# Patient Record
Sex: MALE | Race: WHITE | NOT HISPANIC OR LATINO | Employment: OTHER | URBAN - METROPOLITAN AREA
[De-identification: names, ages, dates, MRNs, and addresses within clinical notes are randomized per-mention and may not be internally consistent; named-entity substitution may affect disease eponyms.]

---

## 2019-06-20 ENCOUNTER — HOSPITAL ENCOUNTER (EMERGENCY)
Dept: GENERAL RADIOLOGY | Facility: HOSPITAL | Age: 69
Discharge: HOME | End: 2019-06-20

## 2019-06-20 ENCOUNTER — HOSPITAL ENCOUNTER (INPATIENT)
Facility: HOSPITAL | Age: 69
LOS: 8 days | Discharge: HOME | End: 2019-06-30
Attending: NURSE PRACTITIONER | Admitting: INTERNAL MEDICINE

## 2019-06-20 ENCOUNTER — HOSPITAL ENCOUNTER (OUTPATIENT)
Dept: CARDIOLOGY | Facility: HOSPITAL | Age: 69
Setting detail: OBSERVATION
Discharge: HOME | End: 2019-06-20

## 2019-06-20 ENCOUNTER — APPOINTMENT (OUTPATIENT)
Dept: CT IMAGING | Facility: HOSPITAL | Age: 69
End: 2019-06-20

## 2019-06-20 DIAGNOSIS — R06.00 DYSPNEA, UNSPECIFIED TYPE: ICD-10-CM

## 2019-06-20 DIAGNOSIS — R79.89 ELEVATED D-DIMER: ICD-10-CM

## 2019-06-20 DIAGNOSIS — Z95.1 S/P CABG (CORONARY ARTERY BYPASS GRAFT): Primary | ICD-10-CM

## 2019-06-20 DIAGNOSIS — R06.02 SHORTNESS OF BREATH: ICD-10-CM

## 2019-06-20 DIAGNOSIS — R94.31 ABNORMAL EKG: ICD-10-CM

## 2019-06-20 DIAGNOSIS — R42 DIZZINESS: ICD-10-CM

## 2019-06-20 DIAGNOSIS — I20.0 UNSTABLE ANGINA (HCC): ICD-10-CM

## 2019-06-20 PROBLEM — G89.29 CHRONIC PAIN: Chronic | Status: ACTIVE | Noted: 2019-06-20

## 2019-06-20 PROBLEM — I10 BENIGN ESSENTIAL HTN: Chronic | Status: ACTIVE | Noted: 2019-06-20

## 2019-06-20 PROBLEM — F51.04 CHRONIC INSOMNIA: Chronic | Status: ACTIVE | Noted: 2019-06-20

## 2019-06-20 PROBLEM — I82.502 CHRONIC DEEP VEIN THROMBOSIS (DVT) OF LEFT LOWER EXTREMITY: Chronic | Status: ACTIVE | Noted: 2019-06-20

## 2019-06-20 PROBLEM — N17.9 ACUTE ON CHRONIC RENAL FAILURE: Status: ACTIVE | Noted: 2019-06-20

## 2019-06-20 PROBLEM — N18.9 ACUTE ON CHRONIC RENAL FAILURE: Status: ACTIVE | Noted: 2019-06-20

## 2019-06-20 PROBLEM — M19.90 OSTEOARTHRITIS: Chronic | Status: ACTIVE | Noted: 2019-06-20

## 2019-06-20 LAB
ALBUMIN SERPL-MCNC: 4.2 G/DL (ref 3.5–4.8)
ALBUMIN/GLOB SERPL: 1.2 G/DL (ref 1–1.7)
ALP SERPL-CCNC: 70 U/L (ref 32–91)
ALT SERPL W P-5'-P-CCNC: 34 U/L (ref 17–63)
ANION GAP SERPL CALCULATED.3IONS-SCNC: 10 MMOL/L (ref 10–20)
AST SERPL-CCNC: 29 U/L (ref 15–41)
BASOPHILS # BLD AUTO: 0 10*3/MM3 (ref 0–0.2)
BASOPHILS NFR BLD AUTO: 0.6 % (ref 0–1.5)
BH CV XLRA MEAS LEFT CCA RATIO VEL: -114 CM/SEC
BH CV XLRA MEAS LEFT DIST CCA EDV: -21.7 CM/SEC
BH CV XLRA MEAS LEFT DIST CCA PSV: -114 CM/SEC
BH CV XLRA MEAS LEFT DIST ICA EDV: -27.9 CM/SEC
BH CV XLRA MEAS LEFT DIST ICA PSV: -74.7 CM/SEC
BH CV XLRA MEAS LEFT ICA RATIO VEL: -84.5 CM/SEC
BH CV XLRA MEAS LEFT ICA/CCA RATIO: 0.74
BH CV XLRA MEAS LEFT PROX CCA EDV: 22.4 CM/SEC
BH CV XLRA MEAS LEFT PROX CCA PSV: 111 CM/SEC
BH CV XLRA MEAS LEFT PROX ECA PSV: -91.9 CM/SEC
BH CV XLRA MEAS LEFT PROX ICA EDV: -34.2 CM/SEC
BH CV XLRA MEAS LEFT PROX ICA PSV: -84.5 CM/SEC
BH CV XLRA MEAS LEFT PROX SCLA PSV: 113 CM/SEC
BH CV XLRA MEAS LEFT VERTEBRAL A PSV: 47.7 CM/SEC
BH CV XLRA MEAS RIGHT CCA RATIO VEL: -113 CM/SEC
BH CV XLRA MEAS RIGHT DIST CCA EDV: -30.4 CM/SEC
BH CV XLRA MEAS RIGHT DIST CCA PSV: -113 CM/SEC
BH CV XLRA MEAS RIGHT DIST ICA EDV: -29.2 CM/SEC
BH CV XLRA MEAS RIGHT DIST ICA PSV: -80.1 CM/SEC
BH CV XLRA MEAS RIGHT ICA RATIO VEL: -80.1 CM/SEC
BH CV XLRA MEAS RIGHT ICA/CCA RATIO: 0.71
BH CV XLRA MEAS RIGHT PROX CCA EDV: 13 CM/SEC
BH CV XLRA MEAS RIGHT PROX CCA PSV: 92.6 CM/SEC
BH CV XLRA MEAS RIGHT PROX ECA PSV: -78.2 CM/SEC
BH CV XLRA MEAS RIGHT PROX ICA EDV: -34.2 CM/SEC
BH CV XLRA MEAS RIGHT PROX ICA PSV: -78.3 CM/SEC
BH CV XLRA MEAS RIGHT PROX SCLA PSV: 113 CM/SEC
BH CV XLRA MEAS RIGHT VERTEBRAL A PSV: -45.4 CM/SEC
BILIRUB SERPL-MCNC: 0.5 MG/DL (ref 0.3–1.2)
BUN BLD-MCNC: 22 MG/DL (ref 8–20)
BUN/CREAT SERPL: 15.7 (ref 6.2–20.3)
CALCIUM SPEC-SCNC: 9.8 MG/DL (ref 8.9–10.3)
CHLORIDE SERPL-SCNC: 106 MMOL/L (ref 101–111)
CO2 SERPL-SCNC: 21 MMOL/L (ref 22–32)
CREAT BLD-MCNC: 1.4 MG/DL (ref 0.7–1.2)
D DIMER PPP FEU-MCNC: 1.9 MCGFEU/ML (ref 0.17–0.59)
DEPRECATED RDW RBC AUTO: 44.2 FL (ref 37–54)
EOSINOPHIL # BLD AUTO: 0.1 10*3/MM3 (ref 0–0.4)
EOSINOPHIL NFR BLD AUTO: 2.3 % (ref 0.3–6.2)
ERYTHROCYTE [DISTWIDTH] IN BLOOD BY AUTOMATED COUNT: 13.6 % (ref 12.3–15.4)
GFR SERPL CREATININE-BSD FRML MDRD: 51 ML/MIN/1.73
GLOBULIN UR ELPH-MCNC: 3.6 GM/DL (ref 2.5–3.8)
GLUCOSE BLD-MCNC: 91 MG/DL (ref 65–99)
HCT VFR BLD AUTO: 43 % (ref 37.5–51)
HGB BLD-MCNC: 15 G/DL (ref 13–17.7)
HOLD SPECIMEN: NORMAL
HOLD SPECIMEN: NORMAL
LYMPHOCYTES # BLD AUTO: 1.2 10*3/MM3 (ref 0.7–3.1)
LYMPHOCYTES NFR BLD AUTO: 20.7 % (ref 19.6–45.3)
MCH RBC QN AUTO: 32.5 PG (ref 26.6–33)
MCHC RBC AUTO-ENTMCNC: 34.9 G/DL (ref 31.5–35.7)
MCV RBC AUTO: 93.1 FL (ref 79–97)
MONOCYTES # BLD AUTO: 0.6 10*3/MM3 (ref 0.1–0.9)
MONOCYTES NFR BLD AUTO: 10 % (ref 5–12)
NEUTROPHILS # BLD AUTO: 3.9 10*3/MM3 (ref 1.7–7)
NEUTROPHILS NFR BLD AUTO: 66.4 % (ref 42.7–76)
NRBC BLD AUTO-RTO: 0 /100 WBC (ref 0–0.2)
PLATELET # BLD AUTO: 157 10*3/MM3 (ref 140–450)
PMV BLD AUTO: 9 FL (ref 6–12)
POTASSIUM BLD-SCNC: 3.9 MMOL/L (ref 3.6–5.1)
PROT SERPL-MCNC: 7.8 G/DL (ref 6.1–7.9)
RBC # BLD AUTO: 4.62 10*6/MM3 (ref 4.14–5.8)
SODIUM BLD-SCNC: 137 MMOL/L (ref 136–144)
TROPONIN I SERPL-MCNC: <0.03 NG/ML (ref 0–0.03)
TROPONIN I SERPL-MCNC: <0.03 NG/ML (ref 0–0.03)
WBC NRBC COR # BLD: 5.9 10*3/MM3 (ref 3.4–10.8)
WHOLE BLOOD HOLD SPECIMEN: NORMAL
WHOLE BLOOD HOLD SPECIMEN: NORMAL

## 2019-06-20 PROCEDURE — 0 IOPAMIDOL PER 1 ML: Performed by: NURSE PRACTITIONER

## 2019-06-20 PROCEDURE — 93880 EXTRACRANIAL BILAT STUDY: CPT

## 2019-06-20 PROCEDURE — G0378 HOSPITAL OBSERVATION PER HR: HCPCS

## 2019-06-20 PROCEDURE — A9500 TC99M SESTAMIBI: HCPCS | Performed by: INTERNAL MEDICINE

## 2019-06-20 PROCEDURE — 84484 ASSAY OF TROPONIN QUANT: CPT | Performed by: NURSE PRACTITIONER

## 2019-06-20 PROCEDURE — 93005 ELECTROCARDIOGRAM TRACING: CPT | Performed by: NURSE PRACTITIONER

## 2019-06-20 PROCEDURE — 85025 COMPLETE CBC W/AUTO DIFF WBC: CPT | Performed by: NURSE PRACTITIONER

## 2019-06-20 PROCEDURE — 80053 COMPREHEN METABOLIC PANEL: CPT | Performed by: NURSE PRACTITIONER

## 2019-06-20 PROCEDURE — 71275 CT ANGIOGRAPHY CHEST: CPT

## 2019-06-20 PROCEDURE — 0 TECHNETIUM SESTAMIBI: Performed by: INTERNAL MEDICINE

## 2019-06-20 PROCEDURE — 71045 X-RAY EXAM CHEST 1 VIEW: CPT

## 2019-06-20 PROCEDURE — 85379 FIBRIN DEGRADATION QUANT: CPT | Performed by: NURSE PRACTITIONER

## 2019-06-20 PROCEDURE — 99284 EMERGENCY DEPT VISIT MOD MDM: CPT

## 2019-06-20 PROCEDURE — 99219 PR INITIAL OBSERVATION CARE/DAY 50 MINUTES: CPT | Performed by: INTERNAL MEDICINE

## 2019-06-20 RX ORDER — ASPIRIN 81 MG/1
324 TABLET, CHEWABLE ORAL ONCE
Status: COMPLETED | OUTPATIENT
Start: 2019-06-20 | End: 2019-06-20

## 2019-06-20 RX ORDER — ACETAMINOPHEN 325 MG/1
650 TABLET ORAL EVERY 4 HOURS PRN
Status: DISCONTINUED | OUTPATIENT
Start: 2019-06-20 | End: 2019-06-25

## 2019-06-20 RX ORDER — CHLORAL HYDRATE 500 MG
CAPSULE ORAL
COMMUNITY

## 2019-06-20 RX ORDER — SODIUM CHLORIDE 0.9 % (FLUSH) 0.9 %
3 SYRINGE (ML) INJECTION EVERY 12 HOURS SCHEDULED
Status: DISCONTINUED | OUTPATIENT
Start: 2019-06-20 | End: 2019-06-24 | Stop reason: HOSPADM

## 2019-06-20 RX ORDER — CYCLOBENZAPRINE HCL 10 MG
10 TABLET ORAL 2 TIMES DAILY
Status: DISCONTINUED | OUTPATIENT
Start: 2019-06-20 | End: 2019-06-24 | Stop reason: HOSPADM

## 2019-06-20 RX ORDER — CHOLECALCIFEROL (VITAMIN D3) 125 MCG
5 CAPSULE ORAL NIGHTLY PRN
Status: DISCONTINUED | OUTPATIENT
Start: 2019-06-20 | End: 2019-06-24 | Stop reason: HOSPADM

## 2019-06-20 RX ORDER — TEMAZEPAM 15 MG/1
15 CAPSULE ORAL NIGHTLY PRN
Status: DISCONTINUED | OUTPATIENT
Start: 2019-06-20 | End: 2019-06-24 | Stop reason: HOSPADM

## 2019-06-20 RX ORDER — MELATONIN
1000 DAILY
Status: DISCONTINUED | OUTPATIENT
Start: 2019-06-20 | End: 2019-06-24 | Stop reason: HOSPADM

## 2019-06-20 RX ORDER — ASPIRIN 81 MG/1
81 TABLET, CHEWABLE ORAL DAILY
COMMUNITY
End: 2022-03-16 | Stop reason: SDUPTHER

## 2019-06-20 RX ORDER — ONDANSETRON 2 MG/ML
4 INJECTION INTRAMUSCULAR; INTRAVENOUS EVERY 6 HOURS PRN
Status: DISCONTINUED | OUTPATIENT
Start: 2019-06-20 | End: 2019-06-24 | Stop reason: HOSPADM

## 2019-06-20 RX ORDER — MELATONIN
1000 DAILY
COMMUNITY
End: 2022-03-16 | Stop reason: SDUPTHER

## 2019-06-20 RX ORDER — SODIUM CHLORIDE 0.9 % (FLUSH) 0.9 %
10 SYRINGE (ML) INJECTION AS NEEDED
Status: DISCONTINUED | OUTPATIENT
Start: 2019-06-20 | End: 2019-06-24 | Stop reason: HOSPADM

## 2019-06-20 RX ORDER — SODIUM CHLORIDE 0.9 % (FLUSH) 0.9 %
3-10 SYRINGE (ML) INJECTION AS NEEDED
Status: DISCONTINUED | OUTPATIENT
Start: 2019-06-20 | End: 2019-06-24 | Stop reason: HOSPADM

## 2019-06-20 RX ORDER — ONDANSETRON 4 MG/1
4 TABLET, FILM COATED ORAL EVERY 6 HOURS PRN
Status: DISCONTINUED | OUTPATIENT
Start: 2019-06-20 | End: 2019-06-24 | Stop reason: HOSPADM

## 2019-06-20 RX ORDER — SODIUM CHLORIDE 9 MG/ML
100 INJECTION, SOLUTION INTRAVENOUS CONTINUOUS
Status: DISCONTINUED | OUTPATIENT
Start: 2019-06-20 | End: 2019-06-23

## 2019-06-20 RX ORDER — NITROGLYCERIN 0.4 MG/1
0.4 TABLET SUBLINGUAL
Status: DISCONTINUED | OUTPATIENT
Start: 2019-06-20 | End: 2019-06-24 | Stop reason: HOSPADM

## 2019-06-20 RX ORDER — ATENOLOL 50 MG/1
50 TABLET ORAL DAILY
COMMUNITY
End: 2019-06-30 | Stop reason: HOSPADM

## 2019-06-20 RX ORDER — LISINOPRIL 5 MG/1
5 TABLET ORAL DAILY
COMMUNITY
End: 2019-06-30 | Stop reason: HOSPADM

## 2019-06-20 RX ORDER — ASPIRIN 81 MG/1
81 TABLET, CHEWABLE ORAL DAILY
Status: DISCONTINUED | OUTPATIENT
Start: 2019-06-21 | End: 2019-06-21

## 2019-06-20 RX ORDER — DICLOFENAC SODIUM 75 MG/1
75 TABLET, DELAYED RELEASE ORAL 2 TIMES DAILY
COMMUNITY
End: 2019-06-30 | Stop reason: HOSPADM

## 2019-06-20 RX ORDER — ALUMINA, MAGNESIA, AND SIMETHICONE 2400; 2400; 240 MG/30ML; MG/30ML; MG/30ML
15 SUSPENSION ORAL EVERY 6 HOURS PRN
Status: DISCONTINUED | OUTPATIENT
Start: 2019-06-20 | End: 2019-06-24

## 2019-06-20 RX ORDER — CYCLOBENZAPRINE HCL 10 MG
10 TABLET ORAL 2 TIMES DAILY
COMMUNITY
Start: 2019-06-14 | End: 2019-06-30 | Stop reason: HOSPADM

## 2019-06-20 RX ORDER — ASPIRIN 81 MG/1
81 TABLET, CHEWABLE ORAL DAILY
Status: DISCONTINUED | OUTPATIENT
Start: 2019-06-20 | End: 2019-06-20

## 2019-06-20 RX ADMIN — IOPAMIDOL 100 ML: 755 INJECTION, SOLUTION INTRAVENOUS at 12:09

## 2019-06-20 RX ADMIN — SODIUM CHLORIDE 100 ML/HR: 900 INJECTION, SOLUTION INTRAVENOUS at 17:05

## 2019-06-20 RX ADMIN — ASPIRIN 81 MG 324 MG: 81 TABLET ORAL at 09:23

## 2019-06-20 RX ADMIN — Medication 10 ML: at 09:28

## 2019-06-21 ENCOUNTER — APPOINTMENT (OUTPATIENT)
Dept: NUCLEAR MEDICINE | Facility: HOSPITAL | Age: 69
End: 2019-06-21

## 2019-06-21 PROBLEM — R42 DIZZINESS: Status: ACTIVE | Noted: 2019-06-21

## 2019-06-21 PROBLEM — R42 DIZZINESS: Status: RESOLVED | Noted: 2019-06-20 | Resolved: 2019-06-21

## 2019-06-21 PROBLEM — N17.9 ACUTE ON CHRONIC RENAL FAILURE: Status: RESOLVED | Noted: 2019-06-20 | Resolved: 2019-06-21

## 2019-06-21 PROBLEM — R06.02 SHORTNESS OF BREATH: Status: ACTIVE | Noted: 2019-06-21

## 2019-06-21 PROBLEM — R06.00 DYSPNEA: Status: RESOLVED | Noted: 2019-06-20 | Resolved: 2019-06-21

## 2019-06-21 PROBLEM — R79.89 ELEVATED D-DIMER: Status: RESOLVED | Noted: 2019-06-20 | Resolved: 2019-06-21

## 2019-06-21 PROBLEM — N18.9 ACUTE ON CHRONIC RENAL FAILURE (HCC): Status: RESOLVED | Noted: 2019-06-20 | Resolved: 2019-06-21

## 2019-06-21 LAB
ALBUMIN SERPL-MCNC: 3.6 G/DL (ref 3.5–4.8)
ALBUMIN/GLOB SERPL: 1.1 G/DL (ref 1–1.7)
ALP SERPL-CCNC: 58 U/L (ref 32–91)
ALT SERPL W P-5'-P-CCNC: 30 U/L (ref 17–63)
ANION GAP SERPL CALCULATED.3IONS-SCNC: 6 MMOL/L (ref 10–20)
AST SERPL-CCNC: 22 U/L (ref 15–41)
BASOPHILS # BLD AUTO: 0 10*3/MM3 (ref 0–0.2)
BASOPHILS NFR BLD AUTO: 0.3 % (ref 0–1.5)
BILIRUB SERPL-MCNC: 0.8 MG/DL (ref 0.3–1.2)
BUN BLD-MCNC: 23 MG/DL (ref 8–20)
BUN/CREAT SERPL: 16.4 (ref 6.2–20.3)
CALCIUM SPEC-SCNC: 8.8 MG/DL (ref 8.9–10.3)
CHLORIDE SERPL-SCNC: 109 MMOL/L (ref 101–111)
CO2 SERPL-SCNC: 22 MMOL/L (ref 22–32)
CREAT BLD-MCNC: 1.4 MG/DL (ref 0.7–1.2)
DEPRECATED RDW RBC AUTO: 43.3 FL (ref 37–54)
EOSINOPHIL # BLD AUTO: 0.2 10*3/MM3 (ref 0–0.4)
EOSINOPHIL NFR BLD AUTO: 4.1 % (ref 0.3–6.2)
ERYTHROCYTE [DISTWIDTH] IN BLOOD BY AUTOMATED COUNT: 13.3 % (ref 12.3–15.4)
GFR SERPL CREATININE-BSD FRML MDRD: 51 ML/MIN/1.73
GLOBULIN UR ELPH-MCNC: 3.2 GM/DL (ref 2.5–3.8)
GLUCOSE BLD-MCNC: 99 MG/DL (ref 65–99)
HCT VFR BLD AUTO: 39.9 % (ref 37.5–51)
HGB BLD-MCNC: 13.5 G/DL (ref 13–17.7)
LYMPHOCYTES # BLD AUTO: 1.5 10*3/MM3 (ref 0.7–3.1)
LYMPHOCYTES NFR BLD AUTO: 25 % (ref 19.6–45.3)
MCH RBC QN AUTO: 31.9 PG (ref 26.6–33)
MCHC RBC AUTO-ENTMCNC: 33.9 G/DL (ref 31.5–35.7)
MCV RBC AUTO: 94.3 FL (ref 79–97)
MONOCYTES # BLD AUTO: 0.7 10*3/MM3 (ref 0.1–0.9)
MONOCYTES NFR BLD AUTO: 11.4 % (ref 5–12)
NEUTROPHILS # BLD AUTO: 3.5 10*3/MM3 (ref 1.7–7)
NEUTROPHILS NFR BLD AUTO: 59.2 % (ref 42.7–76)
NRBC BLD AUTO-RTO: 0.1 /100 WBC (ref 0–0.2)
PLATELET # BLD AUTO: 139 10*3/MM3 (ref 140–450)
PMV BLD AUTO: 9.5 FL (ref 6–12)
POTASSIUM BLD-SCNC: 3.8 MMOL/L (ref 3.6–5.1)
PROT SERPL-MCNC: 6.8 G/DL (ref 6.1–7.9)
RBC # BLD AUTO: 4.23 10*6/MM3 (ref 4.14–5.8)
SODIUM BLD-SCNC: 137 MMOL/L (ref 136–144)
TROPONIN I SERPL-MCNC: <0.03 NG/ML (ref 0–0.03)
WBC NRBC COR # BLD: 5.9 10*3/MM3 (ref 3.4–10.8)

## 2019-06-21 PROCEDURE — 80053 COMPREHEN METABOLIC PANEL: CPT | Performed by: INTERNAL MEDICINE

## 2019-06-21 PROCEDURE — 93017 CV STRESS TEST TRACING ONLY: CPT

## 2019-06-21 PROCEDURE — G0378 HOSPITAL OBSERVATION PER HR: HCPCS

## 2019-06-21 PROCEDURE — 0 TECHNETIUM SESTAMIBI: Performed by: INTERNAL MEDICINE

## 2019-06-21 PROCEDURE — 99225 PR SBSQ OBSERVATION CARE/DAY 25 MINUTES: CPT | Performed by: INTERNAL MEDICINE

## 2019-06-21 PROCEDURE — 99244 OFF/OP CNSLTJ NEW/EST MOD 40: CPT | Performed by: INTERNAL MEDICINE

## 2019-06-21 PROCEDURE — 85025 COMPLETE CBC W/AUTO DIFF WBC: CPT | Performed by: INTERNAL MEDICINE

## 2019-06-21 PROCEDURE — 84484 ASSAY OF TROPONIN QUANT: CPT | Performed by: NURSE PRACTITIONER

## 2019-06-21 PROCEDURE — 25010000002 ENOXAPARIN PER 10 MG: Performed by: NURSE PRACTITIONER

## 2019-06-21 PROCEDURE — 25010000002 REGADENOSON 0.4 MG/5ML SOLUTION: Performed by: INTERNAL MEDICINE

## 2019-06-21 PROCEDURE — A9500 TC99M SESTAMIBI: HCPCS | Performed by: INTERNAL MEDICINE

## 2019-06-21 PROCEDURE — 78452 HT MUSCLE IMAGE SPECT MULT: CPT

## 2019-06-21 RX ORDER — ATORVASTATIN CALCIUM 20 MG/1
20 TABLET, FILM COATED ORAL DAILY
Qty: 30 TABLET | Refills: 1 | Status: SHIPPED | OUTPATIENT
Start: 2019-06-21 | End: 2019-06-30 | Stop reason: HOSPADM

## 2019-06-21 RX ORDER — SODIUM CHLORIDE 0.9 % (FLUSH) 0.9 %
3-10 SYRINGE (ML) INJECTION AS NEEDED
Status: DISCONTINUED | OUTPATIENT
Start: 2019-06-21 | End: 2019-06-22 | Stop reason: HOSPADM

## 2019-06-21 RX ORDER — SODIUM CHLORIDE 0.9 % (FLUSH) 0.9 %
3 SYRINGE (ML) INJECTION EVERY 12 HOURS SCHEDULED
Status: DISCONTINUED | OUTPATIENT
Start: 2019-06-21 | End: 2019-06-22 | Stop reason: HOSPADM

## 2019-06-21 RX ORDER — NITROGLYCERIN 0.4 MG/1
0.4 TABLET SUBLINGUAL
Status: DISCONTINUED | OUTPATIENT
Start: 2019-06-21 | End: 2019-06-21

## 2019-06-21 RX ORDER — SODIUM CHLORIDE 9 MG/ML
75 INJECTION, SOLUTION INTRAVENOUS CONTINUOUS
Status: DISCONTINUED | OUTPATIENT
Start: 2019-06-21 | End: 2019-06-23

## 2019-06-21 RX ORDER — ACETYLCYSTEINE 200 MG/ML
1200 SOLUTION ORAL; RESPIRATORY (INHALATION) EVERY 12 HOURS SCHEDULED
Status: CANCELLED | OUTPATIENT
Start: 2019-06-21 | End: 2019-06-22

## 2019-06-21 RX ORDER — ASPIRIN 81 MG/1
324 TABLET, CHEWABLE ORAL ONCE
Status: DISCONTINUED | OUTPATIENT
Start: 2019-06-21 | End: 2019-06-21

## 2019-06-21 RX ORDER — ATORVASTATIN CALCIUM 20 MG/1
20 TABLET, FILM COATED ORAL NIGHTLY
Status: DISCONTINUED | OUTPATIENT
Start: 2019-06-21 | End: 2019-06-22

## 2019-06-21 RX ORDER — ATENOLOL 50 MG/1
50 TABLET ORAL DAILY
Status: DISCONTINUED | OUTPATIENT
Start: 2019-06-21 | End: 2019-06-24 | Stop reason: HOSPADM

## 2019-06-21 RX ORDER — ASPIRIN 81 MG/1
81 TABLET ORAL DAILY
Status: DISCONTINUED | OUTPATIENT
Start: 2019-06-22 | End: 2019-06-24 | Stop reason: HOSPADM

## 2019-06-21 RX ADMIN — TECHNETIUM TC 99M SESTAMIBI 1 DOSE: 1 INJECTION INTRAVENOUS at 09:15

## 2019-06-21 RX ADMIN — SODIUM CHLORIDE 75 ML/HR: 900 INJECTION, SOLUTION INTRAVENOUS at 23:28

## 2019-06-21 RX ADMIN — ATENOLOL 50 MG: 50 TABLET ORAL at 17:11

## 2019-06-21 RX ADMIN — ATORVASTATIN CALCIUM 20 MG: 20 TABLET, FILM COATED ORAL at 20:03

## 2019-06-21 RX ADMIN — ENOXAPARIN SODIUM 40 MG: 40 INJECTION SUBCUTANEOUS at 16:35

## 2019-06-21 RX ADMIN — ACETAMINOPHEN 650 MG: 325 TABLET, FILM COATED ORAL at 16:35

## 2019-06-21 RX ADMIN — REGADENOSON 0.4 MG: 0.08 INJECTION, SOLUTION INTRAVENOUS at 09:15

## 2019-06-21 RX ADMIN — Medication 3 ML: at 08:05

## 2019-06-21 RX ADMIN — SODIUM CHLORIDE 75 ML/HR: 900 INJECTION, SOLUTION INTRAVENOUS at 17:15

## 2019-06-21 RX ADMIN — SODIUM CHLORIDE 100 ML/HR: 900 INJECTION, SOLUTION INTRAVENOUS at 03:11

## 2019-06-21 RX ADMIN — Medication 1 DOSE: at 06:55

## 2019-06-21 RX ADMIN — TEMAZEPAM 15 MG: 15 CAPSULE ORAL at 20:03

## 2019-06-21 RX ADMIN — ASPIRIN 81 MG 81 MG: 81 TABLET ORAL at 08:03

## 2019-06-22 ENCOUNTER — HOSPITAL ENCOUNTER (INPATIENT)
Dept: CARDIOLOGY | Facility: HOSPITAL | Age: 69
Discharge: HOME | End: 2019-06-22

## 2019-06-22 VITALS
WEIGHT: 187 LBS | BODY MASS INDEX: 28.34 KG/M2 | HEIGHT: 68 IN | DIASTOLIC BLOOD PRESSURE: 85 MMHG | SYSTOLIC BLOOD PRESSURE: 149 MMHG

## 2019-06-22 PROBLEM — I20.0 UNSTABLE ANGINA: Status: ACTIVE | Noted: 2019-06-22

## 2019-06-22 PROBLEM — R94.31 ABNORMAL EKG: Status: ACTIVE | Noted: 2019-06-22

## 2019-06-22 PROBLEM — R94.39 ABNORMAL NUCLEAR STRESS TEST: Status: ACTIVE | Noted: 2019-06-22

## 2019-06-22 LAB
ANION GAP SERPL CALCULATED.3IONS-SCNC: 6 MMOL/L (ref 10–20)
ARTICHOKE IGE QN: 110 MG/DL (ref 0–100)
BASOPHILS # BLD AUTO: 0 10*3/MM3 (ref 0–0.2)
BASOPHILS NFR BLD AUTO: 0.4 % (ref 0–1.5)
BH CV ECHO MEAS - % IVS THICK: 52 %
BH CV ECHO MEAS - % LVPW THICK: 34.4 %
BH CV ECHO MEAS - ACS: 1.6 CM
BH CV ECHO MEAS - AO MAX PG (FULL): 0.36 MMHG
BH CV ECHO MEAS - AO MAX PG: 4.7 MMHG
BH CV ECHO MEAS - AO MEAN PG (FULL): -0.03 MMHG
BH CV ECHO MEAS - AO MEAN PG: 2.6 MMHG
BH CV ECHO MEAS - AO ROOT AREA (BSA CORRECTED): 1.8
BH CV ECHO MEAS - AO ROOT AREA: 9 CM^2
BH CV ECHO MEAS - AO ROOT DIAM: 3.4 CM
BH CV ECHO MEAS - AO V2 MAX: 108.9 CM/SEC
BH CV ECHO MEAS - AO V2 MEAN: 77.4 CM/SEC
BH CV ECHO MEAS - AO V2 VTI: 20.1 CM
BH CV ECHO MEAS - AVA(I,A): 3.5 CM^2
BH CV ECHO MEAS - AVA(I,D): 3.5 CM^2
BH CV ECHO MEAS - AVA(V,A): 3.5 CM^2
BH CV ECHO MEAS - AVA(V,D): 3.5 CM^2
BH CV ECHO MEAS - BSA(HAYCOCK): 1.9 M^2
BH CV ECHO MEAS - BSA: 1.9 M^2
BH CV ECHO MEAS - BZI_BMI: 25.1 KILOGRAMS/M^2
BH CV ECHO MEAS - BZI_METRIC_HEIGHT: 175.3 CM
BH CV ECHO MEAS - BZI_METRIC_WEIGHT: 77.1 KG
BH CV ECHO MEAS - EDV(CUBED): 167.9 ML
BH CV ECHO MEAS - EDV(MOD-SP2): 75.6 ML
BH CV ECHO MEAS - EDV(MOD-SP4): 79.2 ML
BH CV ECHO MEAS - EDV(TEICH): 148.5 ML
BH CV ECHO MEAS - EF(CUBED): 58.8 %
BH CV ECHO MEAS - EF(MOD-BP): 50 %
BH CV ECHO MEAS - EF(MOD-SP2): 53.9 %
BH CV ECHO MEAS - EF(MOD-SP4): 45.5 %
BH CV ECHO MEAS - EF(TEICH): 49.9 %
BH CV ECHO MEAS - ESV(CUBED): 69.2 ML
BH CV ECHO MEAS - ESV(MOD-SP2): 34.9 ML
BH CV ECHO MEAS - ESV(MOD-SP4): 43.2 ML
BH CV ECHO MEAS - ESV(TEICH): 74.4 ML
BH CV ECHO MEAS - FS: 25.6 %
BH CV ECHO MEAS - IVS/LVPW: 1.3
BH CV ECHO MEAS - IVSD: 1.3 CM
BH CV ECHO MEAS - IVSS: 1.9 CM
BH CV ECHO MEAS - LA DIMENSION(2D): 4 CM
BH CV ECHO MEAS - LV DIASTOLIC VOL/BSA (35-75): 41.1 ML/M^2
BH CV ECHO MEAS - LV MASS(C)D: 252.9 GRAMS
BH CV ECHO MEAS - LV MASS(C)DI: 131.2 GRAMS/M^2
BH CV ECHO MEAS - LV MASS(C)S: 276 GRAMS
BH CV ECHO MEAS - LV MASS(C)SI: 143.2 GRAMS/M^2
BH CV ECHO MEAS - LV MAX PG: 4.4 MMHG
BH CV ECHO MEAS - LV MEAN PG: 2.7 MMHG
BH CV ECHO MEAS - LV SYSTOLIC VOL/BSA (12-30): 22.4 ML/M^2
BH CV ECHO MEAS - LV V1 MAX: 104.7 CM/SEC
BH CV ECHO MEAS - LV V1 MEAN: 77.5 CM/SEC
BH CV ECHO MEAS - LV V1 VTI: 19 CM
BH CV ECHO MEAS - LVIDD: 5.5 CM
BH CV ECHO MEAS - LVIDS: 4.1 CM
BH CV ECHO MEAS - LVOT AREA: 3.6 CM^2
BH CV ECHO MEAS - LVOT DIAM: 2.2 CM
BH CV ECHO MEAS - LVPWD: 1 CM
BH CV ECHO MEAS - LVPWS: 1.3 CM
BH CV ECHO MEAS - MV A MAX VEL: 77.9 CM/SEC
BH CV ECHO MEAS - MV DEC SLOPE: 230.3 CM/SEC^2
BH CV ECHO MEAS - MV DEC TIME: 0.25 SEC
BH CV ECHO MEAS - MV E MAX VEL: 29.3 CM/SEC
BH CV ECHO MEAS - MV E/A: 0.38
BH CV ECHO MEAS - MV MAX PG: 3.3 MMHG
BH CV ECHO MEAS - MV MEAN PG: 1.1 MMHG
BH CV ECHO MEAS - MV V2 MAX: 91.2 CM/SEC
BH CV ECHO MEAS - MV V2 MEAN: 47.9 CM/SEC
BH CV ECHO MEAS - MV V2 VTI: 19.4 CM
BH CV ECHO MEAS - MVA(VTI): 3.6 CM^2
BH CV ECHO MEAS - PA ACC TIME: 0.12 SEC
BH CV ECHO MEAS - PA MAX PG (FULL): 1 MMHG
BH CV ECHO MEAS - PA MAX PG: 2.6 MMHG
BH CV ECHO MEAS - PA MEAN PG (FULL): 0.85 MMHG
BH CV ECHO MEAS - PA MEAN PG: 1.5 MMHG
BH CV ECHO MEAS - PA PR(ACCEL): 24.5 MMHG
BH CV ECHO MEAS - PA V2 MAX: 80.1 CM/SEC
BH CV ECHO MEAS - PA V2 MEAN: 58.9 CM/SEC
BH CV ECHO MEAS - PA V2 VTI: 14.1 CM
BH CV ECHO MEAS - PULM A REVS DUR: 0.12 SEC
BH CV ECHO MEAS - PULM A REVS VEL: 27.6 CM/SEC
BH CV ECHO MEAS - PULM DIAS VEL: 35.4 CM/SEC
BH CV ECHO MEAS - PULM S/D: 1.5
BH CV ECHO MEAS - PULM SYS VEL: 51.7 CM/SEC
BH CV ECHO MEAS - RAP SYSTOLE: 3 MMHG
BH CV ECHO MEAS - RV MAX PG: 1.5 MMHG
BH CV ECHO MEAS - RV MEAN PG: 0.68 MMHG
BH CV ECHO MEAS - RV V1 MAX: 61.9 CM/SEC
BH CV ECHO MEAS - RV V1 MEAN: 38.5 CM/SEC
BH CV ECHO MEAS - RV V1 VTI: 10.8 CM
BH CV ECHO MEAS - RVDD: 3.2 CM
BH CV ECHO MEAS - RVSP: 26.2 MMHG
BH CV ECHO MEAS - SI(AO): 94.1 ML/M^2
BH CV ECHO MEAS - SI(CUBED): 51.2 ML/M^2
BH CV ECHO MEAS - SI(LVOT): 36 ML/M^2
BH CV ECHO MEAS - SI(MOD-SP2): 21.1 ML/M^2
BH CV ECHO MEAS - SI(MOD-SP4): 18.7 ML/M^2
BH CV ECHO MEAS - SI(TEICH): 38.4 ML/M^2
BH CV ECHO MEAS - SV(AO): 181.3 ML
BH CV ECHO MEAS - SV(CUBED): 98.7 ML
BH CV ECHO MEAS - SV(LVOT): 69.5 ML
BH CV ECHO MEAS - SV(MOD-SP2): 40.8 ML
BH CV ECHO MEAS - SV(MOD-SP4): 36 ML
BH CV ECHO MEAS - SV(TEICH): 74 ML
BH CV ECHO MEAS - TR MAX VEL: 239.9 CM/SEC
BH CV NUCLEAR PRIOR STUDY: 3
BH CV STRESS COMMENTS STAGE 1: NORMAL
BH CV STRESS DOSE REGADENOSON STAGE 1: 0.4
BH CV STRESS DURATION MIN STAGE 1: 0
BH CV STRESS DURATION SEC STAGE 1: 10
BH CV STRESS PROTOCOL 1: NORMAL
BH CV STRESS RECOVERY BP: NORMAL MMHG
BH CV STRESS RECOVERY HR: 93 BPM
BH CV STRESS STAGE 1: 1
BUN BLD-MCNC: 21 MG/DL (ref 8–20)
BUN/CREAT SERPL: 16.2 (ref 6.2–20.3)
CALCIUM SPEC-SCNC: 8.6 MG/DL (ref 8.9–10.3)
CHLORIDE SERPL-SCNC: 106 MMOL/L (ref 101–111)
CHOLEST SERPL-MCNC: 172 MG/DL
CO2 SERPL-SCNC: 23 MMOL/L (ref 22–32)
CREAT BLD-MCNC: 1.3 MG/DL (ref 0.7–1.2)
DEPRECATED RDW RBC AUTO: 43.8 FL (ref 37–54)
EOSINOPHIL # BLD AUTO: 0.2 10*3/MM3 (ref 0–0.4)
EOSINOPHIL NFR BLD AUTO: 3.5 % (ref 0.3–6.2)
ERYTHROCYTE [DISTWIDTH] IN BLOOD BY AUTOMATED COUNT: 13.5 % (ref 12.3–15.4)
GFR SERPL CREATININE-BSD FRML MDRD: 55 ML/MIN/1.73
GLUCOSE BLD-MCNC: 104 MG/DL (ref 65–99)
HCT VFR BLD AUTO: 38.2 % (ref 37.5–51)
HDLC SERPL QL: 7.17
HDLC SERPL-MCNC: 24 MG/DL
HGB BLD-MCNC: 13 G/DL (ref 13–17.7)
INR PPP: 1.04 (ref 0.9–1.1)
LDLC/HDLC SERPL: 3.73 {RATIO}
LV EF NUC BP: 61 %
LYMPHOCYTES # BLD AUTO: 1.5 10*3/MM3 (ref 0.7–3.1)
LYMPHOCYTES NFR BLD AUTO: 29.9 % (ref 19.6–45.3)
MAXIMAL PREDICTED HEART RATE: 153 BPM
MCH RBC QN AUTO: 31.6 PG (ref 26.6–33)
MCHC RBC AUTO-ENTMCNC: 34 G/DL (ref 31.5–35.7)
MCV RBC AUTO: 93 FL (ref 79–97)
MONOCYTES # BLD AUTO: 0.6 10*3/MM3 (ref 0.1–0.9)
MONOCYTES NFR BLD AUTO: 10.9 % (ref 5–12)
NEUTROPHILS # BLD AUTO: 2.8 10*3/MM3 (ref 1.7–7)
NEUTROPHILS NFR BLD AUTO: 55.3 % (ref 42.7–76)
NRBC BLD AUTO-RTO: 0.1 /100 WBC (ref 0–0.2)
PERCENT MAX PREDICTED HR: 64.71 %
PLATELET # BLD AUTO: 126 10*3/MM3 (ref 140–450)
PMV BLD AUTO: 9.5 FL (ref 6–12)
POTASSIUM BLD-SCNC: 4 MMOL/L (ref 3.6–5.1)
PROTHROMBIN TIME: 10.7 SECONDS (ref 9.6–11.7)
RBC # BLD AUTO: 4.11 10*6/MM3 (ref 4.14–5.8)
SODIUM BLD-SCNC: 135 MMOL/L (ref 136–144)
STRESS BASELINE BP: NORMAL MMHG
STRESS BASELINE HR: 70 BPM
STRESS PERCENT HR: 76 %
STRESS POST PEAK BP: NORMAL MMHG
STRESS POST PEAK HR: 99 BPM
STRESS TARGET HR: 130 BPM
TRIGL SERPL-MCNC: 292 MG/DL
VLDLC SERPL-MCNC: 58.4 MG/DL
WBC NRBC COR # BLD: 5.1 10*3/MM3 (ref 3.4–10.8)

## 2019-06-22 PROCEDURE — 93018 CV STRESS TEST I&R ONLY: CPT | Performed by: INTERNAL MEDICINE

## 2019-06-22 PROCEDURE — 99232 SBSQ HOSP IP/OBS MODERATE 35: CPT | Performed by: INTERNAL MEDICINE

## 2019-06-22 PROCEDURE — 80061 LIPID PANEL: CPT | Performed by: NURSE PRACTITIONER

## 2019-06-22 PROCEDURE — 25010000002 ENOXAPARIN PER 10 MG: Performed by: NURSE PRACTITIONER

## 2019-06-22 PROCEDURE — B2111ZZ FLUOROSCOPY OF MULTIPLE CORONARY ARTERIES USING LOW OSMOLAR CONTRAST: ICD-10-PCS | Performed by: INTERNAL MEDICINE

## 2019-06-22 PROCEDURE — 78452 HT MUSCLE IMAGE SPECT MULT: CPT | Performed by: INTERNAL MEDICINE

## 2019-06-22 PROCEDURE — B2151ZZ FLUOROSCOPY OF LEFT HEART USING LOW OSMOLAR CONTRAST: ICD-10-PCS | Performed by: INTERNAL MEDICINE

## 2019-06-22 PROCEDURE — C1894 INTRO/SHEATH, NON-LASER: HCPCS | Performed by: INTERNAL MEDICINE

## 2019-06-22 PROCEDURE — 85025 COMPLETE CBC W/AUTO DIFF WBC: CPT | Performed by: NURSE PRACTITIONER

## 2019-06-22 PROCEDURE — 93458 L HRT ARTERY/VENTRICLE ANGIO: CPT | Performed by: INTERNAL MEDICINE

## 2019-06-22 PROCEDURE — 85610 PROTHROMBIN TIME: CPT | Performed by: NURSE PRACTITIONER

## 2019-06-22 PROCEDURE — C1769 GUIDE WIRE: HCPCS | Performed by: INTERNAL MEDICINE

## 2019-06-22 PROCEDURE — 93016 CV STRESS TEST SUPVJ ONLY: CPT | Performed by: NURSE PRACTITIONER

## 2019-06-22 PROCEDURE — 93306 TTE W/DOPPLER COMPLETE: CPT

## 2019-06-22 PROCEDURE — 80048 BASIC METABOLIC PNL TOTAL CA: CPT | Performed by: NURSE PRACTITIONER

## 2019-06-22 PROCEDURE — 93306 TTE W/DOPPLER COMPLETE: CPT | Performed by: INTERNAL MEDICINE

## 2019-06-22 PROCEDURE — 25010000002 MIDAZOLAM PER 1 MG: Performed by: INTERNAL MEDICINE

## 2019-06-22 PROCEDURE — 4A023N7 MEASUREMENT OF CARDIAC SAMPLING AND PRESSURE, LEFT HEART, PERCUTANEOUS APPROACH: ICD-10-PCS | Performed by: INTERNAL MEDICINE

## 2019-06-22 PROCEDURE — 25010000002 FENTANYL CITRATE (PF) 100 MCG/2ML SOLUTION: Performed by: INTERNAL MEDICINE

## 2019-06-22 PROCEDURE — 0 IOPAMIDOL PER 1 ML: Performed by: INTERNAL MEDICINE

## 2019-06-22 RX ORDER — ONDANSETRON 4 MG/1
4 TABLET, FILM COATED ORAL EVERY 6 HOURS PRN
Status: DISCONTINUED | OUTPATIENT
Start: 2019-06-22 | End: 2019-06-24 | Stop reason: HOSPADM

## 2019-06-22 RX ORDER — ONDANSETRON 2 MG/ML
4 INJECTION INTRAMUSCULAR; INTRAVENOUS EVERY 6 HOURS PRN
Status: DISCONTINUED | OUTPATIENT
Start: 2019-06-22 | End: 2019-06-24 | Stop reason: HOSPADM

## 2019-06-22 RX ORDER — ATORVASTATIN CALCIUM 40 MG/1
40 TABLET, FILM COATED ORAL NIGHTLY
Status: DISCONTINUED | OUTPATIENT
Start: 2019-06-22 | End: 2019-06-24 | Stop reason: HOSPADM

## 2019-06-22 RX ORDER — MIDAZOLAM HYDROCHLORIDE 1 MG/ML
INJECTION INTRAMUSCULAR; INTRAVENOUS AS NEEDED
Status: DISCONTINUED | OUTPATIENT
Start: 2019-06-22 | End: 2019-06-22 | Stop reason: HOSPADM

## 2019-06-22 RX ORDER — SODIUM CHLORIDE 9 MG/ML
250 INJECTION, SOLUTION INTRAVENOUS ONCE AS NEEDED
Status: DISCONTINUED | OUTPATIENT
Start: 2019-06-22 | End: 2019-06-24 | Stop reason: HOSPADM

## 2019-06-22 RX ORDER — ACETAMINOPHEN 325 MG/1
650 TABLET ORAL EVERY 4 HOURS PRN
Status: DISCONTINUED | OUTPATIENT
Start: 2019-06-22 | End: 2019-06-30 | Stop reason: HOSPADM

## 2019-06-22 RX ORDER — ATROPINE SULFATE 1 MG/ML
.5-1 INJECTION, SOLUTION INTRAMUSCULAR; INTRAVENOUS; SUBCUTANEOUS
Status: DISCONTINUED | OUTPATIENT
Start: 2019-06-22 | End: 2019-06-24 | Stop reason: HOSPADM

## 2019-06-22 RX ORDER — FENTANYL CITRATE 50 UG/ML
INJECTION, SOLUTION INTRAMUSCULAR; INTRAVENOUS AS NEEDED
Status: DISCONTINUED | OUTPATIENT
Start: 2019-06-22 | End: 2019-06-22 | Stop reason: HOSPADM

## 2019-06-22 RX ADMIN — SODIUM CHLORIDE 75 ML/HR: 900 INJECTION, SOLUTION INTRAVENOUS at 17:10

## 2019-06-22 RX ADMIN — ASPIRIN 81 MG: 81 TABLET, COATED ORAL at 05:49

## 2019-06-22 RX ADMIN — ACETAMINOPHEN 650 MG: 325 TABLET, FILM COATED ORAL at 13:22

## 2019-06-22 RX ADMIN — ATORVASTATIN CALCIUM 40 MG: 40 TABLET, FILM COATED ORAL at 21:37

## 2019-06-22 RX ADMIN — ENOXAPARIN SODIUM 40 MG: 40 INJECTION SUBCUTANEOUS at 17:30

## 2019-06-23 ENCOUNTER — HOSPITAL ENCOUNTER (INPATIENT)
Dept: CARDIOLOGY | Facility: HOSPITAL | Age: 69
End: 2019-06-23

## 2019-06-23 ENCOUNTER — APPOINTMENT (OUTPATIENT)
Dept: RESPIRATORY THERAPY | Facility: HOSPITAL | Age: 69
End: 2019-06-23

## 2019-06-23 ENCOUNTER — APPOINTMENT (OUTPATIENT)
Dept: GENERAL RADIOLOGY | Facility: HOSPITAL | Age: 69
End: 2019-06-23

## 2019-06-23 ENCOUNTER — HOSPITAL ENCOUNTER (INPATIENT)
Dept: CARDIOLOGY | Facility: HOSPITAL | Age: 69
Discharge: HOME | End: 2019-06-23

## 2019-06-23 LAB
ABO GROUP BLD: NORMAL
ALBUMIN SERPL-MCNC: 4 G/DL (ref 3.5–4.8)
ALBUMIN/GLOB SERPL: 1.3 G/DL (ref 1–1.7)
ALP SERPL-CCNC: 66 U/L (ref 32–91)
ALT SERPL W P-5'-P-CCNC: 31 U/L (ref 17–63)
ANION GAP SERPL CALCULATED.3IONS-SCNC: 9 MMOL/L (ref 10–20)
ANION GAP SERPL CALCULATED.3IONS-SCNC: 9 MMOL/L (ref 10–20)
APTT PPP: 30.2 SECONDS (ref 24–31)
ARTERIAL PATENCY WRIST A: POSITIVE
AST SERPL-CCNC: 25 U/L (ref 15–41)
ATMOSPHERIC PRESS: ABNORMAL MMHG
BASE EXCESS BLDA CALC-SCNC: -0.2 MMOL/L (ref 0–3)
BASOPHILS # BLD AUTO: 0.1 10*3/MM3 (ref 0–0.2)
BASOPHILS NFR BLD AUTO: 1 % (ref 0–1.5)
BDY SITE: ABNORMAL
BH CV XLRA MEAS - DIST GSV THIGH DIST LEFT: 0.31 CM
BH CV XLRA MEAS - DIST GSV THIGH DIST RIGHT: 0.17 CM
BH CV XLRA MEAS - GSV ANKLE DIST LEFT: 0.15 CM
BH CV XLRA MEAS - GSV ANKLE DIST RIGHT: 0.92 CM
BH CV XLRA MEAS - GSV ORIGIN DIST LEFT: 0.29 CM
BH CV XLRA MEAS - GSV ORIGIN DIST RIGHT: 0.27 CM
BH CV XLRA MEAS - MID GSV CALF LEFT: 0.17 CM
BH CV XLRA MEAS - MID GSV CALF RIGHT: 0.11 CM
BH CV XLRA MEAS - MID GSV THIGH  LEFT: 0.29 CM
BH CV XLRA MEAS - MID GSV THIGH  RIGHT: 0.21 CM
BH CV XLRA MEAS - PROX GSV CALF DIST LEFT: 0.26 CM
BH CV XLRA MEAS - PROX GSV CALF DIST RIGHT: 0.14 CM
BILIRUB SERPL-MCNC: 0.3 MG/DL (ref 0.3–1.2)
BLD GP AB SCN SERPL QL: NEGATIVE
BUN BLD-MCNC: 20 MG/DL (ref 8–20)
BUN BLD-MCNC: 21 MG/DL (ref 8–20)
BUN/CREAT SERPL: 16.7 (ref 6.2–20.3)
BUN/CREAT SERPL: 17.5 (ref 6.2–20.3)
CALCIUM SPEC-SCNC: 9.2 MG/DL (ref 8.9–10.3)
CALCIUM SPEC-SCNC: 9.3 MG/DL (ref 8.9–10.3)
CHLORIDE SERPL-SCNC: 103 MMOL/L (ref 101–111)
CHLORIDE SERPL-SCNC: 104 MMOL/L (ref 101–111)
CLOSE TME COLL+ADP + EPINEP PNL BLD: 92 % (ref 86–100)
CO2 BLDA-SCNC: 24.5 MMOL/L (ref 22–29)
CO2 SERPL-SCNC: 22 MMOL/L (ref 22–32)
CO2 SERPL-SCNC: 23 MMOL/L (ref 22–32)
CREAT BLD-MCNC: 1.2 MG/DL (ref 0.7–1.2)
CREAT BLD-MCNC: 1.2 MG/DL (ref 0.7–1.2)
DEPRECATED RDW RBC AUTO: 44.2 FL (ref 37–54)
EOSINOPHIL # BLD AUTO: 0.2 10*3/MM3 (ref 0–0.4)
EOSINOPHIL NFR BLD AUTO: 3.1 % (ref 0.3–6.2)
ERYTHROCYTE [DISTWIDTH] IN BLOOD BY AUTOMATED COUNT: 13.6 % (ref 12.3–15.4)
GFR SERPL CREATININE-BSD FRML MDRD: 60 ML/MIN/1.73
GFR SERPL CREATININE-BSD FRML MDRD: 60 ML/MIN/1.73
GLOBULIN UR ELPH-MCNC: 3.1 GM/DL (ref 2.5–3.8)
GLUCOSE BLD-MCNC: 104 MG/DL (ref 65–99)
GLUCOSE BLD-MCNC: 109 MG/DL (ref 65–99)
HCO3 BLDA-SCNC: 23.4 MMOL/L (ref 21–28)
HCT VFR BLD AUTO: 39.2 % (ref 37.5–51)
HEMODILUTION: NO
HGB BLD-MCNC: 13.7 G/DL (ref 13–17.7)
HOROWITZ INDEX BLD+IHG-RTO: 21 %
INR PPP: 0.98 (ref 0.9–1.1)
LYMPHOCYTES # BLD AUTO: 1.4 10*3/MM3 (ref 0.7–3.1)
LYMPHOCYTES NFR BLD AUTO: 23 % (ref 19.6–45.3)
MAGNESIUM SERPL-MCNC: 2 MG/DL (ref 1.8–2.5)
MCH RBC QN AUTO: 32.3 PG (ref 26.6–33)
MCHC RBC AUTO-ENTMCNC: 34.8 G/DL (ref 31.5–35.7)
MCV RBC AUTO: 92.7 FL (ref 79–97)
MODALITY: ABNORMAL
MONOCYTES # BLD AUTO: 0.6 10*3/MM3 (ref 0.1–0.9)
MONOCYTES NFR BLD AUTO: 9.8 % (ref 5–12)
NEUTROPHILS # BLD AUTO: 4 10*3/MM3 (ref 1.7–7)
NEUTROPHILS NFR BLD AUTO: 63.1 % (ref 42.7–76)
NRBC BLD AUTO-RTO: 0.2 /100 WBC (ref 0–0.2)
PCO2 BLDA: 34.1 MM HG (ref 35–48)
PH BLDA: 7.44 PH UNITS (ref 7.35–7.45)
PLATELET # BLD AUTO: 132 10*3/MM3 (ref 140–450)
PMV BLD AUTO: 9 FL (ref 6–12)
PO2 BLDA: 92.2 MM HG (ref 83–108)
POTASSIUM BLD-SCNC: 4.1 MMOL/L (ref 3.6–5.1)
POTASSIUM BLD-SCNC: 4.4 MMOL/L (ref 3.6–5.1)
PREALB SERPL-MCNC: 29.3 MG/DL (ref 16–38)
PROT SERPL-MCNC: 7.1 G/DL (ref 6.1–7.9)
PROTHROMBIN TIME: 10.1 SECONDS (ref 9.6–11.7)
RBC # BLD AUTO: 4.23 10*6/MM3 (ref 4.14–5.8)
RESPIRATORY RATE: 18
RH BLD: POSITIVE
SAO2 % BLDCOA: 97.6 % (ref 94–98)
SODIUM BLD-SCNC: 135 MMOL/L (ref 136–144)
SODIUM BLD-SCNC: 135 MMOL/L (ref 136–144)
T&S EXPIRATION DATE: NORMAL
WBC NRBC COR # BLD: 6.3 10*3/MM3 (ref 3.4–10.8)

## 2019-06-23 PROCEDURE — 85730 THROMBOPLASTIN TIME PARTIAL: CPT | Performed by: THORACIC SURGERY (CARDIOTHORACIC VASCULAR SURGERY)

## 2019-06-23 PROCEDURE — 36600 WITHDRAWAL OF ARTERIAL BLOOD: CPT

## 2019-06-23 PROCEDURE — 99254 IP/OBS CNSLTJ NEW/EST MOD 60: CPT | Performed by: THORACIC SURGERY (CARDIOTHORACIC VASCULAR SURGERY)

## 2019-06-23 PROCEDURE — 86901 BLOOD TYPING SEROLOGIC RH(D): CPT

## 2019-06-23 PROCEDURE — 94729 DIFFUSING CAPACITY: CPT

## 2019-06-23 PROCEDURE — 86923 COMPATIBILITY TEST ELECTRIC: CPT

## 2019-06-23 PROCEDURE — 94726 PLETHYSMOGRAPHY LUNG VOLUMES: CPT

## 2019-06-23 PROCEDURE — 94010 BREATHING CAPACITY TEST: CPT

## 2019-06-23 PROCEDURE — 85610 PROTHROMBIN TIME: CPT | Performed by: THORACIC SURGERY (CARDIOTHORACIC VASCULAR SURGERY)

## 2019-06-23 PROCEDURE — 80048 BASIC METABOLIC PNL TOTAL CA: CPT | Performed by: INTERNAL MEDICINE

## 2019-06-23 PROCEDURE — 25010000002 ENOXAPARIN PER 10 MG: Performed by: NURSE PRACTITIONER

## 2019-06-23 PROCEDURE — 93970 EXTREMITY STUDY: CPT

## 2019-06-23 PROCEDURE — 83735 ASSAY OF MAGNESIUM: CPT | Performed by: THORACIC SURGERY (CARDIOTHORACIC VASCULAR SURGERY)

## 2019-06-23 PROCEDURE — 86900 BLOOD TYPING SEROLOGIC ABO: CPT

## 2019-06-23 PROCEDURE — 82803 BLOOD GASES ANY COMBINATION: CPT

## 2019-06-23 PROCEDURE — 86850 RBC ANTIBODY SCREEN: CPT | Performed by: THORACIC SURGERY (CARDIOTHORACIC VASCULAR SURGERY)

## 2019-06-23 PROCEDURE — 85025 COMPLETE CBC W/AUTO DIFF WBC: CPT | Performed by: THORACIC SURGERY (CARDIOTHORACIC VASCULAR SURGERY)

## 2019-06-23 PROCEDURE — 93010 ELECTROCARDIOGRAM REPORT: CPT | Performed by: INTERNAL MEDICINE

## 2019-06-23 PROCEDURE — 85576 BLOOD PLATELET AGGREGATION: CPT | Performed by: THORACIC SURGERY (CARDIOTHORACIC VASCULAR SURGERY)

## 2019-06-23 PROCEDURE — 93005 ELECTROCARDIOGRAM TRACING: CPT | Performed by: THORACIC SURGERY (CARDIOTHORACIC VASCULAR SURGERY)

## 2019-06-23 PROCEDURE — 99232 SBSQ HOSP IP/OBS MODERATE 35: CPT | Performed by: INTERNAL MEDICINE

## 2019-06-23 PROCEDURE — 25010000002 HYDRALAZINE PER 20 MG

## 2019-06-23 PROCEDURE — 80053 COMPREHEN METABOLIC PANEL: CPT | Performed by: THORACIC SURGERY (CARDIOTHORACIC VASCULAR SURGERY)

## 2019-06-23 PROCEDURE — 83036 HEMOGLOBIN GLYCOSYLATED A1C: CPT | Performed by: THORACIC SURGERY (CARDIOTHORACIC VASCULAR SURGERY)

## 2019-06-23 PROCEDURE — 71046 X-RAY EXAM CHEST 2 VIEWS: CPT

## 2019-06-23 PROCEDURE — 84134 ASSAY OF PREALBUMIN: CPT | Performed by: THORACIC SURGERY (CARDIOTHORACIC VASCULAR SURGERY)

## 2019-06-23 PROCEDURE — 86901 BLOOD TYPING SEROLOGIC RH(D): CPT | Performed by: THORACIC SURGERY (CARDIOTHORACIC VASCULAR SURGERY)

## 2019-06-23 PROCEDURE — 86900 BLOOD TYPING SEROLOGIC ABO: CPT | Performed by: THORACIC SURGERY (CARDIOTHORACIC VASCULAR SURGERY)

## 2019-06-23 RX ORDER — HYDRALAZINE HYDROCHLORIDE 20 MG/ML
20 INJECTION INTRAMUSCULAR; INTRAVENOUS EVERY 4 HOURS PRN
Status: DISCONTINUED | OUTPATIENT
Start: 2019-06-23 | End: 2019-06-24 | Stop reason: HOSPADM

## 2019-06-23 RX ORDER — CHLORHEXIDINE GLUCONATE 0.12 MG/ML
15 RINSE ORAL ONCE
Status: COMPLETED | OUTPATIENT
Start: 2019-06-24 | End: 2019-06-24

## 2019-06-23 RX ORDER — HYDRALAZINE HYDROCHLORIDE 20 MG/ML
INJECTION INTRAMUSCULAR; INTRAVENOUS
Status: COMPLETED
Start: 2019-06-23 | End: 2019-06-23

## 2019-06-23 RX ORDER — CHLORHEXIDINE GLUCONATE 500 MG/1
1 CLOTH TOPICAL EVERY 12 HOURS PRN
Status: DISCONTINUED | OUTPATIENT
Start: 2019-06-23 | End: 2019-06-24 | Stop reason: HOSPADM

## 2019-06-23 RX ORDER — ALBUTEROL SULFATE 2.5 MG/3ML
2.5 SOLUTION RESPIRATORY (INHALATION) ONCE
Status: DISCONTINUED | OUTPATIENT
Start: 2019-06-23 | End: 2019-06-24 | Stop reason: HOSPADM

## 2019-06-23 RX ORDER — ACETAMINOPHEN 325 MG/1
650 TABLET ORAL EVERY 4 HOURS PRN
Status: DISCONTINUED | OUTPATIENT
Start: 2019-06-23 | End: 2019-06-24 | Stop reason: HOSPADM

## 2019-06-23 RX ADMIN — ATENOLOL 50 MG: 50 TABLET ORAL at 08:46

## 2019-06-23 RX ADMIN — ACETAMINOPHEN 650 MG: 325 TABLET, FILM COATED ORAL at 14:28

## 2019-06-23 RX ADMIN — MELATONIN TAB 5 MG 5 MG: 5 TAB at 21:14

## 2019-06-23 RX ADMIN — Medication 3 ML: at 08:49

## 2019-06-23 RX ADMIN — ASPIRIN 81 MG: 81 TABLET, COATED ORAL at 08:46

## 2019-06-23 RX ADMIN — ENOXAPARIN SODIUM 40 MG: 40 INJECTION SUBCUTANEOUS at 14:28

## 2019-06-23 RX ADMIN — HYDRALAZINE HYDROCHLORIDE 20 MG: 20 INJECTION INTRAMUSCULAR; INTRAVENOUS at 19:25

## 2019-06-23 RX ADMIN — MUPIROCIN 1 APPLICATION: 20 OINTMENT TOPICAL at 21:15

## 2019-06-23 RX ADMIN — Medication 3 ML: at 21:16

## 2019-06-23 RX ADMIN — ATORVASTATIN CALCIUM 40 MG: 40 TABLET, FILM COATED ORAL at 21:13

## 2019-06-23 RX ADMIN — TEMAZEPAM 15 MG: 15 CAPSULE ORAL at 21:14

## 2019-06-23 RX ADMIN — SODIUM CHLORIDE 75 ML/HR: 900 INJECTION, SOLUTION INTRAVENOUS at 06:37

## 2019-06-24 ENCOUNTER — ANESTHESIA EVENT (OUTPATIENT)
Dept: PERIOP | Facility: HOSPITAL | Age: 69
End: 2019-06-24

## 2019-06-24 ENCOUNTER — APPOINTMENT (OUTPATIENT)
Dept: GENERAL RADIOLOGY | Facility: HOSPITAL | Age: 69
End: 2019-06-24

## 2019-06-24 ENCOUNTER — ANESTHESIA (OUTPATIENT)
Dept: PERIOP | Facility: HOSPITAL | Age: 69
End: 2019-06-24

## 2019-06-24 PROBLEM — I25.110 CORONARY ARTERY DISEASE INVOLVING NATIVE CORONARY ARTERY WITH UNSTABLE ANGINA PECTORIS: Status: ACTIVE | Noted: 2019-06-24

## 2019-06-24 LAB
ABO + RH BLD: NORMAL
ABO + RH BLD: NORMAL
ACT BLD: 120 SECONDS (ref 89–137)
ACT BLD: 125 SECONDS (ref 89–137)
ACT BLD: 312 SECONDS (ref 89–137)
ACT BLD: 340 SECONDS (ref 89–137)
ACT BLD: 378 SECONDS (ref 89–137)
ACT BLD: 390 SECONDS (ref 89–137)
ACT BLD: 499 SECONDS (ref 89–137)
ALBUMIN SERPL-MCNC: 4.3 G/DL (ref 3.5–4.8)
ALBUMIN SERPL-MCNC: 5 G/DL (ref 3.5–4.8)
ANION GAP SERPL CALCULATED.3IONS-SCNC: 12 MMOL/L (ref 10–20)
ANION GAP SERPL CALCULATED.3IONS-SCNC: 12 MMOL/L (ref 10–20)
ANION GAP SERPL CALCULATED.3IONS-SCNC: 13 MMOL/L (ref 10–20)
ANION GAP SERPL CALCULATED.3IONS-SCNC: 9 MMOL/L (ref 10–20)
APTT PPP: 26.6 SECONDS (ref 24–31)
ARTERIAL PATENCY WRIST A: ABNORMAL
ATMOSPHERIC PRESS: ABNORMAL MMHG
BACTERIA UR QL AUTO: ABNORMAL /HPF
BASE DEFICIT: ABNORMAL MEQ/LITER
BASE EXCESS BLDA CALC-SCNC: -0.2 MMOL/L (ref 0–3)
BASE EXCESS BLDA CALC-SCNC: 0 MMOL/L (ref 0–3)
BASE EXCESS BLDA CALC-SCNC: 0 MMOL/L (ref 0–3)
BASE EXCESS BLDA CALC-SCNC: 0.3 MMOL/L (ref 0–3)
BASE EXCESS BLDA CALC-SCNC: 0.6 MMOL/L (ref 0–3)
BASE EXCESS BLDA CALC-SCNC: 1 MMOL/L (ref 0–3)
BASE EXCESS BLDA CALC-SCNC: 2.5 MMOL/L (ref 0–3)
BASE EXCESS BLDA CALC-SCNC: <0 MMOL/L (ref 0–3)
BASE EXCESS BLDA CALC-SCNC: <0 MMOL/L (ref 0–3)
BASE EXCESS BLDV CALC-SCNC: ABNORMAL MMOL/L (ref 0–3)
BASOPHILS # BLD AUTO: 0 10*3/MM3 (ref 0–0.2)
BASOPHILS NFR BLD AUTO: 0.4 % (ref 0–1.5)
BDY SITE: ABNORMAL
BH BB BLOOD EXPIRATION DATE: NORMAL
BH BB BLOOD EXPIRATION DATE: NORMAL
BH BB BLOOD TYPE BARCODE: 5100
BH BB BLOOD TYPE BARCODE: 5100
BH BB DISPENSE STATUS: NORMAL
BH BB DISPENSE STATUS: NORMAL
BH BB PRODUCT CODE: NORMAL
BH BB PRODUCT CODE: NORMAL
BH BB UNIT NUMBER: NORMAL
BH BB UNIT NUMBER: NORMAL
BILIRUB UR QL STRIP: NEGATIVE
BUN BLD-MCNC: 17 MG/DL (ref 8–20)
BUN BLD-MCNC: 18 MG/DL (ref 8–20)
BUN BLD-MCNC: 19 MG/DL (ref 8–20)
BUN BLD-MCNC: 20 MG/DL (ref 8–20)
BUN/CREAT SERPL: 11.2 (ref 6.2–20.3)
BUN/CREAT SERPL: 11.3 (ref 6.2–20.3)
BUN/CREAT SERPL: 12 (ref 6.2–20.3)
BUN/CREAT SERPL: 15.4 (ref 6.2–20.3)
CA-I BLDA-SCNC: 0.93 MMOL/L (ref 1.15–1.33)
CA-I BLDA-SCNC: 0.97 MMOL/L (ref 1.15–1.33)
CA-I BLDA-SCNC: 0.99 MMOL/L (ref 1.12–1.32)
CA-I BLDA-SCNC: 1.02 MMOL/L (ref 1.12–1.32)
CA-I BLDA-SCNC: 1.19 MMOL/L (ref 1.15–1.33)
CA-I BLDA-SCNC: 1.2 MMOL/L (ref 1.15–1.33)
CA-I BLDA-SCNC: 1.22 MMOL/L (ref 1.12–1.32)
CA-I BLDA-SCNC: 1.59 MMOL/L (ref 1.12–1.32)
CA-I SERPL ISE-MCNC: 1.22 MMOL/L (ref 1.2–1.3)
CA-I SERPL ISE-MCNC: 1.31 MMOL/L (ref 1.2–1.3)
CALCIUM SPEC-SCNC: 9.2 MG/DL (ref 8.9–10.3)
CALCIUM SPEC-SCNC: 9.4 MG/DL (ref 8.9–10.3)
CALCIUM SPEC-SCNC: 9.5 MG/DL (ref 8.9–10.3)
CALCIUM SPEC-SCNC: 9.6 MG/DL (ref 8.9–10.3)
CHLORIDE SERPL-SCNC: 102 MMOL/L (ref 101–111)
CHLORIDE SERPL-SCNC: 103 MMOL/L (ref 101–111)
CHLORIDE SERPL-SCNC: 104 MMOL/L (ref 101–111)
CHLORIDE SERPL-SCNC: 104 MMOL/L (ref 101–111)
CLARITY UR: CLEAR
CO2 BLDA-SCNC: 25 MMOL/L (ref 23–27)
CO2 BLDA-SCNC: 26.8 MMOL/L (ref 22–29)
CO2 BLDA-SCNC: 26.8 MMOL/L (ref 22–29)
CO2 BLDA-SCNC: 27 MMOL/L (ref 23–27)
CO2 BLDA-SCNC: 27 MMOL/L (ref 23–27)
CO2 BLDA-SCNC: 27.3 MMOL/L (ref 22–29)
CO2 BLDA-SCNC: 28.6 MMOL/L (ref 22–29)
CO2 CONTENT VENOUS: 30 MMOL/L (ref 24–29)
CO2 SERPL-SCNC: 19 MMOL/L (ref 22–32)
CO2 SERPL-SCNC: 20 MMOL/L (ref 22–32)
CO2 SERPL-SCNC: 22 MMOL/L (ref 22–32)
CO2 SERPL-SCNC: 23 MMOL/L (ref 22–32)
COLOR UR: YELLOW
CREAT BLD-MCNC: 1.3 MG/DL (ref 0.7–1.2)
CREAT BLD-MCNC: 1.5 MG/DL (ref 0.7–1.2)
CREAT BLD-MCNC: 1.5 MG/DL (ref 0.7–1.2)
CREAT BLD-MCNC: 1.7 MG/DL (ref 0.7–1.2)
DEPRECATED RDW RBC AUTO: 43.3 FL (ref 37–54)
DEPRECATED RDW RBC AUTO: 45.5 FL (ref 37–54)
EOSINOPHIL # BLD AUTO: 0.2 10*3/MM3 (ref 0–0.4)
EOSINOPHIL # BLD MANUAL: 0.24 10*3/MM3 (ref 0–0.4)
EOSINOPHIL NFR BLD AUTO: 2.7 % (ref 0.3–6.2)
EOSINOPHIL NFR BLD MANUAL: 2 % (ref 0.3–6.2)
ERYTHROCYTE [DISTWIDTH] IN BLOOD BY AUTOMATED COUNT: 13.3 % (ref 12.3–15.4)
ERYTHROCYTE [DISTWIDTH] IN BLOOD BY AUTOMATED COUNT: 13.4 % (ref 12.3–15.4)
ERYTHROCYTE [DISTWIDTH] IN BLOOD BY AUTOMATED COUNT: 13.7 % (ref 12.3–15.4)
FIBRINOGEN PPP-MCNC: 225 MG/DL (ref 210–450)
GFR SERPL CREATININE-BSD FRML MDRD: 40 ML/MIN/1.73
GFR SERPL CREATININE-BSD FRML MDRD: 47 ML/MIN/1.73
GFR SERPL CREATININE-BSD FRML MDRD: 47 ML/MIN/1.73
GFR SERPL CREATININE-BSD FRML MDRD: 55 ML/MIN/1.73
GLUCOSE BLD-MCNC: 141 MG/DL (ref 65–99)
GLUCOSE BLD-MCNC: 95 MG/DL (ref 65–99)
GLUCOSE BLD-MCNC: 96 MG/DL (ref 65–99)
GLUCOSE BLD-MCNC: 96 MG/DL (ref 65–99)
GLUCOSE BLDC GLUCOMTR-MCNC: 111 MG/DL (ref 70–105)
GLUCOSE BLDC GLUCOMTR-MCNC: 112 MG/DL (ref 74–100)
GLUCOSE BLDC GLUCOMTR-MCNC: 114 MG/DL (ref 70–105)
GLUCOSE BLDC GLUCOMTR-MCNC: 115 MG/DL (ref 70–105)
GLUCOSE BLDC GLUCOMTR-MCNC: 132 MG/DL (ref 70–105)
GLUCOSE BLDC GLUCOMTR-MCNC: 137 MG/DL (ref 70–105)
GLUCOSE BLDC GLUCOMTR-MCNC: 143 MG/DL (ref 70–105)
GLUCOSE BLDC GLUCOMTR-MCNC: 143 MG/DL (ref 74–100)
GLUCOSE BLDC GLUCOMTR-MCNC: 156 MG/DL (ref 74–100)
GLUCOSE BLDC GLUCOMTR-MCNC: 163 MG/DL (ref 70–105)
GLUCOSE BLDC GLUCOMTR-MCNC: 181 MG/DL (ref 70–105)
GLUCOSE BLDC GLUCOMTR-MCNC: 187 MG/DL (ref 70–105)
GLUCOSE BLDC GLUCOMTR-MCNC: 87 MG/DL (ref 74–100)
GLUCOSE UR STRIP-MCNC: NEGATIVE MG/DL
HBA1C MFR BLD: 5.5 % (ref 3.5–5.6)
HCO3 BLDA-SCNC: 23.7 MMOL/L (ref 22–26)
HCO3 BLDA-SCNC: 24 MMOL/L (ref 22–26)
HCO3 BLDA-SCNC: 24.2 MMOL/L (ref 22–26)
HCO3 BLDA-SCNC: 25.3 MMOL/L (ref 22–26)
HCO3 BLDA-SCNC: 25.5 MMOL/L (ref 21–28)
HCO3 BLDA-SCNC: 25.5 MMOL/L (ref 21–28)
HCO3 BLDA-SCNC: 25.8 MMOL/L (ref 21–28)
HCO3 BLDA-SCNC: 26.1 MMOL/L (ref 22–26)
HCO3 BLDA-SCNC: 27.3 MMOL/L (ref 21–28)
HCO3 BLDV-SCNC: 28.9 MMOL/L (ref 23–28)
HCT VFR BLD AUTO: 33.7 % (ref 37.5–51)
HCT VFR BLD AUTO: 35.3 % (ref 37.5–51)
HCT VFR BLD AUTO: 35.3 % (ref 37.5–51)
HCT VFR BLD AUTO: 39 % (ref 37.5–51)
HCT VFR BLDA CALC: 28 % (ref 38–51)
HCT VFR BLDA CALC: 29 % (ref 38–51)
HCT VFR BLDA CALC: 30 % (ref 38–51)
HCT VFR BLDA CALC: 33 % (ref 38–51)
HCT VFR BLDA CALC: 33 % (ref 38–51)
HCT VFR BLDA CALC: 35 % (ref 38–51)
HCT VFR BLDA CALC: 36 % (ref 38–51)
HCT VFR BLDA CALC: 37 % (ref 38–51)
HEMODILUTION: YES
HGB BLD-MCNC: 11.4 G/DL (ref 13–17.7)
HGB BLD-MCNC: 12.4 G/DL (ref 13–17.7)
HGB BLD-MCNC: 12.4 G/DL (ref 13–17.7)
HGB BLD-MCNC: 13.3 G/DL (ref 13–17.7)
HGB BLDA-MCNC: 10.2 G/DL (ref 12–17)
HGB BLDA-MCNC: 11.1 G/DL (ref 12–17)
HGB BLDA-MCNC: 11.3 G/DL (ref 12–17)
HGB BLDA-MCNC: 12 G/DL (ref 12–17)
HGB BLDA-MCNC: 12.1 G/DL (ref 12–17)
HGB BLDA-MCNC: 12.6 G/DL (ref 12–17)
HGB BLDA-MCNC: 9.5 G/DL (ref 12–17)
HGB BLDA-MCNC: 9.9 G/DL (ref 12–17)
HGB UR QL STRIP.AUTO: NEGATIVE
HOROWITZ INDEX BLD+IHG-RTO: 40 %
HOROWITZ INDEX BLD+IHG-RTO: 70 %
HYALINE CASTS UR QL AUTO: ABNORMAL /LPF
INR PPP: 1.25 (ref 0.9–1.1)
KETONES UR QL STRIP: NEGATIVE
LEUKOCYTE ESTERASE UR QL STRIP.AUTO: NEGATIVE
LYMPHOCYTES # BLD AUTO: 1.1 10*3/MM3 (ref 0.7–3.1)
LYMPHOCYTES # BLD MANUAL: 0.94 10*3/MM3 (ref 0.7–3.1)
LYMPHOCYTES NFR BLD AUTO: 17 % (ref 19.6–45.3)
LYMPHOCYTES NFR BLD MANUAL: 7 % (ref 5–12)
LYMPHOCYTES NFR BLD MANUAL: 8 % (ref 19.6–45.3)
MAGNESIUM SERPL-MCNC: 2.6 MG/DL (ref 1.8–2.5)
MAGNESIUM SERPL-MCNC: 3.2 MG/DL (ref 1.8–2.5)
MCH RBC QN AUTO: 31.8 PG (ref 26.6–33)
MCH RBC QN AUTO: 31.9 PG (ref 26.6–33)
MCH RBC QN AUTO: 32.1 PG (ref 26.6–33)
MCH RBC QN AUTO: 32.1 PG (ref 26.6–33)
MCHC RBC AUTO-ENTMCNC: 33.9 G/DL (ref 31.5–35.7)
MCHC RBC AUTO-ENTMCNC: 34.1 G/DL (ref 31.5–35.7)
MCHC RBC AUTO-ENTMCNC: 35.1 G/DL (ref 31.5–35.7)
MCHC RBC AUTO-ENTMCNC: 35.1 G/DL (ref 31.5–35.7)
MCV RBC AUTO: 91.4 FL (ref 79–97)
MCV RBC AUTO: 91.4 FL (ref 79–97)
MCV RBC AUTO: 93.7 FL (ref 79–97)
MCV RBC AUTO: 93.7 FL (ref 79–97)
MODALITY: ABNORMAL
MONOCYTES # BLD AUTO: 0.6 10*3/MM3 (ref 0.1–0.9)
MONOCYTES # BLD AUTO: 0.83 10*3/MM3 (ref 0.1–0.9)
MONOCYTES NFR BLD AUTO: 9.7 % (ref 5–12)
NEUTROPHILS # BLD AUTO: 4.5 10*3/MM3 (ref 1.7–7)
NEUTROPHILS # BLD AUTO: 9.79 10*3/MM3 (ref 1.7–7)
NEUTROPHILS NFR BLD AUTO: 70.2 % (ref 42.7–76)
NEUTROPHILS NFR BLD MANUAL: 82 % (ref 42.7–76)
NEUTS BAND NFR BLD MANUAL: 1 % (ref 0–5)
NITRITE UR QL STRIP: NEGATIVE
NRBC BLD AUTO-RTO: 0.1 /100 WBC (ref 0–0.2)
PCO2 BLDA: 38 MM HG (ref 35–45)
PCO2 BLDA: 41.2 MM HG (ref 35–48)
PCO2 BLDA: 42.3 MM HG (ref 35–48)
PCO2 BLDA: 42.8 MM HG (ref 35–48)
PCO2 BLDA: 43 MM HG (ref 35–45)
PCO2 BLDA: 44 MM HG (ref 35–45)
PCO2 BLDA: 46 MM HG (ref 35–45)
PCO2 BLDA: 47 MM HG (ref 35–45)
PCO2 BLDA: 47.1 MM HG (ref 35–48)
PCO2 BLDV: 50 MM HG (ref 41–51)
PEEP RESPIRATORY: 5 CM[H2O]
PH BLDA: 7.31 PH UNITS (ref 7.35–7.45)
PH BLDA: 7.34 PH UNITS (ref 7.35–7.45)
PH BLDA: 7.35 PH UNITS (ref 7.35–7.45)
PH BLDA: 7.35 PH UNITS (ref 7.35–7.45)
PH BLDA: 7.38 PH UNITS (ref 7.35–7.45)
PH BLDA: 7.39 PH UNITS (ref 7.35–7.45)
PH BLDA: 7.4 PH UNITS (ref 7.35–7.45)
PH BLDA: 7.41 PH UNITS (ref 7.35–7.45)
PH BLDA: 7.42 PH UNITS (ref 7.35–7.45)
PH BLDV: 7.37 PH UNITS (ref 7.31–7.41)
PH UR STRIP.AUTO: 5.5 [PH] (ref 5–8)
PHOSPHATE SERPL-MCNC: 1.9 MG/DL (ref 2.4–4.7)
PHOSPHATE SERPL-MCNC: 1.9 MG/DL (ref 2.4–4.7)
PHOSPHATE SERPL-MCNC: 3.3 MG/DL (ref 2.4–4.7)
PLAT MORPH BLD: NORMAL
PLATELET # BLD AUTO: 123 10*3/MM3 (ref 140–450)
PLATELET # BLD AUTO: 80 10*3/MM3 (ref 140–450)
PLATELET # BLD AUTO: 81 10*3/MM3 (ref 140–450)
PLATELET # BLD AUTO: 81 10*3/MM3 (ref 140–450)
PMV BLD AUTO: 8.9 FL (ref 6–12)
PMV BLD AUTO: 9.1 FL (ref 6–12)
PMV BLD AUTO: 9.1 FL (ref 6–12)
PO2 BLDA: 121.2 MM HG (ref 83–108)
PO2 BLDA: 121.8 MM HG (ref 83–108)
PO2 BLDA: 136.6 MM HG (ref 83–108)
PO2 BLDA: 180 MM HG (ref 80–105)
PO2 BLDA: 217.3 MM HG (ref 83–108)
PO2 BLDA: 317 MM HG (ref 80–105)
PO2 BLDA: 339 MM HG (ref 80–105)
PO2 BLDA: 382 MM HG (ref 80–105)
PO2 BLDA: 429 MM HG (ref 80–105)
PO2 BLDV: 41 MM HG
POTASSIUM BLD-SCNC: 3.9 MMOL/L (ref 3.6–5.1)
POTASSIUM BLD-SCNC: 3.9 MMOL/L (ref 3.6–5.1)
POTASSIUM BLD-SCNC: 4.1 MMOL/L (ref 3.6–5.1)
POTASSIUM BLD-SCNC: 4.6 MMOL/L (ref 3.6–5.1)
POTASSIUM BLDA-SCNC: 3.7 MMOL/L (ref 3.5–4.5)
POTASSIUM BLDA-SCNC: 3.9 MMOL/L (ref 3.5–4.9)
POTASSIUM BLDA-SCNC: 4.8 MMOL/L (ref 3.5–4.5)
POTASSIUM BLDA-SCNC: 4.8 MMOL/L (ref 3.5–4.5)
POTASSIUM BLDA-SCNC: 4.8 MMOL/L (ref 3.5–4.9)
POTASSIUM BLDA-SCNC: 5.2 MMOL/L (ref 3.5–4.5)
POTASSIUM BLDA-SCNC: 5.5 MMOL/L (ref 3.5–4.9)
POTASSIUM BLDA-SCNC: 5.8 MMOL/L (ref 3.5–4.9)
POTASSIUM BLDA-SCNC: 6.6 MMOL/L (ref 3.5–4.9)
POTASSIUM BLDA-SCNC: 6.9 MMOL/L (ref 3.5–4.9)
PROT UR QL STRIP: ABNORMAL
PROTHROMBIN TIME: 12.5 SECONDS (ref 9.6–11.7)
PSV: 10 CMH2O
RBC # BLD AUTO: 3.6 10*6/MM3 (ref 4.14–5.8)
RBC # BLD AUTO: 3.86 10*6/MM3 (ref 4.14–5.8)
RBC # BLD AUTO: 3.86 10*6/MM3 (ref 4.14–5.8)
RBC # BLD AUTO: 4.16 10*6/MM3 (ref 4.14–5.8)
RBC # UR: ABNORMAL /HPF
RBC MORPH BLD: NORMAL
REF LAB TEST METHOD: ABNORMAL
RESPIRATORY RATE: 12
SAO2 % BLDCOA: 100 % (ref 95–98)
SAO2 % BLDCOA: 98.5 % (ref 94–98)
SAO2 % BLDCOA: 98.8 % (ref 94–98)
SAO2 % BLDCOA: 99.1 % (ref 94–98)
SAO2 % BLDCOA: 99.8 % (ref 94–98)
SAO2 % BLDCOV: 74 %
SCAN SLIDE: NORMAL
SODIUM BLD-SCNC: 135 MMOL/L (ref 136–144)
SODIUM BLD-SCNC: 135 MMOL/L (ref 136–144)
SODIUM BLD-SCNC: 136 MMOL/L (ref 136–144)
SODIUM BLD-SCNC: 137 MMOL/L (ref 136–144)
SODIUM BLDA-SCNC: 129 MMOL/L (ref 138–146)
SODIUM BLDA-SCNC: 132 MMOL/L (ref 138–146)
SODIUM BLDA-SCNC: 133 MMOL/L (ref 138–146)
SODIUM BLDA-SCNC: 133 MMOL/L (ref 138–146)
SODIUM BLDA-SCNC: 135 MMOL/L (ref 138–146)
SODIUM BLDA-SCNC: 138 MMOL/L (ref 138–146)
SODIUM BLDA-SCNC: 139 MMOL/L (ref 138–146)
SODIUM BLDA-SCNC: 139 MMOL/L (ref 138–146)
SP GR UR STRIP: 1.02 (ref 1–1.03)
SQUAMOUS #/AREA URNS HPF: ABNORMAL /HPF
UNIT  ABO: NORMAL
UNIT  ABO: NORMAL
UNIT  RH: NORMAL
UNIT  RH: NORMAL
UROBILINOGEN UR QL STRIP: ABNORMAL
VENTILATOR MODE: ABNORMAL
VT ON VENT VENT: 600 ML
WBC MORPH BLD: NORMAL
WBC NRBC COR # BLD: 11.4 10*3/MM3 (ref 3.4–10.8)
WBC NRBC COR # BLD: 11.8 10*3/MM3 (ref 3.4–10.8)
WBC NRBC COR # BLD: 11.8 10*3/MM3 (ref 3.4–10.8)
WBC NRBC COR # BLD: 6.4 10*3/MM3 (ref 3.4–10.8)
WBC UR QL AUTO: ABNORMAL /HPF

## 2019-06-24 PROCEDURE — 85027 COMPLETE CBC AUTOMATED: CPT | Performed by: NURSE PRACTITIONER

## 2019-06-24 PROCEDURE — 06BQ4ZZ EXCISION OF LEFT SAPHENOUS VEIN, PERCUTANEOUS ENDOSCOPIC APPROACH: ICD-10-PCS | Performed by: THORACIC SURGERY (CARDIOTHORACIC VASCULAR SURGERY)

## 2019-06-24 PROCEDURE — 25010000002 HYDRALAZINE PER 20 MG: Performed by: THORACIC SURGERY (CARDIOTHORACIC VASCULAR SURGERY)

## 2019-06-24 PROCEDURE — 93318 ECHO TRANSESOPHAGEAL INTRAOP: CPT | Performed by: ANESTHESIOLOGY

## 2019-06-24 PROCEDURE — 25010000002 HEPARIN (PORCINE) PER 1000 UNITS: Performed by: THORACIC SURGERY (CARDIOTHORACIC VASCULAR SURGERY)

## 2019-06-24 PROCEDURE — 33533 CABG ARTERIAL SINGLE: CPT | Performed by: THORACIC SURGERY (CARDIOTHORACIC VASCULAR SURGERY)

## 2019-06-24 PROCEDURE — A4648 IMPLANTABLE TISSUE MARKER: HCPCS | Performed by: THORACIC SURGERY (CARDIOTHORACIC VASCULAR SURGERY)

## 2019-06-24 PROCEDURE — 84132 ASSAY OF SERUM POTASSIUM: CPT

## 2019-06-24 PROCEDURE — 80069 RENAL FUNCTION PANEL: CPT | Performed by: NURSE PRACTITIONER

## 2019-06-24 PROCEDURE — 71045 X-RAY EXAM CHEST 1 VIEW: CPT

## 2019-06-24 PROCEDURE — 25010000002 ALBUMIN HUMAN 5% PER 50 ML: Performed by: NURSE PRACTITIONER

## 2019-06-24 PROCEDURE — 82962 GLUCOSE BLOOD TEST: CPT

## 2019-06-24 PROCEDURE — 25010000002 MORPHINE PER 10 MG: Performed by: NURSE PRACTITIONER

## 2019-06-24 PROCEDURE — 80051 ELECTROLYTE PANEL: CPT

## 2019-06-24 PROCEDURE — 81001 URINALYSIS AUTO W/SCOPE: CPT | Performed by: THORACIC SURGERY (CARDIOTHORACIC VASCULAR SURGERY)

## 2019-06-24 PROCEDURE — 25010000003 CEFAZOLIN PER 500 MG: Performed by: ANESTHESIOLOGY

## 2019-06-24 PROCEDURE — C1729 CATH, DRAINAGE: HCPCS | Performed by: THORACIC SURGERY (CARDIOTHORACIC VASCULAR SURGERY)

## 2019-06-24 PROCEDURE — C1713 ANCHOR/SCREW BN/BN,TIS/BN: HCPCS | Performed by: THORACIC SURGERY (CARDIOTHORACIC VASCULAR SURGERY)

## 2019-06-24 PROCEDURE — 93005 ELECTROCARDIOGRAM TRACING: CPT | Performed by: NURSE PRACTITIONER

## 2019-06-24 PROCEDURE — 25010000002 MAGNESIUM SULFATE 2 GM/50ML SOLUTION: Performed by: ANESTHESIOLOGY

## 2019-06-24 PROCEDURE — 85018 HEMOGLOBIN: CPT

## 2019-06-24 PROCEDURE — 94799 UNLISTED PULMONARY SVC/PX: CPT

## 2019-06-24 PROCEDURE — 85384 FIBRINOGEN ACTIVITY: CPT | Performed by: NURSE PRACTITIONER

## 2019-06-24 PROCEDURE — 85347 COAGULATION TIME ACTIVATED: CPT

## 2019-06-24 PROCEDURE — 25010000002 MIDAZOLAM PER 1 MG: Performed by: ANESTHESIOLOGY

## 2019-06-24 PROCEDURE — 82947 ASSAY GLUCOSE BLOOD QUANT: CPT

## 2019-06-24 PROCEDURE — 85025 COMPLETE CBC W/AUTO DIFF WBC: CPT | Performed by: NURSE PRACTITIONER

## 2019-06-24 PROCEDURE — P9041 ALBUMIN (HUMAN),5%, 50ML: HCPCS | Performed by: NURSE PRACTITIONER

## 2019-06-24 PROCEDURE — 93010 ELECTROCARDIOGRAM REPORT: CPT | Performed by: INTERNAL MEDICINE

## 2019-06-24 PROCEDURE — C1751 CATH, INF, PER/CENT/MIDLINE: HCPCS | Performed by: ANESTHESIOLOGY

## 2019-06-24 PROCEDURE — 33521 CABG ARTERY-VEIN FOUR: CPT | Performed by: THORACIC SURGERY (CARDIOTHORACIC VASCULAR SURGERY)

## 2019-06-24 PROCEDURE — 85014 HEMATOCRIT: CPT

## 2019-06-24 PROCEDURE — B24BZZ4 ULTRASONOGRAPHY OF HEART WITH AORTA, TRANSESOPHAGEAL: ICD-10-PCS | Performed by: THORACIC SURGERY (CARDIOTHORACIC VASCULAR SURGERY)

## 2019-06-24 PROCEDURE — 85007 BL SMEAR W/DIFF WBC COUNT: CPT | Performed by: NURSE PRACTITIONER

## 2019-06-24 PROCEDURE — P9041 ALBUMIN (HUMAN),5%, 50ML: HCPCS | Performed by: ANESTHESIOLOGY

## 2019-06-24 PROCEDURE — 80048 BASIC METABOLIC PNL TOTAL CA: CPT | Performed by: NURSE PRACTITIONER

## 2019-06-24 PROCEDURE — 99232 SBSQ HOSP IP/OBS MODERATE 35: CPT | Performed by: INTERNAL MEDICINE

## 2019-06-24 PROCEDURE — 84295 ASSAY OF SERUM SODIUM: CPT

## 2019-06-24 PROCEDURE — 06BP4ZZ EXCISION OF RIGHT SAPHENOUS VEIN, PERCUTANEOUS ENDOSCOPIC APPROACH: ICD-10-PCS | Performed by: THORACIC SURGERY (CARDIOTHORACIC VASCULAR SURGERY)

## 2019-06-24 PROCEDURE — 25010000002 HEPARIN (PORCINE) PER 1000 UNITS: Performed by: ANESTHESIOLOGY

## 2019-06-24 PROCEDURE — 25010000002 FENTANYL CITRATE (PF) 100 MCG/2ML SOLUTION: Performed by: ANESTHESIOLOGY

## 2019-06-24 PROCEDURE — 33533 CABG ARTERIAL SINGLE: CPT

## 2019-06-24 PROCEDURE — 85610 PROTHROMBIN TIME: CPT | Performed by: NURSE PRACTITIONER

## 2019-06-24 PROCEDURE — 82803 BLOOD GASES ANY COMBINATION: CPT

## 2019-06-24 PROCEDURE — 33508 ENDOSCOPIC VEIN HARVEST: CPT

## 2019-06-24 PROCEDURE — 85730 THROMBOPLASTIN TIME PARTIAL: CPT | Performed by: NURSE PRACTITIONER

## 2019-06-24 PROCEDURE — 87081 CULTURE SCREEN ONLY: CPT | Performed by: THORACIC SURGERY (CARDIOTHORACIC VASCULAR SURGERY)

## 2019-06-24 PROCEDURE — 33508 ENDOSCOPIC VEIN HARVEST: CPT | Performed by: THORACIC SURGERY (CARDIOTHORACIC VASCULAR SURGERY)

## 2019-06-24 PROCEDURE — 25010000002 PAPAVERINE PER 60 MG: Performed by: THORACIC SURGERY (CARDIOTHORACIC VASCULAR SURGERY)

## 2019-06-24 PROCEDURE — 63710000001 INSULIN REGULAR HUMAN PER 5 UNITS: Performed by: NURSE PRACTITIONER

## 2019-06-24 PROCEDURE — 82330 ASSAY OF CALCIUM: CPT

## 2019-06-24 PROCEDURE — 25010000003 POTASSIUM CHLORIDE 10 MEQ/100ML SOLUTION

## 2019-06-24 PROCEDURE — 94002 VENT MGMT INPAT INIT DAY: CPT

## 2019-06-24 PROCEDURE — 84100 ASSAY OF PHOSPHORUS: CPT | Performed by: NURSE PRACTITIONER

## 2019-06-24 PROCEDURE — 25010000002 PROTAMINE SULFATE PER 10 MG: Performed by: ANESTHESIOLOGY

## 2019-06-24 PROCEDURE — 85025 COMPLETE CBC W/AUTO DIFF WBC: CPT | Performed by: INTERNAL MEDICINE

## 2019-06-24 PROCEDURE — 63710000001 INSULIN REGULAR HUMAN PER 5 UNITS: Performed by: ANESTHESIOLOGY

## 2019-06-24 PROCEDURE — 33521 CABG ARTERY-VEIN FOUR: CPT

## 2019-06-24 PROCEDURE — 021309W BYPASS CORONARY ARTERY, FOUR OR MORE ARTERIES FROM AORTA WITH AUTOLOGOUS VENOUS TISSUE, OPEN APPROACH: ICD-10-PCS | Performed by: THORACIC SURGERY (CARDIOTHORACIC VASCULAR SURGERY)

## 2019-06-24 PROCEDURE — 80048 BASIC METABOLIC PNL TOTAL CA: CPT | Performed by: INTERNAL MEDICINE

## 2019-06-24 PROCEDURE — 02100Z9 BYPASS CORONARY ARTERY, ONE ARTERY FROM LEFT INTERNAL MAMMARY, OPEN APPROACH: ICD-10-PCS | Performed by: THORACIC SURGERY (CARDIOTHORACIC VASCULAR SURGERY)

## 2019-06-24 PROCEDURE — 5A1221Z PERFORMANCE OF CARDIAC OUTPUT, CONTINUOUS: ICD-10-PCS | Performed by: THORACIC SURGERY (CARDIOTHORACIC VASCULAR SURGERY)

## 2019-06-24 PROCEDURE — 25010000002 PROPOFOL 200 MG/20ML EMULSION: Performed by: ANESTHESIOLOGY

## 2019-06-24 PROCEDURE — 25010000002 ALBUMIN HUMAN 5% PER 50 ML: Performed by: ANESTHESIOLOGY

## 2019-06-24 PROCEDURE — 82330 ASSAY OF CALCIUM: CPT | Performed by: NURSE PRACTITIONER

## 2019-06-24 PROCEDURE — 83735 ASSAY OF MAGNESIUM: CPT | Performed by: NURSE PRACTITIONER

## 2019-06-24 DEVICE — CLIP LIGAT VASC HORIZON TI SM/WD RED 24CT: Type: IMPLANTABLE DEVICE | Status: FUNCTIONAL

## 2019-06-24 DEVICE — CLIP LIGAT VASC HORIZON TI LG ORNG 6CT: Type: IMPLANTABLE DEVICE | Status: FUNCTIONAL

## 2019-06-24 DEVICE — SS SUTURE, 6 PER SLEEVE
Type: IMPLANTABLE DEVICE | Status: FUNCTIONAL
Brand: MYO/WIRE II

## 2019-06-24 DEVICE — SS SUTURE, 3 PER SLEEVE
Type: IMPLANTABLE DEVICE | Status: FUNCTIONAL
Brand: MYO/WIRE II

## 2019-06-24 DEVICE — WAX,BONE,NATURAL
Type: IMPLANTABLE DEVICE | Status: FUNCTIONAL
Brand: MEDLINE INDUSTRIES

## 2019-06-24 RX ORDER — AMINOCAPROIC ACID 250 MG/ML
INJECTION, SOLUTION INTRAVENOUS AS NEEDED
Status: DISCONTINUED | OUTPATIENT
Start: 2019-06-24 | End: 2019-06-24 | Stop reason: SURG

## 2019-06-24 RX ORDER — CEFAZOLIN SODIUM 1 G/3ML
INJECTION, POWDER, FOR SOLUTION INTRAMUSCULAR; INTRAVENOUS AS NEEDED
Status: DISCONTINUED | OUTPATIENT
Start: 2019-06-24 | End: 2019-06-24 | Stop reason: SURG

## 2019-06-24 RX ORDER — DEXMEDETOMIDINE HYDROCHLORIDE 4 UG/ML
.2-.7 INJECTION, SOLUTION INTRAVENOUS
Status: DISCONTINUED | OUTPATIENT
Start: 2019-06-24 | End: 2019-06-25

## 2019-06-24 RX ORDER — MIDAZOLAM HYDROCHLORIDE 1 MG/ML
INJECTION INTRAMUSCULAR; INTRAVENOUS AS NEEDED
Status: DISCONTINUED | OUTPATIENT
Start: 2019-06-24 | End: 2019-06-24 | Stop reason: SURG

## 2019-06-24 RX ORDER — ACETAMINOPHEN 650 MG/1
650 SUPPOSITORY RECTAL EVERY 4 HOURS PRN
Status: DISCONTINUED | OUTPATIENT
Start: 2019-06-24 | End: 2019-06-24

## 2019-06-24 RX ORDER — CHLORHEXIDINE GLUCONATE 0.12 MG/ML
15 RINSE ORAL EVERY 12 HOURS
Status: DISCONTINUED | OUTPATIENT
Start: 2019-06-25 | End: 2019-06-29

## 2019-06-24 RX ORDER — POTASSIUM CHLORIDE 20 MEQ/1
40 TABLET, EXTENDED RELEASE ORAL AS NEEDED
Status: DISCONTINUED | OUTPATIENT
Start: 2019-06-24 | End: 2019-06-30 | Stop reason: HOSPADM

## 2019-06-24 RX ORDER — SODIUM CHLORIDE 0.9 % (FLUSH) 0.9 %
3-10 SYRINGE (ML) INJECTION AS NEEDED
Status: DISCONTINUED | OUTPATIENT
Start: 2019-06-24 | End: 2019-06-24 | Stop reason: HOSPADM

## 2019-06-24 RX ORDER — MAGNESIUM SULFATE HEPTAHYDRATE 40 MG/ML
INJECTION, SOLUTION INTRAVENOUS AS NEEDED
Status: DISCONTINUED | OUTPATIENT
Start: 2019-06-24 | End: 2019-06-24 | Stop reason: SURG

## 2019-06-24 RX ORDER — SENNA PLUS 8.6 MG/1
2 TABLET ORAL NIGHTLY
Status: DISCONTINUED | OUTPATIENT
Start: 2019-06-25 | End: 2019-06-30 | Stop reason: HOSPADM

## 2019-06-24 RX ORDER — ASPIRIN 325 MG
325 TABLET ORAL ONCE
Status: DISCONTINUED | OUTPATIENT
Start: 2019-06-24 | End: 2019-06-25

## 2019-06-24 RX ORDER — SODIUM CHLORIDE, SODIUM LACTATE, POTASSIUM CHLORIDE, CALCIUM CHLORIDE 600; 310; 30; 20 MG/100ML; MG/100ML; MG/100ML; MG/100ML
INJECTION, SOLUTION INTRAVENOUS CONTINUOUS PRN
Status: DISCONTINUED | OUTPATIENT
Start: 2019-06-24 | End: 2019-06-24 | Stop reason: SURG

## 2019-06-24 RX ORDER — ASPIRIN 325 MG
325 TABLET, DELAYED RELEASE (ENTERIC COATED) ORAL DAILY
Status: DISCONTINUED | OUTPATIENT
Start: 2019-06-25 | End: 2019-06-25

## 2019-06-24 RX ORDER — VECURONIUM BROMIDE 1 MG/ML
INJECTION, POWDER, LYOPHILIZED, FOR SOLUTION INTRAVENOUS AS NEEDED
Status: DISCONTINUED | OUTPATIENT
Start: 2019-06-24 | End: 2019-06-24 | Stop reason: SURG

## 2019-06-24 RX ORDER — DOCUSATE SODIUM 100 MG/1
100 CAPSULE, LIQUID FILLED ORAL 2 TIMES DAILY
Status: DISCONTINUED | OUTPATIENT
Start: 2019-06-25 | End: 2019-06-30 | Stop reason: HOSPADM

## 2019-06-24 RX ORDER — ONDANSETRON 2 MG/ML
4 INJECTION INTRAMUSCULAR; INTRAVENOUS EVERY 6 HOURS PRN
Status: DISCONTINUED | OUTPATIENT
Start: 2019-06-24 | End: 2019-06-30 | Stop reason: HOSPADM

## 2019-06-24 RX ORDER — POTASSIUM CHLORIDE 1.5 G/1.77G
40 POWDER, FOR SOLUTION ORAL AS NEEDED
Status: DISCONTINUED | OUTPATIENT
Start: 2019-06-24 | End: 2019-06-30 | Stop reason: HOSPADM

## 2019-06-24 RX ORDER — MEPERIDINE HYDROCHLORIDE 25 MG/ML
25 INJECTION INTRAMUSCULAR; INTRAVENOUS; SUBCUTANEOUS EVERY 4 HOURS PRN
Status: DISCONTINUED | OUTPATIENT
Start: 2019-06-24 | End: 2019-06-25

## 2019-06-24 RX ORDER — ALBUMIN, HUMAN INJ 5% 5 %
25 SOLUTION INTRAVENOUS AS NEEDED
Status: DISCONTINUED | OUTPATIENT
Start: 2019-06-24 | End: 2019-06-25

## 2019-06-24 RX ORDER — MAGNESIUM SULFATE 1 G/100ML
1 INJECTION INTRAVENOUS EVERY 8 HOURS
Status: COMPLETED | OUTPATIENT
Start: 2019-06-24 | End: 2019-06-25

## 2019-06-24 RX ORDER — CHLORHEXIDINE GLUCONATE 0.12 MG/ML
15 RINSE ORAL EVERY 12 HOURS SCHEDULED
Status: DISCONTINUED | OUTPATIENT
Start: 2019-06-24 | End: 2019-06-24

## 2019-06-24 RX ORDER — ALBUMIN, HUMAN INJ 5% 5 %
SOLUTION INTRAVENOUS
Status: COMPLETED
Start: 2019-06-24 | End: 2019-06-24

## 2019-06-24 RX ORDER — HEPARIN SODIUM 1000 [USP'U]/ML
INJECTION, SOLUTION INTRAVENOUS; SUBCUTANEOUS AS NEEDED
Status: DISCONTINUED | OUTPATIENT
Start: 2019-06-24 | End: 2019-06-24 | Stop reason: SURG

## 2019-06-24 RX ORDER — CHLORHEXIDINE GLUCONATE 0.12 MG/ML
15 RINSE ORAL ONCE
Status: DISCONTINUED | OUTPATIENT
Start: 2019-06-24 | End: 2019-06-24

## 2019-06-24 RX ORDER — POTASSIUM CHLORIDE 7.45 MG/ML
INJECTION INTRAVENOUS
Status: COMPLETED
Start: 2019-06-24 | End: 2019-06-24

## 2019-06-24 RX ORDER — ACETAMINOPHEN 650 MG/1
650 SUPPOSITORY RECTAL EVERY 6 HOURS SCHEDULED
Status: DISCONTINUED | OUTPATIENT
Start: 2019-06-24 | End: 2019-06-25

## 2019-06-24 RX ORDER — FENTANYL CITRATE 50 UG/ML
INJECTION, SOLUTION INTRAMUSCULAR; INTRAVENOUS AS NEEDED
Status: DISCONTINUED | OUTPATIENT
Start: 2019-06-24 | End: 2019-06-24 | Stop reason: SURG

## 2019-06-24 RX ORDER — SODIUM CHLORIDE 0.9 % (FLUSH) 0.9 %
3 SYRINGE (ML) INJECTION EVERY 12 HOURS SCHEDULED
Status: DISCONTINUED | OUTPATIENT
Start: 2019-06-24 | End: 2019-06-24 | Stop reason: HOSPADM

## 2019-06-24 RX ORDER — HYDROCODONE BITARTRATE AND ACETAMINOPHEN 5; 325 MG/1; MG/1
2 TABLET ORAL EVERY 6 HOURS PRN
Status: DISCONTINUED | OUTPATIENT
Start: 2019-06-24 | End: 2019-06-25

## 2019-06-24 RX ORDER — ACETAMINOPHEN 325 MG/1
650 TABLET ORAL EVERY 4 HOURS PRN
Status: DISCONTINUED | OUTPATIENT
Start: 2019-06-24 | End: 2019-06-24

## 2019-06-24 RX ORDER — MAGNESIUM SULFATE HEPTAHYDRATE 40 MG/ML
4 INJECTION, SOLUTION INTRAVENOUS AS NEEDED
Status: DISCONTINUED | OUTPATIENT
Start: 2019-06-24 | End: 2019-06-30 | Stop reason: HOSPADM

## 2019-06-24 RX ORDER — POTASSIUM CHLORIDE 7.45 MG/ML
10 INJECTION INTRAVENOUS
Status: DISCONTINUED | OUTPATIENT
Start: 2019-06-24 | End: 2019-06-30 | Stop reason: HOSPADM

## 2019-06-24 RX ORDER — CHLORHEXIDINE GLUCONATE 500 MG/1
1 CLOTH TOPICAL EVERY 12 HOURS PRN
Status: DISCONTINUED | OUTPATIENT
Start: 2019-06-24 | End: 2019-06-24 | Stop reason: HOSPADM

## 2019-06-24 RX ORDER — PROPOFOL 10 MG/ML
INJECTION, EMULSION INTRAVENOUS AS NEEDED
Status: DISCONTINUED | OUTPATIENT
Start: 2019-06-24 | End: 2019-06-24 | Stop reason: SURG

## 2019-06-24 RX ORDER — DEXMEDETOMIDINE HYDROCHLORIDE 4 UG/ML
.2-1.5 INJECTION, SOLUTION INTRAVENOUS
Status: DISCONTINUED | OUTPATIENT
Start: 2019-06-24 | End: 2019-06-24 | Stop reason: HOSPADM

## 2019-06-24 RX ORDER — CALCIUM CHLORIDE 100 MG/ML
INJECTION INTRAVENOUS; INTRAVENTRICULAR AS NEEDED
Status: DISCONTINUED | OUTPATIENT
Start: 2019-06-24 | End: 2019-06-24 | Stop reason: SURG

## 2019-06-24 RX ORDER — PROTAMINE SULFATE 10 MG/ML
INJECTION, SOLUTION INTRAVENOUS AS NEEDED
Status: DISCONTINUED | OUTPATIENT
Start: 2019-06-24 | End: 2019-06-24 | Stop reason: SURG

## 2019-06-24 RX ORDER — ATORVASTATIN CALCIUM 40 MG/1
40 TABLET, FILM COATED ORAL NIGHTLY
Status: DISCONTINUED | OUTPATIENT
Start: 2019-06-25 | End: 2019-06-30 | Stop reason: HOSPADM

## 2019-06-24 RX ORDER — ACETAMINOPHEN 650 MG/1
650 SUPPOSITORY RECTAL EVERY 4 HOURS PRN
Status: DISCONTINUED | OUTPATIENT
Start: 2019-06-24 | End: 2019-06-25

## 2019-06-24 RX ORDER — MORPHINE SULFATE 4 MG/ML
2 INJECTION, SOLUTION INTRAMUSCULAR; INTRAVENOUS
Status: DISCONTINUED | OUTPATIENT
Start: 2019-06-24 | End: 2019-06-27

## 2019-06-24 RX ORDER — MAGNESIUM SULFATE HEPTAHYDRATE 40 MG/ML
2 INJECTION, SOLUTION INTRAVENOUS AS NEEDED
Status: DISCONTINUED | OUTPATIENT
Start: 2019-06-24 | End: 2019-06-30 | Stop reason: HOSPADM

## 2019-06-24 RX ORDER — XYLITOL/YERBA SANTA
2 AEROSOL, SPRAY WITH PUMP (ML) MUCOUS MEMBRANE
Status: DISCONTINUED | OUTPATIENT
Start: 2019-06-24 | End: 2019-06-25

## 2019-06-24 RX ORDER — POLYETHYLENE GLYCOL 3350 17 G/17G
17 POWDER, FOR SOLUTION ORAL 2 TIMES DAILY
Status: DISCONTINUED | OUTPATIENT
Start: 2019-06-25 | End: 2019-06-30 | Stop reason: HOSPADM

## 2019-06-24 RX ORDER — ALBUMIN, HUMAN INJ 5% 5 %
SOLUTION INTRAVENOUS CONTINUOUS PRN
Status: DISCONTINUED | OUTPATIENT
Start: 2019-06-24 | End: 2019-06-24 | Stop reason: SURG

## 2019-06-24 RX ADMIN — SODIUM CHLORIDE, SODIUM LACTATE, POTASSIUM CHLORIDE, AND CALCIUM CHLORIDE: .6; .31; .03; .02 INJECTION, SOLUTION INTRAVENOUS at 12:12

## 2019-06-24 RX ADMIN — MORPHINE SULFATE 2 MG: 4 INJECTION INTRAVENOUS at 17:30

## 2019-06-24 RX ADMIN — HYDRALAZINE HYDROCHLORIDE 20 MG: 20 INJECTION INTRAMUSCULAR; INTRAVENOUS at 10:26

## 2019-06-24 RX ADMIN — CHLORHEXIDINE GLUCONATE 15 ML: 1.2 RINSE ORAL at 06:39

## 2019-06-24 RX ADMIN — SODIUM CHLORIDE 0.9 UNITS/HR: 900 INJECTION, SOLUTION INTRAVENOUS at 20:24

## 2019-06-24 RX ADMIN — AMINOCAPROIC ACID 10 G: 250 INJECTION, SOLUTION INTRAVENOUS at 15:28

## 2019-06-24 RX ADMIN — ACETAMINOPHEN 650 MG: 325 TABLET, FILM COATED ORAL at 07:28

## 2019-06-24 RX ADMIN — SODIUM CHLORIDE 0.3 MCG/KG/HR: 900 INJECTION, SOLUTION INTRAVENOUS at 20:25

## 2019-06-24 RX ADMIN — CALCIUM CHLORIDE 500 MG: 100 INJECTION INTRAVENOUS; INTRAVENTRICULAR at 15:10

## 2019-06-24 RX ADMIN — PROPOFOL 100 MG: 10 INJECTION, EMULSION INTRAVENOUS at 12:22

## 2019-06-24 RX ADMIN — Medication 3 ML: at 09:48

## 2019-06-24 RX ADMIN — MIDAZOLAM 4 MG: 1 INJECTION INTRAMUSCULAR; INTRAVENOUS at 12:12

## 2019-06-24 RX ADMIN — VECURONIUM BROMIDE 10 MG: 1 INJECTION, POWDER, LYOPHILIZED, FOR SOLUTION INTRAVENOUS at 13:14

## 2019-06-24 RX ADMIN — SODIUM CHLORIDE 0.5 MCG/KG/HR: 900 INJECTION INTRAVENOUS at 12:55

## 2019-06-24 RX ADMIN — CEFAZOLIN SODIUM 2 G: 1 INJECTION, POWDER, FOR SOLUTION INTRAMUSCULAR; INTRAVENOUS at 15:15

## 2019-06-24 RX ADMIN — FENTANYL CITRATE 400 MCG: 50 INJECTION, SOLUTION INTRAMUSCULAR; INTRAVENOUS at 12:22

## 2019-06-24 RX ADMIN — MORPHINE SULFATE 2 MG: 4 INJECTION INTRAVENOUS at 23:53

## 2019-06-24 RX ADMIN — HEPARIN SODIUM 25000 UNITS: 1000 INJECTION, SOLUTION INTRAVENOUS; SUBCUTANEOUS at 13:32

## 2019-06-24 RX ADMIN — MAGNESIUM SULFATE HEPTAHYDRATE 2 G: 40 INJECTION, SOLUTION INTRAVENOUS at 15:05

## 2019-06-24 RX ADMIN — ALBUMIN HUMAN 12.5 G: 0.05 INJECTION, SOLUTION INTRAVENOUS at 18:06

## 2019-06-24 RX ADMIN — MIDAZOLAM 4 MG: 1 INJECTION INTRAMUSCULAR; INTRAVENOUS at 13:50

## 2019-06-24 RX ADMIN — MUPIROCIN 1 APPLICATION: 20 OINTMENT TOPICAL at 22:23

## 2019-06-24 RX ADMIN — MUPIROCIN 1 APPLICATION: 20 OINTMENT TOPICAL at 08:00

## 2019-06-24 RX ADMIN — MIDAZOLAM 2 MG: 1 INJECTION INTRAMUSCULAR; INTRAVENOUS at 15:27

## 2019-06-24 RX ADMIN — MORPHINE SULFATE 2 MG: 4 INJECTION INTRAVENOUS at 18:23

## 2019-06-24 RX ADMIN — FENTANYL CITRATE 250 MCG: 50 INJECTION, SOLUTION INTRAMUSCULAR; INTRAVENOUS at 15:26

## 2019-06-24 RX ADMIN — VECURONIUM BROMIDE 10 MG: 1 INJECTION, POWDER, LYOPHILIZED, FOR SOLUTION INTRAVENOUS at 12:22

## 2019-06-24 RX ADMIN — FENTANYL CITRATE 500 MCG: 50 INJECTION, SOLUTION INTRAMUSCULAR; INTRAVENOUS at 13:01

## 2019-06-24 RX ADMIN — ACETAMINOPHEN 650 MG: 325 TABLET, FILM COATED ORAL at 01:33

## 2019-06-24 RX ADMIN — ATENOLOL 50 MG: 50 TABLET ORAL at 11:56

## 2019-06-24 RX ADMIN — CEFAZOLIN SODIUM 2 G: 1 INJECTION, POWDER, FOR SOLUTION INTRAMUSCULAR; INTRAVENOUS at 12:42

## 2019-06-24 RX ADMIN — SODIUM CHLORIDE 4 UNITS/HR: 900 INJECTION INTRAVENOUS at 13:50

## 2019-06-24 RX ADMIN — ALBUMIN HUMAN: 0.05 INJECTION, SOLUTION INTRAVENOUS at 15:20

## 2019-06-24 RX ADMIN — MIDAZOLAM 2 MG: 1 INJECTION INTRAMUSCULAR; INTRAVENOUS at 15:13

## 2019-06-24 RX ADMIN — ALBUMIN HUMAN 12.5 G: 0.05 INJECTION, SOLUTION INTRAVENOUS at 18:40

## 2019-06-24 RX ADMIN — FENTANYL CITRATE 100 MCG: 50 INJECTION, SOLUTION INTRAMUSCULAR; INTRAVENOUS at 12:12

## 2019-06-24 RX ADMIN — VECURONIUM BROMIDE 5 MG: 1 INJECTION, POWDER, LYOPHILIZED, FOR SOLUTION INTRAVENOUS at 14:56

## 2019-06-24 RX ADMIN — VECURONIUM BROMIDE 5 MG: 1 INJECTION, POWDER, LYOPHILIZED, FOR SOLUTION INTRAVENOUS at 13:50

## 2019-06-24 RX ADMIN — AMINOCAPROIC ACID 10 G: 250 INJECTION, SOLUTION INTRAVENOUS at 12:50

## 2019-06-24 RX ADMIN — FENTANYL CITRATE 250 MCG: 50 INJECTION, SOLUTION INTRAMUSCULAR; INTRAVENOUS at 15:11

## 2019-06-24 RX ADMIN — POTASSIUM CHLORIDE 10 MEQ: 7.46 INJECTION, SOLUTION INTRAVENOUS at 17:36

## 2019-06-24 RX ADMIN — POTASSIUM CHLORIDE 10 MEQ: 7.45 INJECTION INTRAVENOUS at 17:36

## 2019-06-24 RX ADMIN — ALBUMIN HUMAN 25 G: 0.05 INJECTION, SOLUTION INTRAVENOUS at 23:55

## 2019-06-24 RX ADMIN — PROTAMINE SULFATE 300 MG: 10 INJECTION, SOLUTION INTRAVENOUS at 15:10

## 2019-06-24 RX ADMIN — ALBUMIN HUMAN 25 G: 0.05 INJECTION, SOLUTION INTRAVENOUS at 17:35

## 2019-06-24 RX ADMIN — MORPHINE SULFATE 2 MG: 4 INJECTION INTRAVENOUS at 20:05

## 2019-06-24 NOTE — ANESTHESIA PROCEDURE NOTES
Arterial Line      Patient location during procedure: OR  Start time: 6/24/2019 12:14 PM  Stop Time:6/24/2019 12:20 PM       Line placed for hemodynamic monitoring and ABGs/Labs/ISTAT.  Performed By   Anesthesiologist: Natanael Bernabe MD  Preanesthetic Checklist  Completed: patient identified, site marked, surgical consent, pre-op evaluation, timeout performed, IV checked, risks and benefits discussed and monitors and equipment checked  Arterial Line Prep   Sterile Tech: cap, gloves, mask and sterile barriers  Prep: alcohol swabs  Patient monitoring: blood pressure monitoring, continuous pulse oximetry and EKG  Arterial Line Procedure   Laterality:left  Location:  radial artery  Catheter size: 20 G   Guidance: palpation technique  Number of attempts: 1  Successful placement: yes          Post Assessment   Dressing Type: occlusive dressing applied and secured with tape.   Complications no  Circ/Move/Sens Assessment: normal and unchanged.   Patient Tolerance: patient tolerated the procedure well with no apparent complications  Additional Notes  STANLEY HULL

## 2019-06-24 NOTE — ANESTHESIA PREPROCEDURE EVALUATION
Anesthesia Evaluation     NPO Solid Status: > 8 hours  NPO Liquid Status: > 8 hours           Airway   Mallampati: I  TM distance: >3 FB  Neck ROM: full  No difficulty expected  Dental - normal exam     Pulmonary - normal exam   (+) shortness of breath,   Cardiovascular - normal exam    (+) hypertension well controlled, angina at rest, PVD, DVT resolved,       Neuro/Psych  (+) dizziness/light headedness,     GI/Hepatic/Renal/Endo    (+)   hepatitis, liver disease history of elevated LFT,     Musculoskeletal     Abdominal  - normal exam    Bowel sounds: normal.   Substance History      OB/GYN          Other   (+) arthritis                     Anesthesia Plan    ASA 4     general   (MALIK, POSTOPVENT, TRANSFUSION, LINES DISCUSSED)  intravenous induction   Anesthetic plan, all risks, benefits, and alternatives have been provided, discussed and informed consent has been obtained with: patient.

## 2019-06-24 NOTE — ANESTHESIA PROCEDURE NOTES
Procedure Performed: Emergent/Open-Heart Anesthesia MALIK      Start Time:  6/24/2019 1:22 PM       End Time:      Preanesthesia Checklist:  Patient identified, IV assessed, risks and benefits discussed, monitors and equipment assessed, procedure being performed at surgeon's request and anesthesia consent obtained.    General Procedure Information  MALIK Placed for monitoring purposes only -- This is not a diagnostic MALIK  Physician Requesting Echo: Shady Steele MD  Location performed:  OR  Intubated  Bite block placed  Heart visualized  Probe Insertion:  Easy  Probe Type:  Multiplane  Modalities:  Color flow mapping, continuous wave Doppler and pulse wave Doppler    Echocardiographic and Doppler Measurements    Ventricles    Right Ventricle:  Cavity size normal.  Hypertrophy not present.  Thrombus not present.  Global function normal.    Left Ventricle:  Cavity size normal.  Hypertrophy present.  Thrombus not present.  Global Function normal.          Valves    Aortic Valve:  Annulus normal.  Stenosis not present.  Regurgitation absent.  Leaflets normal.  Leaflet motions normal.      Mitral Valve:  Annulus normal.  Stenosis not present.  Regurgitation trace.  Leaflets normal.  Leaflet motions normal.      Tricuspid Valve:  Annulus normal.  Stenosis not present.  Regurgitation trace.  Leaflets normal.  Leaflet motions normal.          Aorta    Ascending Aorta:  Size normal.  Dissection not present.  Plaque thickness less than 3 mm.  Mobile plaque not present.    Aortic Arch:  Size normal.  Dissection not present.  Plaque thickness less than 3 mm.  Mobile plaque not present.    Descending Aorta:  Size normal.  Dissection not present.  Plaque thickness less than 3 mm.  Mobile plaque not present.          Atria    Right Atrium:  Size normal.  Spontaneous echo contrast not present.  Thrombus not present.  Tumor not present.    Left Atrium:  Size normal.  Spontaneous echo contrast not present.  Thrombus not present.   Tumor not present.      Septa    Atrial Septum:  Intra-atrial septal morphology normal.      Ventricular Septum:  Intra-ventricular septum morphology normal.              Anesthesia Information  Performed Personally  Anesthesiologist:  Natanael Bernabe MD

## 2019-06-24 NOTE — ANESTHESIA PROCEDURE NOTES
Airway  Urgency: elective    Date/Time: 6/24/2019 12:25 PM  End Time:6/24/2019 12:25 PM  Airway not difficult    General Information and Staff    Patient location during procedure: OR  Anesthesiologist: Natanael Bernabe MD    Indications and Patient Condition  Indications for airway management: airway protection    Preoxygenated: yes  MILS maintained throughout  Mask difficulty assessment: 1 - vent by mask    Final Airway Details  Final airway type: endotracheal airway      Successful airway: ETT  Cuffed: yes   Successful intubation technique: direct laryngoscopy  Endotracheal tube insertion site: oral  Blade: Dexter  Blade size: 3  ETT size (mm): 8.0  Cormack-Lehane Classification: grade I - full view of glottis  Placement verified by: chest auscultation and capnometry   Inital cuff pressure (cm H2O): 20  Measured from: gums  ETT to gums (cm): 24  Number of attempts at approach: 1

## 2019-06-24 NOTE — ANESTHESIA PROCEDURE NOTES
Central Line      Patient location during procedure: OR  Start time: 6/24/2019 12:27 PM  Stop Time:6/24/2019 12:33 PM  Indications: central pressure monitoring and vascular access  Staff  Anesthesiologist: Natanael Bernabe MD  Preanesthetic Checklist  Completed: patient identified, site marked, surgical consent, pre-op evaluation, timeout performed, IV checked, risks and benefits discussed and monitors and equipment checked  Central Line Prep  Sterile Tech:cap, gloves, gown, mask and sterile barriers  Prep: chloraprep  Patient monitoring: blood pressure monitoring, continuous pulse oximetry and EKG  Central Line Procedure  Laterality:right  Location:internal jugular  Catheter Type:Cordis  Catheter Size:9 Fr  Guidance:ultrasound guided  PROCEDURE NOTE/ULTRASOUND INTERPRETATION.  Using ultrasound guidance the potential vascular sites for insertion of the catheter were visualized to determine the patency of the vessel to be used for vascular access.  After selecting the appropriate site for insertion, the needle was visualized under ultrasound being inserted into the internal jugular vein, followed by ultrasound confirmation of wire and catheter placement. There were no abnormalities seen on ultrasound; an image was taken; and the patient tolerated the procedure with no complications. Images: still images obtained, printed/placed on chart  Assessment  Post procedure:biopatch applied, line sutured and occlusive dressing applied  Assessement:blood return through all ports, free fluid flow and Luis Test  Complications:no  Patient Tolerance:patient tolerated the procedure well with no apparent complications

## 2019-06-24 NOTE — ANESTHESIA POSTPROCEDURE EVALUATION
Patient: Chris Chua    Procedure Summary     Date:  06/24/19 Room / Location:   [facility]    Anesthesia Start:  1211 Anesthesia Stop:  1615    Procedure:  CABG (N/A Chest) Diagnosis:       Shortness of breath      Unstable angina (CMS/HCC)      (Shortness of breath [R06.02])      (Unstable angina (CMS/HCC) [I20.0])    Surgeon:  Jose Angel López MD Provider:  Natanael Bernabe MD    Anesthesia Type:  general ASA Status:  4          Anesthesia Type: general  Last vitals  BP   158/85 (06/24/19 1156)   Temp   98.6 °F (37 °C) (06/24/19 1100)   Pulse   94 (06/24/19 1156)   Resp   22 (06/24/19 1100)     SpO2   97 % (06/24/19 1100)     Post Anesthesia Care and Evaluation    Patient location during evaluation: ICU  Patient participation: complete - patient cannot participate  Level of consciousness: obtunded/minimal responses  Pain score: 0  Pain management: adequate  Airway patency: patent  Anesthetic complications: No anesthetic complications  PONV Status: none  Cardiovascular status: acceptable  Respiratory status: acceptable  Hydration status: acceptable    Comments: Patient intubated sedated. Stable

## 2019-06-25 ENCOUNTER — APPOINTMENT (OUTPATIENT)
Dept: GENERAL RADIOLOGY | Facility: HOSPITAL | Age: 69
End: 2019-06-25

## 2019-06-25 LAB
ANION GAP SERPL CALCULATED.3IONS-SCNC: 12 MMOL/L (ref 10–20)
BASOPHILS # BLD AUTO: 0 10*3/MM3 (ref 0–0.2)
BASOPHILS NFR BLD AUTO: 0.1 % (ref 0–1.5)
BUN BLD-MCNC: 27 MG/DL (ref 8–20)
BUN/CREAT SERPL: 12.9 (ref 6.2–20.3)
CA-I SERPL ISE-MCNC: 1.16 MMOL/L (ref 1.2–1.3)
CALCIUM SPEC-SCNC: 8.6 MG/DL (ref 8.9–10.3)
CHLORIDE SERPL-SCNC: 108 MMOL/L (ref 101–111)
CO2 SERPL-SCNC: 22 MMOL/L (ref 22–32)
CREAT BLD-MCNC: 2.1 MG/DL (ref 0.7–1.2)
DEPRECATED RDW RBC AUTO: 46.4 FL (ref 37–54)
EOSINOPHIL # BLD AUTO: 0 10*3/MM3 (ref 0–0.4)
EOSINOPHIL NFR BLD AUTO: 0 % (ref 0.3–6.2)
ERYTHROCYTE [DISTWIDTH] IN BLOOD BY AUTOMATED COUNT: 13.9 % (ref 12.3–15.4)
GFR SERPL CREATININE-BSD FRML MDRD: 32 ML/MIN/1.73
GLUCOSE BLD-MCNC: 128 MG/DL (ref 65–99)
GLUCOSE BLDC GLUCOMTR-MCNC: 103 MG/DL (ref 70–105)
GLUCOSE BLDC GLUCOMTR-MCNC: 109 MG/DL (ref 70–105)
GLUCOSE BLDC GLUCOMTR-MCNC: 112 MG/DL (ref 70–105)
GLUCOSE BLDC GLUCOMTR-MCNC: 117 MG/DL (ref 70–105)
GLUCOSE BLDC GLUCOMTR-MCNC: 120 MG/DL (ref 70–105)
GLUCOSE BLDC GLUCOMTR-MCNC: 123 MG/DL (ref 70–105)
GLUCOSE BLDC GLUCOMTR-MCNC: 126 MG/DL (ref 70–105)
GLUCOSE BLDC GLUCOMTR-MCNC: 126 MG/DL (ref 70–105)
GLUCOSE BLDC GLUCOMTR-MCNC: 130 MG/DL (ref 70–105)
GLUCOSE BLDC GLUCOMTR-MCNC: 132 MG/DL (ref 70–105)
GLUCOSE BLDC GLUCOMTR-MCNC: 145 MG/DL (ref 70–105)
GLUCOSE BLDC GLUCOMTR-MCNC: 154 MG/DL (ref 70–105)
GLUCOSE BLDC GLUCOMTR-MCNC: 155 MG/DL (ref 70–105)
GLUCOSE BLDC GLUCOMTR-MCNC: 163 MG/DL (ref 70–105)
GLUCOSE BLDC GLUCOMTR-MCNC: 176 MG/DL (ref 70–105)
HCT VFR BLD AUTO: 29.6 % (ref 37.5–51)
HGB BLD-MCNC: 10.1 G/DL (ref 13–17.7)
INR PPP: 1.21 (ref 0.9–1.1)
LYMPHOCYTES # BLD AUTO: 0.5 10*3/MM3 (ref 0.7–3.1)
LYMPHOCYTES NFR BLD AUTO: 4.1 % (ref 19.6–45.3)
MAGNESIUM SERPL-MCNC: 2.3 MG/DL (ref 1.8–2.5)
MCH RBC QN AUTO: 32.2 PG (ref 26.6–33)
MCHC RBC AUTO-ENTMCNC: 34.2 G/DL (ref 31.5–35.7)
MCV RBC AUTO: 94.3 FL (ref 79–97)
MONOCYTES # BLD AUTO: 1.4 10*3/MM3 (ref 0.1–0.9)
MONOCYTES NFR BLD AUTO: 10.7 % (ref 5–12)
NEUTROPHILS # BLD AUTO: 10.9 10*3/MM3 (ref 1.7–7)
NEUTROPHILS NFR BLD AUTO: 85.1 % (ref 42.7–76)
NRBC BLD AUTO-RTO: 0 /100 WBC (ref 0–0.2)
PHOSPHATE SERPL-MCNC: 6.1 MG/DL (ref 2.4–4.7)
PLATELET # BLD AUTO: 101 10*3/MM3 (ref 140–450)
PMV BLD AUTO: 10.1 FL (ref 6–12)
POTASSIUM BLD-SCNC: 4.9 MMOL/L (ref 3.6–5.1)
PROTHROMBIN TIME: 12.1 SECONDS (ref 9.6–11.7)
RBC # BLD AUTO: 3.14 10*6/MM3 (ref 4.14–5.8)
SODIUM BLD-SCNC: 142 MMOL/L (ref 136–144)
WBC NRBC COR # BLD: 12.8 10*3/MM3 (ref 3.4–10.8)

## 2019-06-25 PROCEDURE — 93010 ELECTROCARDIOGRAM REPORT: CPT | Performed by: INTERNAL MEDICINE

## 2019-06-25 PROCEDURE — 71045 X-RAY EXAM CHEST 1 VIEW: CPT

## 2019-06-25 PROCEDURE — 25010000002 MORPHINE PER 10 MG: Performed by: NURSE PRACTITIONER

## 2019-06-25 PROCEDURE — 83735 ASSAY OF MAGNESIUM: CPT | Performed by: NURSE PRACTITIONER

## 2019-06-25 PROCEDURE — 25010000002 ONDANSETRON PER 1 MG: Performed by: NURSE PRACTITIONER

## 2019-06-25 PROCEDURE — 84100 ASSAY OF PHOSPHORUS: CPT | Performed by: NURSE PRACTITIONER

## 2019-06-25 PROCEDURE — 25010000002 MAGNESIUM SULFATE IN D5W 1G/100ML (PREMIX) 1-5 GM/100ML-% SOLUTION: Performed by: NURSE PRACTITIONER

## 2019-06-25 PROCEDURE — 25010000002 METOCLOPRAMIDE PER 10 MG: Performed by: NURSE PRACTITIONER

## 2019-06-25 PROCEDURE — 99232 SBSQ HOSP IP/OBS MODERATE 35: CPT | Performed by: NURSE PRACTITIONER

## 2019-06-25 PROCEDURE — 85025 COMPLETE CBC W/AUTO DIFF WBC: CPT | Performed by: NURSE PRACTITIONER

## 2019-06-25 PROCEDURE — 82962 GLUCOSE BLOOD TEST: CPT

## 2019-06-25 PROCEDURE — 25010000003 CEFAZOLIN PER 500 MG: Performed by: NURSE PRACTITIONER

## 2019-06-25 PROCEDURE — 94799 UNLISTED PULMONARY SVC/PX: CPT

## 2019-06-25 PROCEDURE — 82330 ASSAY OF CALCIUM: CPT | Performed by: NURSE PRACTITIONER

## 2019-06-25 PROCEDURE — 93005 ELECTROCARDIOGRAM TRACING: CPT | Performed by: NURSE PRACTITIONER

## 2019-06-25 PROCEDURE — 80048 BASIC METABOLIC PNL TOTAL CA: CPT | Performed by: NURSE PRACTITIONER

## 2019-06-25 PROCEDURE — 99024 POSTOP FOLLOW-UP VISIT: CPT | Performed by: NURSE PRACTITIONER

## 2019-06-25 PROCEDURE — 85610 PROTHROMBIN TIME: CPT | Performed by: NURSE PRACTITIONER

## 2019-06-25 PROCEDURE — 25010000002 CALCIUM GLUCONATE PER 10 ML: Performed by: NURSE PRACTITIONER

## 2019-06-25 RX ORDER — CHOLECALCIFEROL (VITAMIN D3) 125 MCG
5 CAPSULE ORAL NIGHTLY PRN
Status: DISCONTINUED | OUTPATIENT
Start: 2019-06-25 | End: 2019-06-30 | Stop reason: HOSPADM

## 2019-06-25 RX ORDER — CYCLOBENZAPRINE HCL 10 MG
10 TABLET ORAL 3 TIMES DAILY PRN
Status: DISCONTINUED | OUTPATIENT
Start: 2019-06-25 | End: 2019-06-30 | Stop reason: HOSPADM

## 2019-06-25 RX ORDER — TEMAZEPAM 15 MG/1
15 CAPSULE ORAL NIGHTLY PRN
Status: DISCONTINUED | OUTPATIENT
Start: 2019-06-25 | End: 2019-06-30 | Stop reason: HOSPADM

## 2019-06-25 RX ORDER — NOREPINEPHRINE BIT/0.9 % NACL 8 MG/250ML
.02-.3 INFUSION BOTTLE (ML) INTRAVENOUS
Status: DISCONTINUED | OUTPATIENT
Start: 2019-06-25 | End: 2019-06-27

## 2019-06-25 RX ORDER — AMIODARONE HYDROCHLORIDE 200 MG/1
400 TABLET ORAL EVERY 12 HOURS SCHEDULED
Status: DISCONTINUED | OUTPATIENT
Start: 2019-06-25 | End: 2019-06-30 | Stop reason: HOSPADM

## 2019-06-25 RX ORDER — ASPIRIN 81 MG/1
81 TABLET ORAL DAILY
Status: DISCONTINUED | OUTPATIENT
Start: 2019-06-25 | End: 2019-06-30 | Stop reason: HOSPADM

## 2019-06-25 RX ORDER — PANTOPRAZOLE SODIUM 40 MG/10ML
40 INJECTION, POWDER, LYOPHILIZED, FOR SOLUTION INTRAVENOUS
Status: DISCONTINUED | OUTPATIENT
Start: 2019-06-25 | End: 2019-06-26

## 2019-06-25 RX ORDER — METOCLOPRAMIDE HYDROCHLORIDE 5 MG/ML
5 INJECTION INTRAMUSCULAR; INTRAVENOUS EVERY 6 HOURS
Status: COMPLETED | OUTPATIENT
Start: 2019-06-25 | End: 2019-06-26

## 2019-06-25 RX ORDER — HYDROCODONE BITARTRATE AND ACETAMINOPHEN 5; 325 MG/1; MG/1
2 TABLET ORAL EVERY 4 HOURS PRN
Status: DISCONTINUED | OUTPATIENT
Start: 2019-06-25 | End: 2019-06-30 | Stop reason: HOSPADM

## 2019-06-25 RX ADMIN — ASPIRIN 81 MG: 81 TABLET, COATED ORAL at 10:19

## 2019-06-25 RX ADMIN — ACETAMINOPHEN 650 MG: 325 TABLET, FILM COATED ORAL at 00:49

## 2019-06-25 RX ADMIN — PANTOPRAZOLE SODIUM 40 MG: 40 INJECTION, POWDER, FOR SOLUTION INTRAVENOUS at 12:11

## 2019-06-25 RX ADMIN — DOCUSATE SODIUM 100 MG: 100 CAPSULE, LIQUID FILLED ORAL at 10:23

## 2019-06-25 RX ADMIN — AMIODARONE HYDROCHLORIDE 400 MG: 200 TABLET ORAL at 12:11

## 2019-06-25 RX ADMIN — MUPIROCIN 1 APPLICATION: 20 OINTMENT TOPICAL at 21:55

## 2019-06-25 RX ADMIN — MAGNESIUM SULFATE HEPTAHYDRATE 1 G: 1 INJECTION, SOLUTION INTRAVENOUS at 00:49

## 2019-06-25 RX ADMIN — METOCLOPRAMIDE 5 MG: 5 INJECTION, SOLUTION INTRAMUSCULAR; INTRAVENOUS at 23:41

## 2019-06-25 RX ADMIN — MELATONIN TAB 5 MG 5 MG: 5 TAB at 20:52

## 2019-06-25 RX ADMIN — CEFAZOLIN SODIUM 2 G: 1 INJECTION, POWDER, FOR SOLUTION INTRAMUSCULAR; INTRAVENOUS at 01:21

## 2019-06-25 RX ADMIN — ATORVASTATIN CALCIUM 40 MG: 40 TABLET, FILM COATED ORAL at 20:51

## 2019-06-25 RX ADMIN — CYCLOBENZAPRINE HYDROCHLORIDE 10 MG: 10 TABLET, FILM COATED ORAL at 10:22

## 2019-06-25 RX ADMIN — DOCUSATE SODIUM 100 MG: 100 CAPSULE, LIQUID FILLED ORAL at 20:51

## 2019-06-25 RX ADMIN — MORPHINE SULFATE 2 MG: 4 INJECTION INTRAVENOUS at 04:53

## 2019-06-25 RX ADMIN — MORPHINE SULFATE 2 MG: 4 INJECTION INTRAVENOUS at 02:54

## 2019-06-25 RX ADMIN — METOCLOPRAMIDE 5 MG: 5 INJECTION, SOLUTION INTRAMUSCULAR; INTRAVENOUS at 16:37

## 2019-06-25 RX ADMIN — MORPHINE SULFATE 2 MG: 4 INJECTION INTRAVENOUS at 01:20

## 2019-06-25 RX ADMIN — POLYETHYLENE GLYCOL 3350 17 G: 17 POWDER, FOR SOLUTION ORAL at 20:52

## 2019-06-25 RX ADMIN — NOREPINEPHRINE BITARTRATE 0.02 MCG/KG/MIN: 1 INJECTION, SOLUTION, CONCENTRATE INTRAVENOUS at 00:52

## 2019-06-25 RX ADMIN — ONDANSETRON 4 MG: 2 INJECTION INTRAMUSCULAR; INTRAVENOUS at 14:04

## 2019-06-25 RX ADMIN — POLYETHYLENE GLYCOL 3350 17 G: 17 POWDER, FOR SOLUTION ORAL at 10:19

## 2019-06-25 RX ADMIN — ACETAMINOPHEN 650 MG: 325 TABLET, FILM COATED ORAL at 06:35

## 2019-06-25 RX ADMIN — CALCIUM GLUCONATE 2 G: 98 INJECTION, SOLUTION INTRAVENOUS at 10:22

## 2019-06-25 RX ADMIN — MORPHINE SULFATE 2 MG: 4 INJECTION INTRAVENOUS at 06:33

## 2019-06-25 RX ADMIN — CYCLOBENZAPRINE HYDROCHLORIDE 10 MG: 10 TABLET, FILM COATED ORAL at 23:58

## 2019-06-25 RX ADMIN — HYDROCODONE BITARTRATE AND ACETAMINOPHEN 2 TABLET: 5; 325 TABLET ORAL at 08:40

## 2019-06-25 RX ADMIN — CEFAZOLIN SODIUM 2 G: 1 INJECTION, POWDER, FOR SOLUTION INTRAMUSCULAR; INTRAVENOUS at 16:05

## 2019-06-25 RX ADMIN — CEFAZOLIN SODIUM 2 G: 1 INJECTION, POWDER, FOR SOLUTION INTRAMUSCULAR; INTRAVENOUS at 06:49

## 2019-06-25 RX ADMIN — TEMAZEPAM 15 MG: 15 CAPSULE ORAL at 20:52

## 2019-06-25 RX ADMIN — MUPIROCIN 1 APPLICATION: 20 OINTMENT TOPICAL at 10:23

## 2019-06-25 RX ADMIN — HYDROCODONE BITARTRATE AND ACETAMINOPHEN 2 TABLET: 5; 325 TABLET ORAL at 02:53

## 2019-06-25 RX ADMIN — HYDROCODONE BITARTRATE AND ACETAMINOPHEN 2 TABLET: 5; 325 TABLET ORAL at 14:04

## 2019-06-25 RX ADMIN — SENNOSIDES 2 TABLET: 8.6 TABLET, FILM COATED ORAL at 20:51

## 2019-06-25 RX ADMIN — CEFAZOLIN SODIUM 2 G: 1 INJECTION, POWDER, FOR SOLUTION INTRAMUSCULAR; INTRAVENOUS at 23:41

## 2019-06-25 RX ADMIN — MAGNESIUM SULFATE HEPTAHYDRATE 1 G: 1 INJECTION, SOLUTION INTRAVENOUS at 06:33

## 2019-06-25 RX ADMIN — MORPHINE SULFATE 2 MG: 4 INJECTION INTRAVENOUS at 08:34

## 2019-06-25 RX ADMIN — AMIODARONE HYDROCHLORIDE 400 MG: 200 TABLET ORAL at 20:51

## 2019-06-25 RX ADMIN — MAGNESIUM SULFATE HEPTAHYDRATE 1 G: 1 INJECTION, SOLUTION INTRAVENOUS at 15:23

## 2019-06-25 RX ADMIN — HYDROCODONE BITARTRATE AND ACETAMINOPHEN 2 TABLET: 5; 325 TABLET ORAL at 20:52

## 2019-06-25 RX ADMIN — ONDANSETRON 4 MG: 2 INJECTION INTRAMUSCULAR; INTRAVENOUS at 06:33

## 2019-06-26 ENCOUNTER — APPOINTMENT (OUTPATIENT)
Dept: GENERAL RADIOLOGY | Facility: HOSPITAL | Age: 69
End: 2019-06-26

## 2019-06-26 LAB
ANION GAP SERPL CALCULATED.3IONS-SCNC: 11 MMOL/L (ref 10–20)
BUN BLD-MCNC: 42 MG/DL (ref 8–20)
BUN/CREAT SERPL: 17.5 (ref 6.2–20.3)
CA-I SERPL ISE-MCNC: 1.15 MMOL/L (ref 1.2–1.3)
CALCIUM SPEC-SCNC: 8.7 MG/DL (ref 8.9–10.3)
CHLORIDE SERPL-SCNC: 102 MMOL/L (ref 101–111)
CO2 SERPL-SCNC: 22 MMOL/L (ref 22–32)
CREAT BLD-MCNC: 2.4 MG/DL (ref 0.7–1.2)
DEPRECATED RDW RBC AUTO: 47.7 FL (ref 37–54)
ERYTHROCYTE [DISTWIDTH] IN BLOOD BY AUTOMATED COUNT: 14.3 % (ref 12.3–15.4)
GFR SERPL CREATININE-BSD FRML MDRD: 27 ML/MIN/1.73
GLUCOSE BLD-MCNC: 131 MG/DL (ref 65–99)
GLUCOSE BLDC GLUCOMTR-MCNC: 110 MG/DL (ref 70–105)
GLUCOSE BLDC GLUCOMTR-MCNC: 111 MG/DL (ref 70–105)
GLUCOSE BLDC GLUCOMTR-MCNC: 114 MG/DL (ref 70–105)
GLUCOSE BLDC GLUCOMTR-MCNC: 122 MG/DL (ref 70–105)
GLUCOSE BLDC GLUCOMTR-MCNC: 127 MG/DL (ref 70–105)
GLUCOSE BLDC GLUCOMTR-MCNC: 128 MG/DL (ref 70–105)
GLUCOSE BLDC GLUCOMTR-MCNC: 131 MG/DL (ref 70–105)
GLUCOSE BLDC GLUCOMTR-MCNC: 131 MG/DL (ref 70–105)
GLUCOSE BLDC GLUCOMTR-MCNC: 134 MG/DL (ref 70–105)
GLUCOSE BLDC GLUCOMTR-MCNC: 135 MG/DL (ref 70–105)
GLUCOSE BLDC GLUCOMTR-MCNC: 140 MG/DL (ref 70–105)
GLUCOSE BLDC GLUCOMTR-MCNC: 142 MG/DL (ref 70–105)
GLUCOSE BLDC GLUCOMTR-MCNC: 177 MG/DL (ref 70–105)
HCT VFR BLD AUTO: 29.6 % (ref 37.5–51)
HGB BLD-MCNC: 9.9 G/DL (ref 13–17.7)
MCH RBC QN AUTO: 31.9 PG (ref 26.6–33)
MCHC RBC AUTO-ENTMCNC: 33.4 G/DL (ref 31.5–35.7)
MCV RBC AUTO: 95.6 FL (ref 79–97)
MRSA SPEC QL CULT: NORMAL
PLATELET # BLD AUTO: 90 10*3/MM3 (ref 140–450)
PMV BLD AUTO: 9.8 FL (ref 6–12)
POTASSIUM BLD-SCNC: 4.4 MMOL/L (ref 3.6–5.1)
RBC # BLD AUTO: 3.1 10*6/MM3 (ref 4.14–5.8)
SODIUM BLD-SCNC: 135 MMOL/L (ref 136–144)
WBC NRBC COR # BLD: 13.3 10*3/MM3 (ref 3.4–10.8)

## 2019-06-26 PROCEDURE — 82330 ASSAY OF CALCIUM: CPT | Performed by: NURSE PRACTITIONER

## 2019-06-26 PROCEDURE — 97116 GAIT TRAINING THERAPY: CPT

## 2019-06-26 PROCEDURE — 94799 UNLISTED PULMONARY SVC/PX: CPT

## 2019-06-26 PROCEDURE — 25010000002 CALCIUM GLUCONATE PER 10 ML: Performed by: NURSE PRACTITIONER

## 2019-06-26 PROCEDURE — 25010000002 METOCLOPRAMIDE PER 10 MG: Performed by: NURSE PRACTITIONER

## 2019-06-26 PROCEDURE — 97535 SELF CARE MNGMENT TRAINING: CPT

## 2019-06-26 PROCEDURE — 71045 X-RAY EXAM CHEST 1 VIEW: CPT

## 2019-06-26 PROCEDURE — 25010000002 MORPHINE PER 10 MG: Performed by: NURSE PRACTITIONER

## 2019-06-26 PROCEDURE — 25010000003 CEFAZOLIN PER 500 MG: Performed by: NURSE PRACTITIONER

## 2019-06-26 PROCEDURE — 25010000002 ONDANSETRON PER 1 MG: Performed by: NURSE PRACTITIONER

## 2019-06-26 PROCEDURE — 99232 SBSQ HOSP IP/OBS MODERATE 35: CPT | Performed by: INTERNAL MEDICINE

## 2019-06-26 PROCEDURE — 97163 PT EVAL HIGH COMPLEX 45 MIN: CPT

## 2019-06-26 PROCEDURE — 97166 OT EVAL MOD COMPLEX 45 MIN: CPT

## 2019-06-26 PROCEDURE — 85027 COMPLETE CBC AUTOMATED: CPT | Performed by: NURSE PRACTITIONER

## 2019-06-26 PROCEDURE — 93010 ELECTROCARDIOGRAM REPORT: CPT | Performed by: INTERNAL MEDICINE

## 2019-06-26 PROCEDURE — 80048 BASIC METABOLIC PNL TOTAL CA: CPT | Performed by: NURSE PRACTITIONER

## 2019-06-26 PROCEDURE — 97530 THERAPEUTIC ACTIVITIES: CPT

## 2019-06-26 PROCEDURE — 93005 ELECTROCARDIOGRAM TRACING: CPT | Performed by: NURSE PRACTITIONER

## 2019-06-26 PROCEDURE — 99024 POSTOP FOLLOW-UP VISIT: CPT | Performed by: NURSE PRACTITIONER

## 2019-06-26 PROCEDURE — 82962 GLUCOSE BLOOD TEST: CPT

## 2019-06-26 PROCEDURE — 94640 AIRWAY INHALATION TREATMENT: CPT

## 2019-06-26 RX ORDER — BUMETANIDE 0.25 MG/ML
1 INJECTION INTRAMUSCULAR; INTRAVENOUS ONCE
Status: COMPLETED | OUTPATIENT
Start: 2019-06-26 | End: 2019-06-26

## 2019-06-26 RX ORDER — IPRATROPIUM BROMIDE AND ALBUTEROL SULFATE 2.5; .5 MG/3ML; MG/3ML
3 SOLUTION RESPIRATORY (INHALATION)
Status: DISCONTINUED | OUTPATIENT
Start: 2019-06-26 | End: 2019-06-30 | Stop reason: HOSPADM

## 2019-06-26 RX ORDER — PANTOPRAZOLE SODIUM 40 MG/1
40 TABLET, DELAYED RELEASE ORAL
Status: DISCONTINUED | OUTPATIENT
Start: 2019-06-27 | End: 2019-06-30 | Stop reason: HOSPADM

## 2019-06-26 RX ORDER — CALCIUM GLUCONATE IN 0.9% NACL 3 G/100 ML
3 PLASTIC BAG, INJECTION (ML) INTRAVENOUS ONCE
Status: COMPLETED | OUTPATIENT
Start: 2019-06-26 | End: 2019-06-26

## 2019-06-26 RX ORDER — GUAIFENESIN 600 MG/1
600 TABLET, EXTENDED RELEASE ORAL EVERY 12 HOURS SCHEDULED
Status: DISCONTINUED | OUTPATIENT
Start: 2019-06-26 | End: 2019-06-29

## 2019-06-26 RX ADMIN — DOCUSATE SODIUM 100 MG: 100 CAPSULE, LIQUID FILLED ORAL at 20:39

## 2019-06-26 RX ADMIN — POLYETHYLENE GLYCOL 3350 17 G: 17 POWDER, FOR SOLUTION ORAL at 08:00

## 2019-06-26 RX ADMIN — SODIUM CHLORIDE 1.7 UNITS/HR: 900 INJECTION, SOLUTION INTRAVENOUS at 06:16

## 2019-06-26 RX ADMIN — ATORVASTATIN CALCIUM 40 MG: 40 TABLET, FILM COATED ORAL at 20:39

## 2019-06-26 RX ADMIN — SENNOSIDES 2 TABLET: 8.6 TABLET, FILM COATED ORAL at 20:39

## 2019-06-26 RX ADMIN — CALCIUM GLUCONATE 3 G: 98 INJECTION, SOLUTION INTRAVENOUS at 12:33

## 2019-06-26 RX ADMIN — AMIODARONE HYDROCHLORIDE 400 MG: 200 TABLET ORAL at 07:54

## 2019-06-26 RX ADMIN — HYDROCODONE BITARTRATE AND ACETAMINOPHEN 2 TABLET: 5; 325 TABLET ORAL at 20:39

## 2019-06-26 RX ADMIN — HYDROCODONE BITARTRATE AND ACETAMINOPHEN 1 TABLET: 5; 325 TABLET ORAL at 15:53

## 2019-06-26 RX ADMIN — CEFAZOLIN SODIUM 2 G: 1 INJECTION, POWDER, FOR SOLUTION INTRAMUSCULAR; INTRAVENOUS at 06:16

## 2019-06-26 RX ADMIN — PANTOPRAZOLE SODIUM 40 MG: 40 INJECTION, POWDER, FOR SOLUTION INTRAVENOUS at 06:16

## 2019-06-26 RX ADMIN — IPRATROPIUM BROMIDE AND ALBUTEROL SULFATE 3 ML: .5; 3 SOLUTION RESPIRATORY (INHALATION) at 19:34

## 2019-06-26 RX ADMIN — MELATONIN TAB 5 MG 5 MG: 5 TAB at 20:39

## 2019-06-26 RX ADMIN — MUPIROCIN 1 APPLICATION: 20 OINTMENT TOPICAL at 20:40

## 2019-06-26 RX ADMIN — CHLORHEXIDINE GLUCONATE 0.12% ORAL RINSE 15 ML: 1.2 LIQUID ORAL at 20:40

## 2019-06-26 RX ADMIN — DOCUSATE SODIUM 100 MG: 100 CAPSULE, LIQUID FILLED ORAL at 08:00

## 2019-06-26 RX ADMIN — AMIODARONE HYDROCHLORIDE 400 MG: 200 TABLET ORAL at 20:39

## 2019-06-26 RX ADMIN — MORPHINE SULFATE 2 MG: 4 INJECTION INTRAVENOUS at 00:02

## 2019-06-26 RX ADMIN — HYDROCODONE BITARTRATE AND ACETAMINOPHEN 2 TABLET: 5; 325 TABLET ORAL at 11:30

## 2019-06-26 RX ADMIN — METOCLOPRAMIDE 5 MG: 5 INJECTION, SOLUTION INTRAMUSCULAR; INTRAVENOUS at 04:08

## 2019-06-26 RX ADMIN — CYCLOBENZAPRINE HYDROCHLORIDE 10 MG: 10 TABLET, FILM COATED ORAL at 08:00

## 2019-06-26 RX ADMIN — POLYETHYLENE GLYCOL 3350 17 G: 17 POWDER, FOR SOLUTION ORAL at 20:38

## 2019-06-26 RX ADMIN — ONDANSETRON 4 MG: 2 INJECTION INTRAMUSCULAR; INTRAVENOUS at 06:22

## 2019-06-26 RX ADMIN — BUMETANIDE 1 MG: 0.25 INJECTION INTRAMUSCULAR; INTRAVENOUS at 11:41

## 2019-06-26 RX ADMIN — BUMETANIDE 1 MG: 0.25 INJECTION INTRAMUSCULAR; INTRAVENOUS at 17:11

## 2019-06-26 RX ADMIN — MUPIROCIN 1 APPLICATION: 20 OINTMENT TOPICAL at 08:01

## 2019-06-26 RX ADMIN — HYDROCODONE BITARTRATE AND ACETAMINOPHEN 2 TABLET: 5; 325 TABLET ORAL at 04:08

## 2019-06-26 RX ADMIN — ASPIRIN 81 MG: 81 TABLET, COATED ORAL at 08:00

## 2019-06-26 RX ADMIN — GUAIFENESIN 600 MG: 600 TABLET, EXTENDED RELEASE ORAL at 17:11

## 2019-06-27 LAB
ALBUMIN SERPL-MCNC: 3.9 G/DL (ref 3.5–4.8)
ANION GAP SERPL CALCULATED.3IONS-SCNC: 16.6 MMOL/L (ref 10–20)
BUN BLD-MCNC: 53 MG/DL (ref 8–20)
BUN/CREAT SERPL: 19.6 (ref 6.2–20.3)
CALCIUM SPEC-SCNC: 9.1 MG/DL (ref 8.9–10.3)
CHLORIDE SERPL-SCNC: 100 MMOL/L (ref 101–111)
CO2 SERPL-SCNC: 25 MMOL/L (ref 22–32)
CREAT BLD-MCNC: 2.7 MG/DL (ref 0.7–1.2)
DEPRECATED RDW RBC AUTO: 47.3 FL (ref 37–54)
ERYTHROCYTE [DISTWIDTH] IN BLOOD BY AUTOMATED COUNT: 14 % (ref 12.3–15.4)
GFR SERPL CREATININE-BSD FRML MDRD: 24 ML/MIN/1.73
GLUCOSE BLD-MCNC: 111 MG/DL (ref 65–99)
GLUCOSE BLDC GLUCOMTR-MCNC: 101 MG/DL (ref 70–105)
GLUCOSE BLDC GLUCOMTR-MCNC: 115 MG/DL (ref 70–105)
GLUCOSE BLDC GLUCOMTR-MCNC: 115 MG/DL (ref 70–105)
GLUCOSE BLDC GLUCOMTR-MCNC: 152 MG/DL (ref 70–105)
GLUCOSE BLDC GLUCOMTR-MCNC: 155 MG/DL (ref 70–105)
GLUCOSE BLDC GLUCOMTR-MCNC: 165 MG/DL (ref 70–105)
GLUCOSE BLDC GLUCOMTR-MCNC: 194 MG/DL (ref 70–105)
GLUCOSE BLDC GLUCOMTR-MCNC: 93 MG/DL (ref 70–105)
HCT VFR BLD AUTO: 28 % (ref 37.5–51)
HGB BLD-MCNC: 9.5 G/DL (ref 13–17.7)
MCH RBC QN AUTO: 32.4 PG (ref 26.6–33)
MCHC RBC AUTO-ENTMCNC: 34 G/DL (ref 31.5–35.7)
MCV RBC AUTO: 95.4 FL (ref 79–97)
PHOSPHATE SERPL-MCNC: 4.5 MG/DL (ref 2.4–4.7)
PLATELET # BLD AUTO: 75 10*3/MM3 (ref 140–450)
PMV BLD AUTO: 10.5 FL (ref 6–12)
POTASSIUM BLD-SCNC: 4.6 MMOL/L (ref 3.6–5.1)
RBC # BLD AUTO: 2.93 10*6/MM3 (ref 4.14–5.8)
SODIUM BLD-SCNC: 137 MMOL/L (ref 136–144)
WBC NRBC COR # BLD: 9.3 10*3/MM3 (ref 3.4–10.8)

## 2019-06-27 PROCEDURE — 97530 THERAPEUTIC ACTIVITIES: CPT

## 2019-06-27 PROCEDURE — 82962 GLUCOSE BLOOD TEST: CPT

## 2019-06-27 PROCEDURE — 99232 SBSQ HOSP IP/OBS MODERATE 35: CPT | Performed by: NURSE PRACTITIONER

## 2019-06-27 PROCEDURE — 94799 UNLISTED PULMONARY SVC/PX: CPT

## 2019-06-27 PROCEDURE — 80069 RENAL FUNCTION PANEL: CPT | Performed by: INTERNAL MEDICINE

## 2019-06-27 PROCEDURE — 85027 COMPLETE CBC AUTOMATED: CPT | Performed by: NURSE PRACTITIONER

## 2019-06-27 PROCEDURE — 99024 POSTOP FOLLOW-UP VISIT: CPT | Performed by: NURSE PRACTITIONER

## 2019-06-27 RX ORDER — ACETYLCYSTEINE 200 MG/ML
1 SOLUTION ORAL; RESPIRATORY (INHALATION)
Status: DISCONTINUED | OUTPATIENT
Start: 2019-06-27 | End: 2019-06-29

## 2019-06-27 RX ADMIN — AMIODARONE HYDROCHLORIDE 400 MG: 200 TABLET ORAL at 09:55

## 2019-06-27 RX ADMIN — MUPIROCIN 1 APPLICATION: 20 OINTMENT TOPICAL at 09:57

## 2019-06-27 RX ADMIN — DOCUSATE SODIUM 100 MG: 100 CAPSULE, LIQUID FILLED ORAL at 09:55

## 2019-06-27 RX ADMIN — MUPIROCIN 1 APPLICATION: 20 OINTMENT TOPICAL at 20:22

## 2019-06-27 RX ADMIN — CYCLOBENZAPRINE HYDROCHLORIDE 10 MG: 10 TABLET, FILM COATED ORAL at 20:20

## 2019-06-27 RX ADMIN — IPRATROPIUM BROMIDE AND ALBUTEROL SULFATE 3 ML: .5; 3 SOLUTION RESPIRATORY (INHALATION) at 14:39

## 2019-06-27 RX ADMIN — IPRATROPIUM BROMIDE AND ALBUTEROL SULFATE 3 ML: .5; 3 SOLUTION RESPIRATORY (INHALATION) at 20:56

## 2019-06-27 RX ADMIN — AMIODARONE HYDROCHLORIDE 400 MG: 200 TABLET ORAL at 20:20

## 2019-06-27 RX ADMIN — IPRATROPIUM BROMIDE AND ALBUTEROL SULFATE 3 ML: .5; 3 SOLUTION RESPIRATORY (INHALATION) at 10:58

## 2019-06-27 RX ADMIN — HYDROCODONE BITARTRATE AND ACETAMINOPHEN 1 TABLET: 5; 325 TABLET ORAL at 09:56

## 2019-06-27 RX ADMIN — IPRATROPIUM BROMIDE AND ALBUTEROL SULFATE 3 ML: .5; 3 SOLUTION RESPIRATORY (INHALATION) at 07:35

## 2019-06-27 RX ADMIN — CHLORHEXIDINE GLUCONATE 0.12% ORAL RINSE 15 ML: 1.2 LIQUID ORAL at 20:21

## 2019-06-27 RX ADMIN — GUAIFENESIN 600 MG: 600 TABLET, EXTENDED RELEASE ORAL at 09:55

## 2019-06-27 RX ADMIN — HYDROCODONE BITARTRATE AND ACETAMINOPHEN 1 TABLET: 5; 325 TABLET ORAL at 15:08

## 2019-06-27 RX ADMIN — HYDROCODONE BITARTRATE AND ACETAMINOPHEN 1 TABLET: 5; 325 TABLET ORAL at 06:19

## 2019-06-27 RX ADMIN — PANTOPRAZOLE SODIUM 40 MG: 40 TABLET, DELAYED RELEASE ORAL at 06:18

## 2019-06-27 RX ADMIN — POLYETHYLENE GLYCOL 3350 17 G: 17 POWDER, FOR SOLUTION ORAL at 20:20

## 2019-06-27 RX ADMIN — DOCUSATE SODIUM 100 MG: 100 CAPSULE, LIQUID FILLED ORAL at 20:20

## 2019-06-27 RX ADMIN — ATORVASTATIN CALCIUM 40 MG: 40 TABLET, FILM COATED ORAL at 20:20

## 2019-06-27 RX ADMIN — GUAIFENESIN 600 MG: 600 TABLET, EXTENDED RELEASE ORAL at 20:19

## 2019-06-27 RX ADMIN — HYDROCODONE BITARTRATE AND ACETAMINOPHEN 1 TABLET: 5; 325 TABLET ORAL at 20:18

## 2019-06-27 RX ADMIN — ASPIRIN 81 MG: 81 TABLET, COATED ORAL at 09:56

## 2019-06-27 RX ADMIN — SENNOSIDES 2 TABLET: 8.6 TABLET, FILM COATED ORAL at 20:20

## 2019-06-27 RX ADMIN — POLYETHYLENE GLYCOL 3350 17 G: 17 POWDER, FOR SOLUTION ORAL at 09:56

## 2019-06-27 RX ADMIN — HYDROCODONE BITARTRATE AND ACETAMINOPHEN 1 TABLET: 5; 325 TABLET ORAL at 02:45

## 2019-06-28 LAB
ALBUMIN SERPL-MCNC: 3.7 G/DL (ref 3.5–4.8)
ALBUMIN/GLOB SERPL: 1.2 G/DL (ref 1–1.7)
ALP SERPL-CCNC: 49 U/L (ref 32–91)
ALT SERPL W P-5'-P-CCNC: 10 U/L (ref 17–63)
ANION GAP SERPL CALCULATED.3IONS-SCNC: 11.9 MMOL/L (ref 10–20)
AST SERPL-CCNC: 36 U/L (ref 15–41)
BILIRUB SERPL-MCNC: 0.9 MG/DL (ref 0.3–1.2)
BUN BLD-MCNC: 50 MG/DL (ref 8–20)
BUN/CREAT SERPL: 23.8 (ref 6.2–20.3)
CALCIUM SPEC-SCNC: 8.9 MG/DL (ref 8.9–10.3)
CHLORIDE SERPL-SCNC: 102 MMOL/L (ref 101–111)
CO2 SERPL-SCNC: 26 MMOL/L (ref 22–32)
CREAT BLD-MCNC: 2.1 MG/DL (ref 0.7–1.2)
DEPRECATED RDW RBC AUTO: 47.3 FL (ref 37–54)
ERYTHROCYTE [DISTWIDTH] IN BLOOD BY AUTOMATED COUNT: 13.9 % (ref 12.3–15.4)
GFR SERPL CREATININE-BSD FRML MDRD: 32 ML/MIN/1.73
GLOBULIN UR ELPH-MCNC: 3.2 GM/DL (ref 2.5–3.8)
GLUCOSE BLD-MCNC: 143 MG/DL (ref 65–99)
GLUCOSE BLDC GLUCOMTR-MCNC: 127 MG/DL (ref 70–105)
GLUCOSE BLDC GLUCOMTR-MCNC: 130 MG/DL (ref 70–105)
GLUCOSE BLDC GLUCOMTR-MCNC: 131 MG/DL (ref 70–105)
GLUCOSE BLDC GLUCOMTR-MCNC: 149 MG/DL (ref 70–105)
GLUCOSE BLDC GLUCOMTR-MCNC: 152 MG/DL (ref 70–105)
HCT VFR BLD AUTO: 29.4 % (ref 37.5–51)
HGB BLD-MCNC: 9.9 G/DL (ref 13–17.7)
MCH RBC QN AUTO: 32.5 PG (ref 26.6–33)
MCHC RBC AUTO-ENTMCNC: 33.8 G/DL (ref 31.5–35.7)
MCV RBC AUTO: 96.1 FL (ref 79–97)
PLATELET # BLD AUTO: 105 10*3/MM3 (ref 140–450)
PMV BLD AUTO: 10.2 FL (ref 6–12)
POTASSIUM BLD-SCNC: 3.9 MMOL/L (ref 3.6–5.1)
PROT SERPL-MCNC: 6.9 G/DL (ref 6.1–7.9)
RBC # BLD AUTO: 3.06 10*6/MM3 (ref 4.14–5.8)
SODIUM BLD-SCNC: 136 MMOL/L (ref 136–144)
WBC NRBC COR # BLD: 7 10*3/MM3 (ref 3.4–10.8)

## 2019-06-28 PROCEDURE — 63710000001 INSULIN LISPRO (HUMAN) PER 5 UNITS: Performed by: NURSE PRACTITIONER

## 2019-06-28 PROCEDURE — 82962 GLUCOSE BLOOD TEST: CPT

## 2019-06-28 PROCEDURE — 94799 UNLISTED PULMONARY SVC/PX: CPT

## 2019-06-28 PROCEDURE — 97110 THERAPEUTIC EXERCISES: CPT

## 2019-06-28 PROCEDURE — 99232 SBSQ HOSP IP/OBS MODERATE 35: CPT | Performed by: INTERNAL MEDICINE

## 2019-06-28 PROCEDURE — 99024 POSTOP FOLLOW-UP VISIT: CPT | Performed by: NURSE PRACTITIONER

## 2019-06-28 PROCEDURE — 85027 COMPLETE CBC AUTOMATED: CPT | Performed by: INTERNAL MEDICINE

## 2019-06-28 PROCEDURE — 97530 THERAPEUTIC ACTIVITIES: CPT

## 2019-06-28 PROCEDURE — 80053 COMPREHEN METABOLIC PANEL: CPT | Performed by: NURSE PRACTITIONER

## 2019-06-28 PROCEDURE — 25010000002 ENOXAPARIN PER 10 MG: Performed by: NURSE PRACTITIONER

## 2019-06-28 RX ORDER — NICOTINE POLACRILEX 4 MG
15 LOZENGE BUCCAL
Status: DISCONTINUED | OUTPATIENT
Start: 2019-06-28 | End: 2019-06-30 | Stop reason: HOSPADM

## 2019-06-28 RX ORDER — FUROSEMIDE 20 MG/1
20 TABLET ORAL DAILY
Status: DISCONTINUED | OUTPATIENT
Start: 2019-06-28 | End: 2019-06-29

## 2019-06-28 RX ORDER — LACTULOSE 10 G/15ML
30 SOLUTION ORAL DAILY
Status: DISCONTINUED | OUTPATIENT
Start: 2019-06-28 | End: 2019-06-28

## 2019-06-28 RX ORDER — DEXTROSE MONOHYDRATE 25 G/50ML
25 INJECTION, SOLUTION INTRAVENOUS
Status: DISCONTINUED | OUTPATIENT
Start: 2019-06-28 | End: 2019-06-30 | Stop reason: HOSPADM

## 2019-06-28 RX ADMIN — AMIODARONE HYDROCHLORIDE 400 MG: 200 TABLET ORAL at 20:54

## 2019-06-28 RX ADMIN — GUAIFENESIN 600 MG: 600 TABLET, EXTENDED RELEASE ORAL at 08:30

## 2019-06-28 RX ADMIN — INSULIN LISPRO 2 UNITS: 100 INJECTION, SOLUTION INTRAVENOUS; SUBCUTANEOUS at 18:00

## 2019-06-28 RX ADMIN — IPRATROPIUM BROMIDE AND ALBUTEROL SULFATE 3 ML: .5; 3 SOLUTION RESPIRATORY (INHALATION) at 11:45

## 2019-06-28 RX ADMIN — MUPIROCIN 1 APPLICATION: 20 OINTMENT TOPICAL at 08:32

## 2019-06-28 RX ADMIN — ATORVASTATIN CALCIUM 40 MG: 40 TABLET, FILM COATED ORAL at 20:54

## 2019-06-28 RX ADMIN — CHLORHEXIDINE GLUCONATE 0.12% ORAL RINSE 15 ML: 1.2 LIQUID ORAL at 20:55

## 2019-06-28 RX ADMIN — FUROSEMIDE 20 MG: 20 TABLET ORAL at 12:44

## 2019-06-28 RX ADMIN — ASPIRIN 81 MG: 81 TABLET, COATED ORAL at 08:30

## 2019-06-28 RX ADMIN — PANTOPRAZOLE SODIUM 40 MG: 40 TABLET, DELAYED RELEASE ORAL at 05:06

## 2019-06-28 RX ADMIN — ENOXAPARIN SODIUM 30 MG: 30 INJECTION SUBCUTANEOUS at 16:10

## 2019-06-28 RX ADMIN — AMIODARONE HYDROCHLORIDE 400 MG: 200 TABLET ORAL at 08:30

## 2019-06-28 RX ADMIN — IPRATROPIUM BROMIDE AND ALBUTEROL SULFATE 3 ML: .5; 3 SOLUTION RESPIRATORY (INHALATION) at 07:22

## 2019-06-28 RX ADMIN — DOCUSATE SODIUM 100 MG: 100 CAPSULE, LIQUID FILLED ORAL at 08:30

## 2019-06-28 RX ADMIN — ACETYLCYSTEINE 1 ML: 200 SOLUTION ORAL; RESPIRATORY (INHALATION) at 07:24

## 2019-06-28 RX ADMIN — GUAIFENESIN 600 MG: 600 TABLET, EXTENDED RELEASE ORAL at 20:54

## 2019-06-28 RX ADMIN — MUPIROCIN 1 APPLICATION: 20 OINTMENT TOPICAL at 20:55

## 2019-06-28 RX ADMIN — METOPROLOL TARTRATE 12.5 MG: 25 TABLET, FILM COATED ORAL at 08:40

## 2019-06-28 RX ADMIN — IPRATROPIUM BROMIDE AND ALBUTEROL SULFATE 3 ML: .5; 3 SOLUTION RESPIRATORY (INHALATION) at 15:40

## 2019-06-28 RX ADMIN — HYDROCODONE BITARTRATE AND ACETAMINOPHEN 2 TABLET: 5; 325 TABLET ORAL at 09:36

## 2019-06-28 RX ADMIN — METOPROLOL TARTRATE 12.5 MG: 25 TABLET, FILM COATED ORAL at 20:54

## 2019-06-28 RX ADMIN — ACETYLCYSTEINE 1 ML: 200 SOLUTION ORAL; RESPIRATORY (INHALATION) at 20:44

## 2019-06-28 RX ADMIN — CHLORHEXIDINE GLUCONATE 0.12% ORAL RINSE 15 ML: 1.2 LIQUID ORAL at 12:44

## 2019-06-28 RX ADMIN — IPRATROPIUM BROMIDE AND ALBUTEROL SULFATE 3 ML: .5; 3 SOLUTION RESPIRATORY (INHALATION) at 20:43

## 2019-06-29 LAB
ALBUMIN SERPL-MCNC: 3.3 G/DL (ref 3.5–4.8)
ANION GAP SERPL CALCULATED.3IONS-SCNC: 11 MMOL/L (ref 10–20)
BUN BLD-MCNC: 43 MG/DL (ref 8–20)
BUN/CREAT SERPL: 25.3 (ref 6.2–20.3)
CALCIUM SPEC-SCNC: 8.7 MG/DL (ref 8.9–10.3)
CHLORIDE SERPL-SCNC: 100 MMOL/L (ref 101–111)
CO2 SERPL-SCNC: 27 MMOL/L (ref 22–32)
CREAT BLD-MCNC: 1.7 MG/DL (ref 0.7–1.2)
DEPRECATED RDW RBC AUTO: 46.4 FL (ref 37–54)
ERYTHROCYTE [DISTWIDTH] IN BLOOD BY AUTOMATED COUNT: 13.7 % (ref 12.3–15.4)
GFR SERPL CREATININE-BSD FRML MDRD: 40 ML/MIN/1.73
GLUCOSE BLD-MCNC: 121 MG/DL (ref 65–99)
GLUCOSE BLDC GLUCOMTR-MCNC: 129 MG/DL (ref 70–105)
GLUCOSE BLDC GLUCOMTR-MCNC: 132 MG/DL (ref 70–105)
GLUCOSE BLDC GLUCOMTR-MCNC: 142 MG/DL (ref 70–105)
GLUCOSE BLDC GLUCOMTR-MCNC: 151 MG/DL (ref 70–105)
HCT VFR BLD AUTO: 29.5 % (ref 37.5–51)
HGB BLD-MCNC: 10.1 G/DL (ref 13–17.7)
MCH RBC QN AUTO: 32.6 PG (ref 26.6–33)
MCHC RBC AUTO-ENTMCNC: 34.2 G/DL (ref 31.5–35.7)
MCV RBC AUTO: 95.3 FL (ref 79–97)
PHOSPHATE SERPL-MCNC: 2.6 MG/DL (ref 2.4–4.7)
PLATELET # BLD AUTO: 136 10*3/MM3 (ref 140–450)
PMV BLD AUTO: 9.9 FL (ref 6–12)
POTASSIUM BLD-SCNC: 3 MMOL/L (ref 3.6–5.1)
RBC # BLD AUTO: 3.1 10*6/MM3 (ref 4.14–5.8)
SODIUM BLD-SCNC: 135 MMOL/L (ref 136–144)
WBC NRBC COR # BLD: 5.9 10*3/MM3 (ref 3.4–10.8)

## 2019-06-29 PROCEDURE — 25010000002 ENOXAPARIN PER 10 MG: Performed by: NURSE PRACTITIONER

## 2019-06-29 PROCEDURE — 63710000001 INSULIN LISPRO (HUMAN) PER 5 UNITS: Performed by: NURSE PRACTITIONER

## 2019-06-29 PROCEDURE — 80069 RENAL FUNCTION PANEL: CPT | Performed by: INTERNAL MEDICINE

## 2019-06-29 PROCEDURE — 94799 UNLISTED PULMONARY SVC/PX: CPT

## 2019-06-29 PROCEDURE — 99232 SBSQ HOSP IP/OBS MODERATE 35: CPT | Performed by: INTERNAL MEDICINE

## 2019-06-29 PROCEDURE — 85027 COMPLETE CBC AUTOMATED: CPT | Performed by: INTERNAL MEDICINE

## 2019-06-29 PROCEDURE — 82962 GLUCOSE BLOOD TEST: CPT

## 2019-06-29 PROCEDURE — 99024 POSTOP FOLLOW-UP VISIT: CPT | Performed by: NURSE PRACTITIONER

## 2019-06-29 RX ORDER — TRAMADOL HYDROCHLORIDE 50 MG/1
50 TABLET ORAL EVERY 6 HOURS PRN
Status: DISCONTINUED | OUTPATIENT
Start: 2019-06-29 | End: 2019-06-30 | Stop reason: HOSPADM

## 2019-06-29 RX ORDER — GUAIFENESIN 600 MG/1
1200 TABLET, EXTENDED RELEASE ORAL EVERY 12 HOURS SCHEDULED
Status: DISCONTINUED | OUTPATIENT
Start: 2019-06-29 | End: 2019-06-30 | Stop reason: HOSPADM

## 2019-06-29 RX ORDER — ACETYLCYSTEINE 200 MG/ML
1 SOLUTION ORAL; RESPIRATORY (INHALATION)
Status: DISCONTINUED | OUTPATIENT
Start: 2019-06-29 | End: 2019-06-30 | Stop reason: HOSPADM

## 2019-06-29 RX ORDER — FUROSEMIDE 20 MG/1
20 TABLET ORAL
Status: DISCONTINUED | OUTPATIENT
Start: 2019-06-29 | End: 2019-06-30 | Stop reason: HOSPADM

## 2019-06-29 RX ADMIN — MUPIROCIN 1 APPLICATION: 20 OINTMENT TOPICAL at 08:49

## 2019-06-29 RX ADMIN — POTASSIUM CHLORIDE 40 MEQ: 20 TABLET, EXTENDED RELEASE ORAL at 06:02

## 2019-06-29 RX ADMIN — ACETYLCYSTEINE 1 ML: 200 SOLUTION ORAL; RESPIRATORY (INHALATION) at 19:15

## 2019-06-29 RX ADMIN — IPRATROPIUM BROMIDE AND ALBUTEROL SULFATE 3 ML: .5; 3 SOLUTION RESPIRATORY (INHALATION) at 15:30

## 2019-06-29 RX ADMIN — ASPIRIN 81 MG: 81 TABLET, COATED ORAL at 08:47

## 2019-06-29 RX ADMIN — MELATONIN TAB 5 MG 5 MG: 5 TAB at 20:33

## 2019-06-29 RX ADMIN — POTASSIUM CHLORIDE 40 MEQ: 20 TABLET, EXTENDED RELEASE ORAL at 10:37

## 2019-06-29 RX ADMIN — IPRATROPIUM BROMIDE AND ALBUTEROL SULFATE 3 ML: .5; 3 SOLUTION RESPIRATORY (INHALATION) at 19:15

## 2019-06-29 RX ADMIN — METOPROLOL TARTRATE 12.5 MG: 25 TABLET, FILM COATED ORAL at 08:47

## 2019-06-29 RX ADMIN — AMIODARONE HYDROCHLORIDE 400 MG: 200 TABLET ORAL at 08:47

## 2019-06-29 RX ADMIN — ENOXAPARIN SODIUM 40 MG: 40 INJECTION SUBCUTANEOUS at 17:07

## 2019-06-29 RX ADMIN — IPRATROPIUM BROMIDE AND ALBUTEROL SULFATE 3 ML: .5; 3 SOLUTION RESPIRATORY (INHALATION) at 07:26

## 2019-06-29 RX ADMIN — POTASSIUM CHLORIDE 40 MEQ: 20 TABLET, EXTENDED RELEASE ORAL at 17:29

## 2019-06-29 RX ADMIN — PANTOPRAZOLE SODIUM 40 MG: 40 TABLET, DELAYED RELEASE ORAL at 06:02

## 2019-06-29 RX ADMIN — TRAMADOL HYDROCHLORIDE 50 MG: 50 TABLET, COATED ORAL at 12:10

## 2019-06-29 RX ADMIN — AMIODARONE HYDROCHLORIDE 400 MG: 200 TABLET ORAL at 20:31

## 2019-06-29 RX ADMIN — ACETYLCYSTEINE 1 ML: 200 SOLUTION ORAL; RESPIRATORY (INHALATION) at 11:30

## 2019-06-29 RX ADMIN — GUAIFENESIN 1200 MG: 600 TABLET, EXTENDED RELEASE ORAL at 20:33

## 2019-06-29 RX ADMIN — GUAIFENESIN 600 MG: 600 TABLET, EXTENDED RELEASE ORAL at 08:47

## 2019-06-29 RX ADMIN — INSULIN LISPRO 2 UNITS: 100 INJECTION, SOLUTION INTRAVENOUS; SUBCUTANEOUS at 20:36

## 2019-06-29 RX ADMIN — FUROSEMIDE 20 MG: 20 TABLET ORAL at 08:47

## 2019-06-29 RX ADMIN — ATORVASTATIN CALCIUM 40 MG: 40 TABLET, FILM COATED ORAL at 20:32

## 2019-06-29 RX ADMIN — METOPROLOL TARTRATE 12.5 MG: 25 TABLET, FILM COATED ORAL at 20:34

## 2019-06-29 RX ADMIN — IPRATROPIUM BROMIDE AND ALBUTEROL SULFATE 3 ML: .5; 3 SOLUTION RESPIRATORY (INHALATION) at 11:30

## 2019-06-29 RX ADMIN — FUROSEMIDE 20 MG: 20 TABLET ORAL at 17:08

## 2019-06-30 ENCOUNTER — NURSE TRIAGE (OUTPATIENT)
Dept: CALL CENTER | Facility: HOSPITAL | Age: 69
End: 2019-06-30

## 2019-06-30 VITALS
HEIGHT: 68 IN | WEIGHT: 182.98 LBS | RESPIRATION RATE: 18 BRPM | HEART RATE: 89 BPM | TEMPERATURE: 98.6 F | BODY MASS INDEX: 27.73 KG/M2 | SYSTOLIC BLOOD PRESSURE: 138 MMHG | DIASTOLIC BLOOD PRESSURE: 84 MMHG | OXYGEN SATURATION: 96 %

## 2019-06-30 LAB
ALBUMIN SERPL-MCNC: 3.2 G/DL (ref 3.5–4.8)
ANION GAP SERPL CALCULATED.3IONS-SCNC: 12.6 MMOL/L (ref 10–20)
BUN BLD-MCNC: 35 MG/DL (ref 8–20)
BUN/CREAT SERPL: 23.3 (ref 6.2–20.3)
CALCIUM SPEC-SCNC: 8.7 MG/DL (ref 8.9–10.3)
CHLORIDE SERPL-SCNC: 102 MMOL/L (ref 101–111)
CO2 SERPL-SCNC: 26 MMOL/L (ref 22–32)
CREAT BLD-MCNC: 1.5 MG/DL (ref 0.7–1.2)
DEPRECATED RDW RBC AUTO: 45.1 FL (ref 37–54)
ERYTHROCYTE [DISTWIDTH] IN BLOOD BY AUTOMATED COUNT: 13.4 % (ref 12.3–15.4)
GFR SERPL CREATININE-BSD FRML MDRD: 47 ML/MIN/1.73
GLUCOSE BLD-MCNC: 127 MG/DL (ref 65–99)
GLUCOSE BLDC GLUCOMTR-MCNC: 112 MG/DL (ref 70–105)
GLUCOSE BLDC GLUCOMTR-MCNC: 121 MG/DL (ref 70–105)
HCT VFR BLD AUTO: 30.6 % (ref 37.5–51)
HGB BLD-MCNC: 10.3 G/DL (ref 13–17.7)
MCH RBC QN AUTO: 32.1 PG (ref 26.6–33)
MCHC RBC AUTO-ENTMCNC: 33.5 G/DL (ref 31.5–35.7)
MCV RBC AUTO: 95.8 FL (ref 79–97)
PHOSPHATE SERPL-MCNC: 2.4 MG/DL (ref 2.4–4.7)
PLATELET # BLD AUTO: 177 10*3/MM3 (ref 140–450)
PMV BLD AUTO: 9.6 FL (ref 6–12)
POTASSIUM BLD-SCNC: 3.6 MMOL/L (ref 3.6–5.1)
RBC # BLD AUTO: 3.2 10*6/MM3 (ref 4.14–5.8)
SODIUM BLD-SCNC: 137 MMOL/L (ref 136–144)
WBC NRBC COR # BLD: 5.8 10*3/MM3 (ref 3.4–10.8)

## 2019-06-30 PROCEDURE — 85027 COMPLETE CBC AUTOMATED: CPT | Performed by: INTERNAL MEDICINE

## 2019-06-30 PROCEDURE — 80069 RENAL FUNCTION PANEL: CPT | Performed by: INTERNAL MEDICINE

## 2019-06-30 PROCEDURE — 82962 GLUCOSE BLOOD TEST: CPT

## 2019-06-30 PROCEDURE — 99232 SBSQ HOSP IP/OBS MODERATE 35: CPT | Performed by: NURSE PRACTITIONER

## 2019-06-30 PROCEDURE — 94799 UNLISTED PULMONARY SVC/PX: CPT

## 2019-06-30 RX ORDER — ATORVASTATIN CALCIUM 40 MG/1
40 TABLET, FILM COATED ORAL NIGHTLY
Qty: 30 TABLET | Refills: 3 | Status: SHIPPED | OUTPATIENT
Start: 2019-06-30 | End: 2019-10-21 | Stop reason: SDUPTHER

## 2019-06-30 RX ORDER — POTASSIUM CHLORIDE 750 MG/1
10 CAPSULE, EXTENDED RELEASE ORAL 2 TIMES DAILY
Qty: 60 CAPSULE | Refills: 1 | Status: SHIPPED | OUTPATIENT
Start: 2019-06-30 | End: 2019-10-21 | Stop reason: SDUPTHER

## 2019-06-30 RX ORDER — FUROSEMIDE 20 MG/1
20 TABLET ORAL 2 TIMES DAILY
Qty: 60 TABLET | Refills: 1 | Status: SHIPPED | OUTPATIENT
Start: 2019-06-30 | End: 2019-10-21 | Stop reason: SDUPTHER

## 2019-06-30 RX ORDER — GUAIFENESIN 600 MG/1
1200 TABLET, EXTENDED RELEASE ORAL EVERY 12 HOURS SCHEDULED
Qty: 120 TABLET | Refills: 1 | Status: SHIPPED | OUTPATIENT
Start: 2019-06-30 | End: 2023-01-03

## 2019-06-30 RX ORDER — TRAMADOL HYDROCHLORIDE 50 MG/1
50 TABLET ORAL EVERY 8 HOURS PRN
Qty: 15 TABLET | Refills: 0 | Status: SHIPPED | OUTPATIENT
Start: 2019-06-30 | End: 2019-07-09

## 2019-06-30 RX ADMIN — METOPROLOL TARTRATE 12.5 MG: 25 TABLET, FILM COATED ORAL at 08:25

## 2019-06-30 RX ADMIN — ACETYLCYSTEINE 1 ML: 200 SOLUTION ORAL; RESPIRATORY (INHALATION) at 07:50

## 2019-06-30 RX ADMIN — FUROSEMIDE 20 MG: 20 TABLET ORAL at 08:26

## 2019-06-30 RX ADMIN — GUAIFENESIN 1200 MG: 600 TABLET, EXTENDED RELEASE ORAL at 08:25

## 2019-06-30 RX ADMIN — TRAMADOL HYDROCHLORIDE 50 MG: 50 TABLET, COATED ORAL at 13:25

## 2019-06-30 RX ADMIN — METOPROLOL TARTRATE 12.5 MG: 25 TABLET, FILM COATED ORAL at 11:33

## 2019-06-30 RX ADMIN — POTASSIUM CHLORIDE 40 MEQ: 20 TABLET, EXTENDED RELEASE ORAL at 07:43

## 2019-06-30 RX ADMIN — ASPIRIN 81 MG: 81 TABLET, COATED ORAL at 08:25

## 2019-06-30 RX ADMIN — AMIODARONE HYDROCHLORIDE 400 MG: 200 TABLET ORAL at 08:26

## 2019-06-30 RX ADMIN — POTASSIUM CHLORIDE 40 MEQ: 20 TABLET, EXTENDED RELEASE ORAL at 11:33

## 2019-06-30 RX ADMIN — PANTOPRAZOLE SODIUM 40 MG: 40 TABLET, DELAYED RELEASE ORAL at 06:13

## 2019-06-30 RX ADMIN — IPRATROPIUM BROMIDE AND ALBUTEROL SULFATE 3 ML: .5; 3 SOLUTION RESPIRATORY (INHALATION) at 07:50

## 2019-06-30 RX ADMIN — HYDROCODONE BITARTRATE AND ACETAMINOPHEN 2 TABLET: 5; 325 TABLET ORAL at 04:28

## 2019-07-01 ENCOUNTER — READMISSION MANAGEMENT (OUTPATIENT)
Dept: CALL CENTER | Facility: HOSPITAL | Age: 69
End: 2019-07-01

## 2019-07-02 ENCOUNTER — READMISSION MANAGEMENT (OUTPATIENT)
Dept: CALL CENTER | Facility: HOSPITAL | Age: 69
End: 2019-07-02

## 2019-07-02 NOTE — OUTREACH NOTE
Prep Survey      Responses   Facility patient discharged from?  [facility]   Is patient eligible?  Yes   Discharge diagnosis  CABG x 5, graft from both legs    Does the patient have one of the following disease processes/diagnoses(primary or secondary)?  Cardiothoracic surgery   Does the patient have Home health ordered?  No   Is there a DME ordered?  Yes   What DME was ordered?  3 in 1 BSC from [facility]   Prep survey completed?  Yes          Kiera Welch RN

## 2019-07-02 NOTE — OUTREACH NOTE
CT Surgery Week 1 Survey      Responses   Facility patient discharged from?  [facility]   Does the patient have one of the following disease processes/diagnoses(primary or secondary)?  Cardiothoracic surgery   Is there a successful TCM telephone encounter documented?  No   Week 1 attempt successful?  Yes   Call start time  1301   Call end time  1305   Discharge diagnosis  CABG x 5, graft from both legs    Meds reviewed with patient/caregiver?  Yes   Is the patient having any side effects they believe may be caused by any medication additions or changes?  No   Does the patient have all medications related to this admission filled (includes all antibiotics, pain medications, cardiac medications, etc.)  Yes   Is the patient taking all medications as directed (includes completed medication regime)?  Yes   Does the patient have a primary care provider?   Yes   Does the patient have an appointment scheduled with their C/T surgeon?  Yes   Has the patient kept scheduled appointments due by today?  N/A   Has home health visited the patient within 72 hours of discharge?  N/A   Did the patient receive a copy of their discharge instructions?  Yes   Nursing interventions  Reviewed instructions with patient   What is the patient's perception of their health status since discharge?  Improving   Is the patient/caregiver able to teach back normal signs of recovery?  Constipation, Pain or discomfort at incisional site   Nursing interventions  Reassured on normal signs of recovery   Is the patient /caregiver able to teach back basic post-op care?  No tub bath, swimming, or hot tub until instructed by MD, Take showers only when approved by MD-sponge bathe until then, Keep incision areas clean, dry and protected   Is the patient/caregiver able to teach back signs and symptoms of incisional infection?  Increased redness, swelling or pain at the incisonal site, Increased drainage or bleeding, Incisional warmth, Pus or odor from incision, Fever    Is the patient /caregiver able to teach back the importance of cardiac rehab?  Yes   Is the patient/caregiver able to teach back the hierarchy of who to call/visit for symptoms/problems? PCP, Specialist, Home health nurse, Urgent Care, ED, 911  Yes   Week 1 call completed?  Yes            Mercedez Hope LPN

## 2019-07-08 ENCOUNTER — LAB (OUTPATIENT)
Dept: LAB | Facility: HOSPITAL | Age: 69
End: 2019-07-08

## 2019-07-08 DIAGNOSIS — Z95.1 S/P CABG (CORONARY ARTERY BYPASS GRAFT): ICD-10-CM

## 2019-07-08 LAB
ANION GAP SERPL CALCULATED.3IONS-SCNC: 15.5 MMOL/L (ref 10–20)
BUN BLD-MCNC: 18 MG/DL (ref 8–20)
BUN/CREAT SERPL: 13.8 (ref 6.2–20.3)
CALCIUM SPEC-SCNC: 8.9 MG/DL (ref 8.9–10.3)
CHLORIDE SERPL-SCNC: 101 MMOL/L (ref 101–111)
CO2 SERPL-SCNC: 21 MMOL/L (ref 22–32)
CREAT BLD-MCNC: 1.3 MG/DL (ref 0.7–1.2)
GFR SERPL CREATININE-BSD FRML MDRD: 55 ML/MIN/1.73
GLUCOSE BLD-MCNC: 119 MG/DL (ref 65–99)
POTASSIUM BLD-SCNC: 3.5 MMOL/L (ref 3.6–5.1)
SODIUM BLD-SCNC: 134 MMOL/L (ref 136–144)

## 2019-07-08 PROCEDURE — 80048 BASIC METABOLIC PNL TOTAL CA: CPT

## 2019-07-08 PROCEDURE — 36415 COLL VENOUS BLD VENIPUNCTURE: CPT

## 2019-07-09 ENCOUNTER — TELEPHONE (OUTPATIENT)
Dept: CARDIAC SURGERY | Facility: CLINIC | Age: 69
End: 2019-07-09

## 2019-07-09 NOTE — TELEPHONE ENCOUNTER
Mr. Tinsley called requesting refill for tramadol. States pain is 5/10. Reported this to Marya VALLE she denied refill request and states to inform Mr. Tinsley that he should be taking tylenol prn at this time. I relayed message to Mr. Tinsley he states he will try taking tylenol prn and if pain is still too great he will call back.

## 2019-07-10 ENCOUNTER — READMISSION MANAGEMENT (OUTPATIENT)
Dept: CALL CENTER | Facility: HOSPITAL | Age: 69
End: 2019-07-10

## 2019-07-10 NOTE — OUTREACH NOTE
CT Surgery Week 2 Survey      Responses   Facility patient discharged from?  [facility]   Does the patient have one of the following disease processes/diagnoses(primary or secondary)?  Cardiothoracic surgery   Week 2 attempt successful?  Yes   Call start time  1204   Call end time  1214   Discharge diagnosis  CABG x 5, graft from both legs    Is patient permission given to speak with other caregiver?  Yes   List who call center can speak with  Eleanor, spouse   Person spoke with today (if not patient) and relationship  Spoke with patient and spouse   Meds reviewed with patient/caregiver?  Yes   Is the patient having any side effects they believe may be caused by any medication additions or changes?  No   Does the patient have all medications related to this admission filled (includes all antibiotics, pain medications, cardiac medications, etc.)  Yes   Is the patient taking all medications as directed (includes completed medication regime)?  Yes   Does the patient have a primary care provider?   Yes   Does the patient have an appointment scheduled with their C/T surgeon?  Yes [Thursday Aug 1, 2019 11:30 AM ]   Comments regarding PCP  Dwain [facility] MD. Patient saw yesterday.    Has the patient kept scheduled appointments due by today?  Yes   Comments  Follow Up with TORI Sosa , Thursday Jul 11, 2019 2:45 PM    Has home health visited the patient within 72 hours of discharge?  N/A   Psychosocial issues?  No   Did the patient receive a copy of their discharge instructions?  Yes   Nursing interventions  Reviewed instructions with patient   What is the patient's perception of their health status since discharge?  Improving   Nursing interventions  Nurse provided patient education   Is the patient/caregiver able to teach back normal signs of recovery?  Pain or discomfort at incisional site   Nursing interventions  Reassured on normal signs of recovery   Is the patient /caregiver able to teach back basic  post-op care?  Continue use of incentive spirometry at least 1 week post discharge, Practice 'cough and deep breath', No tub bath, swimming, or hot tub until instructed by MD, Keep incision areas clean, dry and protected, Do not remove steri-strips, Lifting as instructed by MD in discharge instructions, Drive as instructed by MD in discharge instructions, Take showers only when approved by MD-sponge bathe until then   Is the patient/caregiver able to teach back signs and symptoms of incisional infection?  Increased redness, swelling or pain at the incisonal site, Increased drainage or bleeding, Incisional warmth, Pus or odor from incision, Fever [Denies signs of infection]   Is the patient/caregiver able to teach back steps to recovery at home?  Set small, achievable goals for return to baseline health, Rest and rebuild strength, gradually increase activity, Make a list of questions for surgeon's appointment, Eat a well-balance diet   Is the patient /caregiver able to teach back the importance of cardiac rehab?  Yes   Is the patient/caregiver able to teach back the hierarchy of who to call/visit for symptoms/problems? PCP, Specialist, Home health nurse, Urgent Care, ED, 911  Yes   Week 2 call completed?  Yes          Pamela Zeng RN

## 2019-07-11 ENCOUNTER — OFFICE VISIT (OUTPATIENT)
Dept: CARDIOLOGY | Facility: CLINIC | Age: 69
End: 2019-07-11

## 2019-07-11 VITALS
WEIGHT: 181 LBS | BODY MASS INDEX: 27.52 KG/M2 | SYSTOLIC BLOOD PRESSURE: 137 MMHG | OXYGEN SATURATION: 98 % | HEART RATE: 85 BPM | DIASTOLIC BLOOD PRESSURE: 84 MMHG

## 2019-07-11 DIAGNOSIS — R06.02 SHORTNESS OF BREATH: ICD-10-CM

## 2019-07-11 DIAGNOSIS — I10 BENIGN ESSENTIAL HTN: Chronic | ICD-10-CM

## 2019-07-11 DIAGNOSIS — I25.110 CORONARY ARTERY DISEASE INVOLVING NATIVE CORONARY ARTERY OF NATIVE HEART WITH UNSTABLE ANGINA PECTORIS (HCC): Primary | ICD-10-CM

## 2019-07-11 DIAGNOSIS — I20.0 UNSTABLE ANGINA (HCC): ICD-10-CM

## 2019-07-11 DIAGNOSIS — Z95.1 S/P CABG (CORONARY ARTERY BYPASS GRAFT): ICD-10-CM

## 2019-07-11 PROCEDURE — 93000 ELECTROCARDIOGRAM COMPLETE: CPT | Performed by: NURSE PRACTITIONER

## 2019-07-11 PROCEDURE — 99213 OFFICE O/P EST LOW 20 MIN: CPT | Performed by: NURSE PRACTITIONER

## 2019-07-11 RX ORDER — TAMSULOSIN HYDROCHLORIDE 0.4 MG/1
1 CAPSULE ORAL
Refills: 0 | COMMUNITY
Start: 2019-07-09 | End: 2023-01-03

## 2019-07-12 NOTE — PROGRESS NOTES
[facility] CARDIOLOGY      REASON FOR FOLLOW-UP:  Chest pain  Multivessel coronary artery disease status post CABG        Chief Complaint   Patient presents with   • Coronary Artery Disease     S/P recent CABG   • Hypertension         Dear Dr. [facility]    History of Present Illness   69 y.o. male who presented to [facility] with c/o dizziness, SOA, and diaphoresis.  He ruled out for acute coronary syndrome.  Cardiology was consulted and pt had stress test that revealed prior MI and miles-infarct ischemia.  Pt had subsequent cardiac cath revealing MV CAD, EF 50%.   On 6/24/2019, he underwent CABG x5.  He was extubated in a timely manner but required pressor support initially.  Creatinine was noted to be 2.1 on POD#1.  Creatinine peaked at 2.7 on POD#3.  Dr. Baker was consulted to manage his MAURY/CKD.  At discharge, his creatinine was 1.5.  His postop course was complicated by hypoxia and the inability to clear sputum.  By POD#6, he was ready for discharge.  He was on room air, up walking around, and his sputum was manageable.  He presents today in follow-up for that hospital stay.    Today, the patient reports that he feels well.  He does have some residual sternal wound discomfort, primarily in the evening.  He denies any shortness of breath at rest, dyspnea with exertion.  His wounds are all well approximated with no erythema or drainage noted.  He has been afebrile.         Assessment:  Multivessel coronary artery disease status post CABG 6/24/2019  Acute renal failure-improved  History of DVT/PE  Hypertension        Plan:  Continue current medical regimen  Blood pressure well controlled  Follow-up with cardiothoracic surgeon as scheduled  Follow-up our office 1 month or sooner if needed  To discuss cardiac rehab at that time        The following portions of the patient's history were reviewed and updated as appropriate: allergies, current medications, past family history, past medical history, past social history,  past surgical history and problem list.    REVIEW OF SYSTEMS:    Review of Systems   Musculoskeletal:        Surgical site soreness   All other systems reviewed and are negative.      Vitals:    07/11/19 1541   BP: 137/84   Pulse: 85   SpO2: 98%         PHYSICAL EXAM:    General Appearance: Well-developed, well-nourished 69-year-old  male in no acute distress alert, cooperative, no distress, appears stated age  Head:  Normocephalic, without obvious abnormality, atraumatic  Eyes:  PERRL, conjunctiva/corneas clear, EOM's intact     Neck:  Supple,  no adenopathy, no bruit      Lungs:   Clear to auscultation bilaterally, respirations unlabored  Chest wall:  No tenderness, sternal wound well approximated with no erythema or drainage noted  Heart:  Regular rate and rhythm, S1 and S2 normal, no murmur, rub   or gallop  Abdomen:  Soft, non-tender, bowel sounds active all four quadrants,  no masses, no hepatomegaly, no splenomegaly  Extremities:  Extremities normal, no cyanosis or edema  Pulses: 2+ and symmetric all extremities  Skin:  No rashes or lesions  Neurologic:   Alert and oriented, no focal deficits    EKG with sinus rhythm, rate 85 bpm.  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  Normal QT and QTc intervals.      Past Medical History:   Diagnosis Date   • Abnormal nuclear stress test 6/22/2019   • Arthritis    • Chronic deep vein thrombosis (DVT) of left lower extremity (CMS/HCC)    • Chronic kidney disease (CKD), stage II (mild)    • Coronary artery disease    • DVT (deep venous thrombosis) (CMS/HCC)    • Hypertension    • BHATTI (nonalcoholic steatohepatitis) 06/2019   • Unstable angina (CMS/HCC) 6/22/2019       Past Surgical History:   Procedure Laterality Date   • CARDIAC CATHETERIZATION N/A 6/22/2019    Procedure: LEFT HEART CATH with possible PCI;  Surgeon: Enmanuel Wild MD;  Location: [facility] INVASIVE LOCATION;  Service: Cardiovascular   • COLON SURGERY     • CORONARY ARTERY BYPASS GRAFT N/A  6/24/2019    Procedure: CABG;  Surgeon: Jose Angel López MD;  Location: [facility];  Service: Cardiothoracic   • LIVER BIOPSY  06/2019         Current Outpatient Medications:   •  aspirin 81 MG chewable tablet, Chew 81 mg Daily., Disp: , Rfl:   •  atorvastatin (LIPITOR) 40 MG tablet, Take 1 tablet by mouth Every Night., Disp: 30 tablet, Rfl: 3  •  cholecalciferol (VITAMIN D3) 1000 units tablet, Take 1,000 Units by mouth Daily., Disp: , Rfl:   •  diclofenac (VOLTAREN) 1 % gel gel, Apply 4 g topically to the appropriate area as directed 3 (Three) Times a Day As Needed. Apply 1 gm to feet as needed, Disp: , Rfl:   •  furosemide (LASIX) 20 MG tablet, Take 1 tablet by mouth 2 (Two) Times a Day., Disp: 60 tablet, Rfl: 1  •  guaiFENesin (MUCINEX) 600 MG 12 hr tablet, Take 2 tablets by mouth Every 12 (Twelve) Hours., Disp: 120 tablet, Rfl: 1  •  metoprolol tartrate (LOPRESSOR) 25 MG tablet, Take 1 tablet by mouth Every 12 (Twelve) Hours., Disp: 60 tablet, Rfl: 3  •  Omega-3 Fatty Acids (FISH OIL) 1000 MG capsule capsule, Take  by mouth Daily With Breakfast., Disp: , Rfl:   •  potassium chloride (MICRO-K) 10 MEQ CR capsule, Take 1 capsule by mouth 2 (Two) Times a Day. With furosemide, Disp: 60 capsule, Rfl: 1  •  triazolam (HALCION) 0.25 MG tablet, Take 0.25 mg by mouth At Night As Needed for Sleep. Triazolam 0.25mg 1 tab - 2 tab at bedtime PRN, Disp: , Rfl:   •  tamsulosin (FLOMAX) 0.4 MG capsule 24 hr capsule, Take 1 capsule by mouth every night at bedtime., Disp: , Rfl: 0    No Known Allergies    Family History   Problem Relation Age of Onset   • Heart disease Father        Social History     Tobacco Use   • Smoking status: Never Smoker   • Smokeless tobacco: Never Used   Substance Use Topics   • Alcohol use: No     Frequency: Never       Physical Exam        TORI Sosa  07/22/19  1:57 PM

## 2019-07-22 PROBLEM — Z95.1 S/P CABG (CORONARY ARTERY BYPASS GRAFT): Status: ACTIVE | Noted: 2019-06-24

## 2019-08-01 ENCOUNTER — TELEPHONE (OUTPATIENT)
Dept: CARDIAC REHAB | Facility: HOSPITAL | Age: 69
End: 2019-08-01

## 2019-08-01 NOTE — TELEPHONE ENCOUNTER
Spouse, Eleanor, returned our phone call. Patient has been exercising at home, walking. Will continue to do so. Not interested in cardiac rehab at time. Ready to go back to work. Has follow up appointment with surgeon next week. ARVIND Melo

## 2019-08-08 ENCOUNTER — OFFICE VISIT (OUTPATIENT)
Dept: CARDIAC SURGERY | Facility: CLINIC | Age: 69
End: 2019-08-08

## 2019-08-08 VITALS
OXYGEN SATURATION: 98 % | TEMPERATURE: 97.8 F | WEIGHT: 180.6 LBS | DIASTOLIC BLOOD PRESSURE: 85 MMHG | SYSTOLIC BLOOD PRESSURE: 128 MMHG | HEIGHT: 68 IN | BODY MASS INDEX: 27.37 KG/M2 | HEART RATE: 80 BPM | RESPIRATION RATE: 20 BRPM

## 2019-08-08 DIAGNOSIS — Z95.1 S/P CABG (CORONARY ARTERY BYPASS GRAFT): Primary | ICD-10-CM

## 2019-08-08 PROCEDURE — 99024 POSTOP FOLLOW-UP VISIT: CPT | Performed by: NURSE PRACTITIONER

## 2019-08-08 NOTE — PROGRESS NOTES
"CARDIOVASCULAR SURGERY FOLLOW-UP PROGRESS NOTE  Chief Complaint: Post-op Follow Up        HPI:   Dear Dr. [facility], Dwain Bello MD and colleagues:    It was nice to see Chris Tinsley in follow up today after cardiac surgery.  As you know, he is a 69 y.o. male with MV CAD and CKD stage 2-3 who underwent CABG at [facility] by Dr. López on 6/24/2019. His postop course was complicated by MAURY on CKD.  His creatinine peaked at 2.7 and Dr. Baker was consulted to assist in his care.  He has seen Dr. Baker in follow up and his diuretics have been decreased to daily.   He comes in today complaining of nothing.  His activity level has been good.   He is motivated to get back to his usual routine.  He has seen Dr. Wild and is not doing cardiac rehab because he is physically active and exercising at home.  His wife was with him today for his appt.    Physical Exam:         /85 (BP Location: Right arm, Patient Position: Sitting, Cuff Size: Adult)   Pulse 80   Temp 97.8 °F (36.6 °C) (Oral)   Resp 20   Ht 172.7 cm (68\")   Wt 81.9 kg (180 lb 9.6 oz)   SpO2 98%   BMI 27.46 kg/m²   Heart:  regular rate and rhythm  Lungs:  clear to auscultation bilaterally  Extremities:  no edema  Incision(s):  mid chest healing well, bilateral leg healing well, sternum stable    Assessment/Plan:     S/P CABG. Overall, he is doing well.  He will normalize his activity and continue to follow with Dr. Wild.    Postop MAURY on CKD--resolved, continues to follow with Dr. Baker.    Follow-up with CT surgery prn.    No restrictions of activity.      Thank you for allowing me to participate in the care of your patient.    Regards,  Kassidy Wilde, APRN      "

## 2019-08-14 ENCOUNTER — OFFICE VISIT (OUTPATIENT)
Dept: CARDIOLOGY | Facility: CLINIC | Age: 69
End: 2019-08-14

## 2019-08-14 VITALS
WEIGHT: 180 LBS | DIASTOLIC BLOOD PRESSURE: 56 MMHG | OXYGEN SATURATION: 97 % | HEART RATE: 88 BPM | RESPIRATION RATE: 18 BRPM | SYSTOLIC BLOOD PRESSURE: 132 MMHG | HEIGHT: 68 IN | BODY MASS INDEX: 27.28 KG/M2

## 2019-08-14 DIAGNOSIS — E78.2 MIXED HYPERLIPIDEMIA: ICD-10-CM

## 2019-08-14 DIAGNOSIS — I10 ESSENTIAL HYPERTENSION: ICD-10-CM

## 2019-08-14 DIAGNOSIS — I25.810 CORONARY ARTERY DISEASE INVOLVING CORONARY BYPASS GRAFT OF NATIVE HEART WITHOUT ANGINA PECTORIS: Primary | ICD-10-CM

## 2019-08-14 PROCEDURE — 99214 OFFICE O/P EST MOD 30 MIN: CPT | Performed by: INTERNAL MEDICINE

## 2019-08-14 RX ORDER — LORATADINE 10 MG/1
CAPSULE, LIQUID FILLED ORAL
COMMUNITY
End: 2022-03-16 | Stop reason: SDUPTHER

## 2019-08-14 NOTE — PROGRESS NOTES
Subjective:     Encounter Date:08/14/2019      Patient ID: Chris Tinsley is a 69 y.o. male.    Chief Complaint:      Dear Dr. Peter,    It is my pleasure seeing patient Chris Tinsley the office today. He is a pleasant 69 y.o. male who presented to [facility] with c/o dizziness, SOA, and diaphoresis.  He ruled out for acute coronary syndrome.  Cardiology was consulted and pt had stress test that revealed prior MI and miles-infarct ischemia.  Pt had subsequent cardiac cath revealing MV CAD, EF 50%.   On 6/24/2019, he underwent CABG x5.  He was extubated in a timely manner but required pressor support initially.  Creatinine was noted to be 2.1 on POD#1.  Creatinine peaked at 2.7 on POD#3.  Dr. Baker was consulted to manage his MAURY/CKD.  At discharge, his creatinine was 1.5.  His postop course was complicated by hypoxia and the inability to clear sputum.  By POD#6, he was ready for discharge.  He was on room air, up walking around, and his sputum was manageable.  He presents today in follow-up for that hospital stay.     Today, the patient reports that he feels well.  He denies any shortness of breath at rest, dyspnea with exertion.  His wounds are all well approximated with no erythema or drainage noted.  He has been afebrile.    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    No Known  Allergies      Current Outpatient Medications:   •  aspirin 81 MG chewable tablet, Chew 81 mg Daily., Disp: , Rfl:   •  atorvastatin (LIPITOR) 40 MG tablet, Take 1 tablet by mouth Every Night., Disp: 30 tablet, Rfl: 3  •  cholecalciferol (VITAMIN D3) 1000 units tablet, Take 1,000 Units by mouth Daily., Disp: , Rfl:   •  diclofenac (VOLTAREN) 1 % gel gel, Apply 4 g topically to the appropriate area as directed 3 (Three) Times a Day As Needed. Apply 1 gm to feet as needed, Disp: , Rfl:   •  furosemide (LASIX) 20 MG tablet, Take 1 tablet by mouth 2 (Two) Times a Day. (Patient taking differently: Take 20 mg by mouth Daily.), Disp: 60 tablet, Rfl: 1  •  guaiFENesin (MUCINEX) 600 MG 12 hr tablet, Take 2 tablets by mouth Every 12 (Twelve) Hours., Disp: 120 tablet, Rfl: 1  •  Loratadine 10 MG capsule, Take  by mouth., Disp: , Rfl:   •  metoprolol tartrate (LOPRESSOR) 25 MG tablet, Take 1 tablet by mouth Every 12 (Twelve) Hours., Disp: 60 tablet, Rfl: 3  •  Omega-3 Fatty Acids (FISH OIL) 1000 MG capsule capsule, Take  by mouth Daily With Breakfast., Disp: , Rfl:   •  potassium chloride (MICRO-K) 10 MEQ CR capsule, Take 1 capsule by mouth 2 (Two) Times a Day. With furosemide (Patient taking differently: Take 10 mEq by mouth Daily. With furosemide), Disp: 60 capsule, Rfl: 1  •  tamsulosin (FLOMAX) 0.4 MG capsule 24 hr capsule, Take 1 capsule by mouth every night at bedtime., Disp: , Rfl: 0  •  triazolam (HALCION) 0.25 MG tablet, Take 0.25 mg by mouth At Night As Needed for Sleep. Triazolam 0.25mg 1 tab - 2 tab at bedtime PRN, Disp: , Rfl:     Family History   Problem Relation Age of Onset   • Heart disease Father        Past Medical History:   Diagnosis Date   • Abnormal nuclear stress test 6/22/2019   • Arthritis    • Chronic deep vein thrombosis (DVT) of left lower extremity (CMS/HCC)    • Chronic kidney disease (CKD), stage II (mild)    • Coronary artery disease    • DVT (deep venous thrombosis) (CMS/HCC)    • Hypertension     • BHATTI (nonalcoholic steatohepatitis) 06/2019   • Unstable angina (CMS/HCC) 6/22/2019       Past Surgical History:   Procedure Laterality Date   • CARDIAC CATHETERIZATION N/A 6/22/2019    Procedure: LEFT HEART CATH with possible PCI;  Surgeon: Enmanuel Wild MD;  Location: [facility] CATH INVASIVE LOCATION;  Service: Cardiovascular   • COLON SURGERY     • CORONARY ARTERY BYPASS GRAFT N/A 6/24/2019    Procedure: CABG;  Surgeon: Jose Angel López MD;  Location: [facility] CVOR;  Service: Cardiothoracic   • LIVER BIOPSY  06/2019       Social History     Socioeconomic History   • Marital status:      Spouse name: Not on file   • Number of children: Not on file   • Years of education: Not on file   • Highest education level: Not on file   Tobacco Use   • Smoking status: Never Smoker   • Smokeless tobacco: Never Used   Substance and Sexual Activity   • Alcohol use: No     Frequency: Never   • Drug use: No   • Sexual activity: Defer       Review of Systems   Constitution: Negative for chills, decreased appetite and malaise/fatigue.   HENT: Negative for congestion.    Eyes: Negative for blurred vision and double vision.   Cardiovascular: Negative for chest pain, dyspnea on exertion, irregular heartbeat, leg swelling, near-syncope, orthopnea, palpitations, paroxysmal nocturnal dyspnea and syncope.   Respiratory: Negative for cough and shortness of breath.    Hematologic/Lymphatic: Negative for adenopathy. Does not bruise/bleed easily.   Skin: Negative for rash.   Gastrointestinal: Negative for bloating and abdominal pain.   Neurological: Negative for dizziness and focal weakness.            Objective:         Vitals:    08/14/19 1458   BP: 132/56   Pulse: 88   Resp: 18   SpO2: 97%       Physical Exam   Constitutional: He is oriented to person, place, and time. He appears well-developed and well-nourished.   HENT:   Head: Normocephalic and atraumatic.   Eyes: Conjunctivae are normal. Pupils are equal, round, and  reactive to light.   Neck: Normal range of motion. Neck supple. No thyromegaly present.   Cardiovascular: Normal rate, regular rhythm, S1 normal, S2 normal and intact distal pulses.   Bypass site looks good with no hematoma bleeding or infection   Pulmonary/Chest: Effort normal and breath sounds normal.   Abdominal: Soft. Bowel sounds are normal.   Musculoskeletal: He exhibits no edema.   Neurological: He is alert and oriented to person, place, and time.   Skin: Skin is warm.   Nursing note and vitals reviewed.

## 2019-09-19 ENCOUNTER — TRANSCRIBE ORDERS (OUTPATIENT)
Dept: ADMINISTRATIVE | Facility: HOSPITAL | Age: 69
End: 2019-09-19

## 2019-09-19 ENCOUNTER — TELEPHONE (OUTPATIENT)
Dept: CARDIOLOGY | Facility: CLINIC | Age: 69
End: 2019-09-19

## 2019-09-19 ENCOUNTER — LAB (OUTPATIENT)
Dept: LAB | Facility: HOSPITAL | Age: 69
End: 2019-09-19

## 2019-09-19 DIAGNOSIS — N18.2 CHRONIC KIDNEY DISEASE, STAGE II (MILD): ICD-10-CM

## 2019-09-19 DIAGNOSIS — I25.810 CORONARY ARTERY DISEASE INVOLVING CORONARY BYPASS GRAFT OF NATIVE HEART WITHOUT ANGINA PECTORIS: ICD-10-CM

## 2019-09-19 DIAGNOSIS — N18.2 CHRONIC KIDNEY DISEASE, STAGE II (MILD): Primary | ICD-10-CM

## 2019-09-19 LAB
ALBUMIN SERPL-MCNC: 3.9 G/DL (ref 3.5–4.8)
ALBUMIN/GLOB SERPL: 1.3 G/DL (ref 1–1.7)
ALP SERPL-CCNC: 70 U/L (ref 32–91)
ALT SERPL W P-5'-P-CCNC: 18 U/L (ref 17–63)
ANION GAP SERPL CALCULATED.3IONS-SCNC: 12.5 MMOL/L (ref 5–15)
ARTICHOKE IGE QN: 64 MG/DL (ref 0–100)
AST SERPL-CCNC: 24 U/L (ref 15–41)
BASOPHILS # BLD AUTO: 0 10*3/MM3 (ref 0–0.2)
BASOPHILS NFR BLD AUTO: 0.2 % (ref 0–1.5)
BILIRUB SERPL-MCNC: 0.9 MG/DL (ref 0.3–1.2)
BUN BLD-MCNC: 16 MG/DL (ref 8–20)
BUN/CREAT SERPL: 12.3 (ref 6.2–20.3)
CALCIUM SPEC-SCNC: 8.9 MG/DL (ref 8.9–10.3)
CHLORIDE SERPL-SCNC: 101 MMOL/L (ref 101–111)
CHOLEST SERPL-MCNC: 120 MG/DL
CO2 SERPL-SCNC: 27 MMOL/L (ref 22–32)
CREAT BLD-MCNC: 1.3 MG/DL (ref 0.7–1.2)
DEPRECATED RDW RBC AUTO: 44.6 FL (ref 37–54)
EOSINOPHIL # BLD AUTO: 0.1 10*3/MM3 (ref 0–0.4)
EOSINOPHIL NFR BLD AUTO: 2.5 % (ref 0.3–6.2)
ERYTHROCYTE [DISTWIDTH] IN BLOOD BY AUTOMATED COUNT: 14 % (ref 12.3–15.4)
GFR SERPL CREATININE-BSD FRML MDRD: 55 ML/MIN/1.73
GLOBULIN UR ELPH-MCNC: 3.1 GM/DL (ref 2.5–3.8)
GLUCOSE BLD-MCNC: 100 MG/DL (ref 65–99)
HCT VFR BLD AUTO: 39.3 % (ref 37.5–51)
HDLC SERPL QL: 3.64
HDLC SERPL-MCNC: 33 MG/DL
HGB BLD-MCNC: 13.5 G/DL (ref 13–17.7)
LDLC/HDLC SERPL: 1.83 {RATIO}
LYMPHOCYTES # BLD AUTO: 1.1 10*3/MM3 (ref 0.7–3.1)
LYMPHOCYTES NFR BLD AUTO: 22.1 % (ref 19.6–45.3)
MCH RBC QN AUTO: 31.1 PG (ref 26.6–33)
MCHC RBC AUTO-ENTMCNC: 34.3 G/DL (ref 31.5–35.7)
MCV RBC AUTO: 90.6 FL (ref 79–97)
MONOCYTES # BLD AUTO: 0.5 10*3/MM3 (ref 0.1–0.9)
MONOCYTES NFR BLD AUTO: 9.7 % (ref 5–12)
NEUTROPHILS # BLD AUTO: 3.3 10*3/MM3 (ref 1.7–7)
NEUTROPHILS NFR BLD AUTO: 65.5 % (ref 42.7–76)
NRBC BLD AUTO-RTO: 0 /100 WBC (ref 0–0.2)
PLATELET # BLD AUTO: 172 10*3/MM3 (ref 140–450)
PMV BLD AUTO: 8.7 FL (ref 6–12)
POTASSIUM BLD-SCNC: 3.5 MMOL/L (ref 3.6–5.1)
PROT SERPL-MCNC: 7 G/DL (ref 6.1–7.9)
RBC # BLD AUTO: 4.33 10*6/MM3 (ref 4.14–5.8)
SODIUM BLD-SCNC: 137 MMOL/L (ref 136–144)
TRIGL SERPL-MCNC: 133 MG/DL
VLDLC SERPL-MCNC: 26.6 MG/DL
WBC NRBC COR # BLD: 5.1 10*3/MM3 (ref 3.4–10.8)

## 2019-09-19 PROCEDURE — 80053 COMPREHEN METABOLIC PANEL: CPT

## 2019-09-19 PROCEDURE — 85025 COMPLETE CBC W/AUTO DIFF WBC: CPT

## 2019-09-19 PROCEDURE — 80061 LIPID PANEL: CPT

## 2019-09-19 NOTE — TELEPHONE ENCOUNTER
Called and informed the Pt per Dr. Vargas his labs look good. Pt stated understanding.       ----- Message from Enmanuel Wild MD sent at 9/19/2019 10:10 AM EDT -----  Look good

## 2019-10-01 ENCOUNTER — TELEPHONE (OUTPATIENT)
Dept: CARDIOLOGY | Facility: CLINIC | Age: 69
End: 2019-10-01

## 2019-10-01 RX ORDER — LISINOPRIL 5 MG/1
5 TABLET ORAL DAILY
Qty: 30 TABLET | Refills: 6 | Status: SHIPPED | OUTPATIENT
Start: 2019-10-01 | End: 2020-03-20

## 2019-10-01 NOTE — TELEPHONE ENCOUNTER
Pt called to report his BP readings. The readings are as follows:  9/25 PM- 131/81, p-78, 9/26 AM- 144/98, p-77, 9/26 PM- 131/80, p-64, 9/27 /92, p-74, 9/27PM- 132/80, p-70, 9/28 /94, p-78, 9/28 PM- 132/85, p-67, 9/29 /90, p-77, 9/29 /83, p-73, 9/30 /92, p-75.   He currently takes Metoprolol Tartrate 25mg- 1 po BID.     Per Dr. Vargas start Lisinopril 5mg- 1 po daily. I called and informed the Pt and he stated understanding. Rx sent.

## 2019-10-21 RX ORDER — POTASSIUM CHLORIDE 750 MG/1
10 CAPSULE, EXTENDED RELEASE ORAL 2 TIMES DAILY
Qty: 60 CAPSULE | Refills: 1 | Status: SHIPPED | OUTPATIENT
Start: 2019-10-21 | End: 2020-02-17

## 2019-10-21 RX ORDER — FUROSEMIDE 20 MG/1
20 TABLET ORAL 2 TIMES DAILY
Qty: 60 TABLET | Refills: 1 | Status: SHIPPED | OUTPATIENT
Start: 2019-10-21 | End: 2020-02-17

## 2019-10-21 RX ORDER — ATORVASTATIN CALCIUM 40 MG/1
40 TABLET, FILM COATED ORAL NIGHTLY
Qty: 30 TABLET | Refills: 1 | Status: SHIPPED | OUTPATIENT
Start: 2019-10-21 | End: 2019-12-19

## 2019-10-21 NOTE — TELEPHONE ENCOUNTER
Pt called requesting refills on Metoprolol tart, K+, Atorvastatin, Furosemide, Doses were confirmed with the Pt. Rx sent.

## 2019-11-06 ENCOUNTER — OFFICE VISIT (OUTPATIENT)
Dept: CARDIOLOGY | Facility: CLINIC | Age: 69
End: 2019-11-06

## 2019-11-06 VITALS
WEIGHT: 180 LBS | OXYGEN SATURATION: 98 % | RESPIRATION RATE: 18 BRPM | HEIGHT: 68 IN | DIASTOLIC BLOOD PRESSURE: 79 MMHG | HEART RATE: 89 BPM | SYSTOLIC BLOOD PRESSURE: 118 MMHG | BODY MASS INDEX: 27.28 KG/M2

## 2019-11-06 DIAGNOSIS — I25.10 CORONARY ARTERY DISEASE INVOLVING NATIVE CORONARY ARTERY OF NATIVE HEART WITHOUT ANGINA PECTORIS: ICD-10-CM

## 2019-11-06 DIAGNOSIS — I10 ESSENTIAL HYPERTENSION: Primary | ICD-10-CM

## 2019-11-06 DIAGNOSIS — E78.2 MIXED HYPERLIPIDEMIA: ICD-10-CM

## 2019-11-06 PROCEDURE — 99214 OFFICE O/P EST MOD 30 MIN: CPT | Performed by: INTERNAL MEDICINE

## 2019-11-06 PROCEDURE — 93000 ELECTROCARDIOGRAM COMPLETE: CPT | Performed by: INTERNAL MEDICINE

## 2019-11-06 NOTE — PROGRESS NOTES
Cardiology Office Visit      Encounter Date:  11/06/2019    Patient ID:   Chris Tinsley is a 69 y.o. male.    Reason For Followup:  Coronary artery disease  Hypertension  Lipidemia  Chronic renal insufficiency    Brief Clinical History:  Dear Dr. Peter, Dwain Bello MD    I had the pleasure of seeing Chris Tinsley today. As you are well aware, this is a 69 y.o. male who presented to [facility] with c/o dizziness, SOA, and diaphoresis.  He ruled out for acute coronary syndrome.  Cardiology was consulted and pt had stress test that revealed prior MI and miles-infarct ischemia.  Pt had subsequent cardiac cath revealing MV CAD, EF 50%.   On 6/24/2019, he underwent CABG x5.  He was extubated in a timely manner but required pressor support initially.  Creatinine was noted to be 2.1 on POD#1.  Creatinine peaked at 2.7 on POD#3.  Dr. Baker was consulted to manage his MAURY/CKD.  At discharge, his creatinine was 1.5.  His postop course was complicated by hypoxia and the inability to clear sputum.  By POD#6, he was ready for discharge.  He was on room air, up walking around, and his sputum was manageable.  He presents today in follow-up for that hospital stay.       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%  Hypertension  Lipidemia  Acute on chronic renal insufficiency creatinine is back to baseline/had and is 1.3    Recommendations:    Continue aggressive risk factor modification  Medications reviewed with the patient  Wean off and discontinue Lasix and potassium  Test results and labs discussed with the patient  Follow-up in office in 6 months  Objective:    Vitals:  Vitals:    11/06/19 1251   BP: 118/79   BP Location: Left arm   Pulse: 89   Resp: 18   SpO2: 98%   Weight: 81.6  "kg (180 lb)   Height: 172.7 cm (68\")       Physical Exam:    General: Alert, cooperative, no distress, appears stated age  Head:  Normocephalic, atraumatic, mucous membranes moist  Eyes:  Conjunctiva/corneas clear, EOM's intact     Neck:  Supple,  no adenopathy;      Lungs: Clear to auscultation bilaterally, no wheezes rhonchi rales are noted  Chest wall: No tenderness/midline scar  Heart::  Regular rate and rhythm, S1 and S2 normal, no murmur, rub or gallop  Abdomen: Soft, non-tender, nondistended bowel sounds active  Extremities: No cyanosis, clubbing, or edema  Pulses: 2+ and symmetric all extremities  Skin:  No rashes or lesions  Neuro/psych: A&O x3. CN II through XII are grossly intact with appropriate affect      Allergies:  No Known Allergies    Medication Review:     Current Outpatient Medications:   •  aspirin 81 MG chewable tablet, Chew 81 mg Daily., Disp: , Rfl:   •  atorvastatin (LIPITOR) 40 MG tablet, Take 1 tablet by mouth Every Night., Disp: 30 tablet, Rfl: 1  •  cholecalciferol (VITAMIN D3) 1000 units tablet, Take 1,000 Units by mouth Daily., Disp: , Rfl:   •  diclofenac (VOLTAREN) 1 % gel gel, Apply 4 g topically to the appropriate area as directed 3 (Three) Times a Day As Needed. Apply 1 gm to feet as needed, Disp: , Rfl:   •  furosemide (LASIX) 20 MG tablet, Take 1 tablet by mouth 2 (Two) Times a Day., Disp: 60 tablet, Rfl: 1  •  guaiFENesin (MUCINEX) 600 MG 12 hr tablet, Take 2 tablets by mouth Every 12 (Twelve) Hours., Disp: 120 tablet, Rfl: 1  •  lisinopril (PRINIVIL,ZESTRIL) 5 MG tablet, Take 1 tablet by mouth Daily., Disp: 30 tablet, Rfl: 6  •  Loratadine 10 MG capsule, Take  by mouth., Disp: , Rfl:   •  metoprolol tartrate (LOPRESSOR) 25 MG tablet, Take 1 tablet by mouth Every 12 (Twelve) Hours., Disp: 60 tablet, Rfl: 1  •  Omega-3 Fatty Acids (FISH OIL) 1000 MG capsule capsule, Take  by mouth Daily With Breakfast., Disp: , Rfl:   •  potassium chloride (MICRO-K) 10 MEQ CR capsule, Take 1 " capsule by mouth 2 (Two) Times a Day. With furosemide, Disp: 60 capsule, Rfl: 1  •  tamsulosin (FLOMAX) 0.4 MG capsule 24 hr capsule, Take 1 capsule by mouth every night at bedtime., Disp: , Rfl: 0  •  triazolam (HALCION) 0.25 MG tablet, Take 0.25 mg by mouth At Night As Needed for Sleep. Triazolam 0.25mg 1 tab - 2 tab at bedtime PRN, Disp: , Rfl:     Family History:  Family History   Problem Relation Age of Onset   • Heart disease Father        Past Medical History:  Past Medical History:   Diagnosis Date   • Abnormal nuclear stress test 6/22/2019   • Arthritis    • Chronic deep vein thrombosis (DVT) of left lower extremity (CMS/HCC)    • Chronic kidney disease (CKD), stage II (mild)    • Coronary artery disease    • DVT (deep venous thrombosis) (CMS/HCC)    • Hypertension    • BHATTI (nonalcoholic steatohepatitis) 06/2019   • Unstable angina (CMS/HCC) 6/22/2019       Past surgical History:  Past Surgical History:   Procedure Laterality Date   • CARDIAC CATHETERIZATION N/A 6/22/2019    Procedure: LEFT HEART CATH with possible PCI;  Surgeon: Enmanuel Wild MD;  Location: [facility] CATH INVASIVE LOCATION;  Service: Cardiovascular   • COLON SURGERY     • CORONARY ARTERY BYPASS GRAFT N/A 6/24/2019    Procedure: CABG;  Surgeon: Jose Angel López MD;  Location: [facility] CVOR;  Service: Cardiothoracic   • LIVER BIOPSY  06/2019       Social History:  Social History     Socioeconomic History   • Marital status:      Spouse name: Not on file   • Number of children: Not on file   • Years of education: Not on file   • Highest education level: Not on file   Tobacco Use   • Smoking status: Never Smoker   • Smokeless tobacco: Never Used   Substance and Sexual Activity   • Alcohol use: No     Frequency: Never   • Drug use: No   • Sexual activity: Defer       Review of Systems:  The following systems were reviewed as they relate to the cardiovascular system: Constitutional, Eyes, ENT, Cardiovascular, Respiratory,  Gastrointestinal, Integumentary, Neurological, Psychiatric, Hematologic, Endocrine, Musculoskeletal, and Genitourinary. The pertinent cardiovascular findings are reported above with all other cardiovascular points within those systems being negative.    Diagnostic Study Review:     Current Electrocardiogram:    ECG 12 Lead  Date/Time: 11/6/2019 4:51 PM  Performed by: Enmanuel Wild MD  Authorized by: Enmanuel Wild MD   Comparison: compared with previous ECG   Similar to previous ECG  Rhythm: sinus rhythm  Rate: normal  BPM: 89  Conduction: conduction normal  Q waves: II, III and aVF    QRS axis: normal  Other findings: non-specific ST-T wave changes    Clinical impression: abnormal EKG  Comments: Inferior Q waves suggestive of prior inferior wall microinfarction              NOTE: The following portions of the patient's history were reviewed and updated this visit as appropriate: allergies, current medications, past family history, past medical history, past social history, past surgical history and problem list.

## 2019-12-19 RX ORDER — ATORVASTATIN CALCIUM 40 MG/1
TABLET, FILM COATED ORAL
Qty: 90 TABLET | Refills: 1 | Status: SHIPPED | OUTPATIENT
Start: 2019-12-19 | End: 2020-06-12

## 2020-02-17 RX ORDER — FUROSEMIDE 20 MG/1
TABLET ORAL
Qty: 180 TABLET | Refills: 2 | Status: SHIPPED | OUTPATIENT
Start: 2020-02-17 | End: 2021-03-10

## 2020-02-17 RX ORDER — POTASSIUM CHLORIDE 750 MG/1
CAPSULE, EXTENDED RELEASE ORAL
Qty: 180 CAPSULE | Refills: 2 | Status: SHIPPED | OUTPATIENT
Start: 2020-02-17 | End: 2021-03-10

## 2020-03-20 RX ORDER — LISINOPRIL 5 MG/1
TABLET ORAL
Qty: 90 TABLET | Refills: 2 | Status: SHIPPED | OUTPATIENT
Start: 2020-03-20 | End: 2020-12-03 | Stop reason: SDUPTHER

## 2020-06-12 RX ORDER — ATORVASTATIN CALCIUM 40 MG/1
TABLET, FILM COATED ORAL
Qty: 90 TABLET | Refills: 1 | Status: SHIPPED | OUTPATIENT
Start: 2020-06-12 | End: 2021-09-07 | Stop reason: SDUPTHER

## 2020-06-12 RX ORDER — ATORVASTATIN CALCIUM 40 MG/1
TABLET, FILM COATED ORAL
Qty: 90 TABLET | Refills: 1 | Status: SHIPPED | OUTPATIENT
Start: 2020-06-12 | End: 2021-09-07

## 2020-07-10 ENCOUNTER — OFFICE (AMBULATORY)
Dept: URBAN - METROPOLITAN AREA CLINIC 64 | Facility: CLINIC | Age: 70
End: 2020-07-10

## 2020-07-10 VITALS
DIASTOLIC BLOOD PRESSURE: 69 MMHG | HEART RATE: 81 BPM | WEIGHT: 186 LBS | SYSTOLIC BLOOD PRESSURE: 117 MMHG | HEIGHT: 68 IN

## 2020-07-10 DIAGNOSIS — Z86.010 PERSONAL HISTORY OF COLONIC POLYPS: ICD-10-CM

## 2020-07-10 DIAGNOSIS — I25.10 ATHEROSCLEROTIC HEART DISEASE OF NATIVE CORONARY ARTERY WITH: ICD-10-CM

## 2020-07-10 DIAGNOSIS — N18.9 CHRONIC KIDNEY DISEASE, UNSPECIFIED: ICD-10-CM

## 2020-07-10 PROCEDURE — 99203 OFFICE O/P NEW LOW 30 MIN: CPT | Performed by: NURSE PRACTITIONER

## 2020-08-13 ENCOUNTER — LAB (OUTPATIENT)
Dept: LAB | Facility: HOSPITAL | Age: 70
End: 2020-08-13

## 2020-08-13 ENCOUNTER — TRANSCRIBE ORDERS (OUTPATIENT)
Dept: ADMINISTRATIVE | Facility: HOSPITAL | Age: 70
End: 2020-08-13

## 2020-08-13 DIAGNOSIS — N18.2 CHRONIC KIDNEY DISEASE, STAGE II (MILD): ICD-10-CM

## 2020-08-13 DIAGNOSIS — N18.2 CHRONIC KIDNEY DISEASE, STAGE II (MILD): Primary | ICD-10-CM

## 2020-08-13 LAB
25(OH)D3 SERPL-MCNC: 59.9 NG/ML (ref 30–100)
ALBUMIN UR-MCNC: 9 MG/DL
ANION GAP SERPL CALCULATED.3IONS-SCNC: 14.5 MMOL/L (ref 5–15)
BUN SERPL-MCNC: 19 MG/DL (ref 8–23)
BUN/CREAT SERPL: 12.9 (ref 7–25)
CALCIUM SPEC-SCNC: 9.8 MG/DL (ref 8.6–10.5)
CHLORIDE SERPL-SCNC: 101 MMOL/L (ref 98–107)
CO2 SERPL-SCNC: 24.5 MMOL/L (ref 22–29)
CREAT SERPL-MCNC: 1.47 MG/DL (ref 0.76–1.27)
CREAT UR-MCNC: 27.6 MG/DL
GFR SERPL CREATININE-BSD FRML MDRD: 48 ML/MIN/1.73
GLUCOSE SERPL-MCNC: 97 MG/DL (ref 65–99)
MICROALBUMIN/CREAT UR: 326.1 MG/G
PHOSPHATE SERPL-MCNC: 2.4 MG/DL (ref 2.5–4.5)
POTASSIUM SERPL-SCNC: 3.7 MMOL/L (ref 3.5–5.2)
PTH-INTACT SERPL-MCNC: 29.8 PG/ML (ref 15–65)
SODIUM SERPL-SCNC: 140 MMOL/L (ref 136–145)

## 2020-08-13 PROCEDURE — 84100 ASSAY OF PHOSPHORUS: CPT

## 2020-08-13 PROCEDURE — 83970 ASSAY OF PARATHORMONE: CPT

## 2020-08-13 PROCEDURE — 82306 VITAMIN D 25 HYDROXY: CPT

## 2020-08-13 PROCEDURE — 82570 ASSAY OF URINE CREATININE: CPT

## 2020-08-13 PROCEDURE — 80048 BASIC METABOLIC PNL TOTAL CA: CPT

## 2020-08-13 PROCEDURE — 36415 COLL VENOUS BLD VENIPUNCTURE: CPT

## 2020-08-13 PROCEDURE — 82043 UR ALBUMIN QUANTITATIVE: CPT

## 2020-08-28 ENCOUNTER — ON CAMPUS - OUTPATIENT (AMBULATORY)
Dept: URBAN - METROPOLITAN AREA HOSPITAL 2 | Facility: HOSPITAL | Age: 70
End: 2020-08-28
Payer: MEDICARE

## 2020-08-28 ENCOUNTER — OFFICE (AMBULATORY)
Dept: URBAN - METROPOLITAN AREA PATHOLOGY 4 | Facility: PATHOLOGY | Age: 70
End: 2020-08-28
Payer: COMMERCIAL

## 2020-08-28 VITALS
SYSTOLIC BLOOD PRESSURE: 93 MMHG | HEART RATE: 77 BPM | SYSTOLIC BLOOD PRESSURE: 91 MMHG | SYSTOLIC BLOOD PRESSURE: 121 MMHG | WEIGHT: 177 LBS | RESPIRATION RATE: 16 BRPM | OXYGEN SATURATION: 98 % | HEART RATE: 88 BPM | HEART RATE: 80 BPM | DIASTOLIC BLOOD PRESSURE: 58 MMHG | HEART RATE: 73 BPM | DIASTOLIC BLOOD PRESSURE: 83 MMHG | RESPIRATION RATE: 15 BRPM | SYSTOLIC BLOOD PRESSURE: 94 MMHG | DIASTOLIC BLOOD PRESSURE: 80 MMHG | OXYGEN SATURATION: 97 % | RESPIRATION RATE: 18 BRPM | SYSTOLIC BLOOD PRESSURE: 84 MMHG | DIASTOLIC BLOOD PRESSURE: 78 MMHG | DIASTOLIC BLOOD PRESSURE: 69 MMHG | TEMPERATURE: 97.8 F | HEART RATE: 78 BPM | SYSTOLIC BLOOD PRESSURE: 116 MMHG | DIASTOLIC BLOOD PRESSURE: 55 MMHG | DIASTOLIC BLOOD PRESSURE: 63 MMHG | OXYGEN SATURATION: 93 % | OXYGEN SATURATION: 96 % | SYSTOLIC BLOOD PRESSURE: 122 MMHG | HEART RATE: 75 BPM | SYSTOLIC BLOOD PRESSURE: 108 MMHG | HEART RATE: 79 BPM | HEIGHT: 68 IN | SYSTOLIC BLOOD PRESSURE: 105 MMHG

## 2020-08-28 DIAGNOSIS — D12.3 BENIGN NEOPLASM OF TRANSVERSE COLON: ICD-10-CM

## 2020-08-28 DIAGNOSIS — K63.5 POLYP OF COLON: ICD-10-CM

## 2020-08-28 DIAGNOSIS — Z86.010 PERSONAL HISTORY OF COLONIC POLYPS: ICD-10-CM

## 2020-08-28 DIAGNOSIS — K57.30 DIVERTICULOSIS OF LARGE INTESTINE WITHOUT PERFORATION OR ABS: ICD-10-CM

## 2020-08-28 LAB
GI HISTOLOGY: A. UNSPECIFIED: (no result)
GI HISTOLOGY: B. UNSPECIFIED: (no result)
GI HISTOLOGY: PDF REPORT: (no result)

## 2020-08-28 PROCEDURE — 88305 TISSUE EXAM BY PATHOLOGIST: CPT | Mod: 33 | Performed by: INTERNAL MEDICINE

## 2020-08-28 PROCEDURE — 45385 COLONOSCOPY W/LESION REMOVAL: CPT | Mod: 33 | Performed by: INTERNAL MEDICINE

## 2020-09-02 ENCOUNTER — OFFICE VISIT (OUTPATIENT)
Dept: CARDIOLOGY | Facility: CLINIC | Age: 70
End: 2020-09-02

## 2020-09-02 VITALS
RESPIRATION RATE: 18 BRPM | SYSTOLIC BLOOD PRESSURE: 134 MMHG | HEIGHT: 68 IN | HEART RATE: 71 BPM | BODY MASS INDEX: 27.28 KG/M2 | DIASTOLIC BLOOD PRESSURE: 92 MMHG | WEIGHT: 180 LBS | OXYGEN SATURATION: 96 %

## 2020-09-02 DIAGNOSIS — I10 ESSENTIAL HYPERTENSION: ICD-10-CM

## 2020-09-02 DIAGNOSIS — I10 BENIGN ESSENTIAL HTN: Chronic | ICD-10-CM

## 2020-09-02 DIAGNOSIS — E78.2 MIXED HYPERLIPIDEMIA: ICD-10-CM

## 2020-09-02 DIAGNOSIS — I25.110 CORONARY ARTERY DISEASE INVOLVING NATIVE CORONARY ARTERY OF NATIVE HEART WITH UNSTABLE ANGINA PECTORIS (HCC): Primary | ICD-10-CM

## 2020-09-02 DIAGNOSIS — I20.0 UNSTABLE ANGINA (HCC): ICD-10-CM

## 2020-09-02 PROCEDURE — 99214 OFFICE O/P EST MOD 30 MIN: CPT | Performed by: INTERNAL MEDICINE

## 2020-09-02 PROCEDURE — 93000 ELECTROCARDIOGRAM COMPLETE: CPT | Performed by: INTERNAL MEDICINE

## 2020-09-02 NOTE — PROGRESS NOTES
Cardiology Office Visit      Encounter Date:  09/02/2020    Patient ID:   Chris Tinsley is a 70 y.o. male.    Reason For Followup:  Coronary artery disease  Hypertension  Hyperlipidemia  Chronic renal insufficiency    Brief Clinical History:  Dear Dr. Peter, Dwain Bello MD    I had the pleasure of seeing Chris Tinsley today. As you are well aware, this is a 70 y.o. male who presented to [facility] with c/o dizziness, SOA, and diaphoresis.  He ruled out for acute coronary syndrome.  Cardiology was consulted and pt had stress test that revealed prior MI and miles-infarct ischemia.  Pt had subsequent cardiac cath revealing MV CAD, EF 50%.   On 6/24/2019, he underwent CABG x5.  He was extubated in a timely manner but required pressor support initially.  Creatinine was noted to be 2.1 on POD#1.  Creatinine peaked at 2.7 on POD#3.  Dr. Baker was consulted to manage his MAURY/CKD.  At discharge, his creatinine was 1.5.  His postop course was complicated by hypoxia and the inability to clear sputum.  By POD#6, he was ready for discharge.  He was on room air, up walking around, and his sputum was manageable.  He presents today in follow-up for that hospital stay.       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%  Hypertension  Hyperipidemia  Acute on chronic renal insufficiency creatinine is back to baseline/recent labs with a creatinine of 1.4    Recommendations:    Continue aggressive risk factor modification  Medications reviewed with the patient  Continue current medical therapy  Recent labs reviewed and discussed with patient renal function is stable only lab that is missing at this time is lipid levels which will be checked again last lipids were done in September 2019 those  "were discussed with the patient everything was good except for low HDL cholesterol  Test results and labs discussed with the patient  Follow-up in office in 6 months    Objective:    Vitals:  Vitals:    09/02/20 1447   BP: 134/92   BP Location: Left arm   Pulse: 71   Resp: 18   SpO2: 96%   Weight: 81.6 kg (180 lb)   Height: 172.7 cm (68\")       Physical Exam:    General: Alert, cooperative, no distress, appears stated age  Head:  Normocephalic, atraumatic, mucous membranes moist  Eyes:  Conjunctiva/corneas clear, EOM's intact     Neck:  Supple,  no adenopathy;      Lungs: Clear to auscultation bilaterally, no wheezes rhonchi rales are noted  Chest wall: No tenderness  Heart::  Regular rate and rhythm, S1 and S2 normal, no murmur, rub or gallop  Abdomen: Soft, non-tender, nondistended bowel sounds active  Extremities: No cyanosis, clubbing, or edema  Pulses: 2+ and symmetric all extremities  Skin:  No rashes or lesions  Neuro/psych: A&O x3. CN II through XII are grossly intact with appropriate affect      Allergies:  No Known Allergies    Medication Review:     Current Outpatient Medications:   •  aspirin 81 MG chewable tablet, Chew 81 mg Daily., Disp: , Rfl:   •  atorvastatin (LIPITOR) 40 MG tablet, TAKE 1 TABLET BY MOUTH NIGHTLY, Disp: 90 tablet, Rfl: 1  •  atorvastatin (LIPITOR) 40 MG tablet, TAKE 1 TABLET BY MOUTH NIGHTLY, Disp: 90 tablet, Rfl: 1  •  atorvastatin (LIPITOR) 40 MG tablet, TAKE 1 TABLET BY MOUTH NIGHTLY, Disp: 90 tablet, Rfl: 1  •  cholecalciferol (VITAMIN D3) 1000 units tablet, Take 1,000 Units by mouth Daily., Disp: , Rfl:   •  diclofenac (VOLTAREN) 1 % gel gel, Apply 4 g topically to the appropriate area as directed 3 (Three) Times a Day As Needed. Apply 1 gm to feet as needed, Disp: , Rfl:   •  furosemide (LASIX) 20 MG tablet, take 1 tablet by mouth twice a day, Disp: 180 tablet, Rfl: 2  •  guaiFENesin (MUCINEX) 600 MG 12 hr tablet, Take 2 tablets by mouth Every 12 (Twelve) Hours., Disp: 120 " tablet, Rfl: 1  •  lisinopril (PRINIVIL,ZESTRIL) 5 MG tablet, TAKE 1 TABLET BY MOUTH ONCE DAILY, Disp: 90 tablet, Rfl: 2  •  Loratadine 10 MG capsule, Take  by mouth., Disp: , Rfl:   •  metoprolol tartrate (LOPRESSOR) 25 MG tablet, Take 1 tablet by mouth Every 12 (Twelve) Hours., Disp: 180 tablet, Rfl: 1  •  Omega-3 Fatty Acids (FISH OIL) 1000 MG capsule capsule, Take  by mouth Daily With Breakfast., Disp: , Rfl:   •  potassium chloride (MICRO-K) 10 MEQ CR capsule, take 1 tablet by mouth twice a day with FUROSEMIDE, Disp: 180 capsule, Rfl: 2  •  tamsulosin (FLOMAX) 0.4 MG capsule 24 hr capsule, Take 1 capsule by mouth every night at bedtime., Disp: , Rfl: 0  •  triazolam (HALCION) 0.25 MG tablet, Take 0.25 mg by mouth At Night As Needed for Sleep. Triazolam 0.25mg 1 tab - 2 tab at bedtime PRN, Disp: , Rfl:     Family History:  Family History   Problem Relation Age of Onset   • Heart disease Father        Past Medical History:  Past Medical History:   Diagnosis Date   • Abnormal nuclear stress test 6/22/2019   • Arthritis    • Chronic deep vein thrombosis (DVT) of left lower extremity (CMS/HCC)    • Chronic kidney disease (CKD), stage II (mild)    • Coronary artery disease    • DVT (deep venous thrombosis) (CMS/HCC)    • Hypertension    • BHATTI (nonalcoholic steatohepatitis) 06/2019   • Unstable angina (CMS/HCC) 6/22/2019       Past surgical History:  Past Surgical History:   Procedure Laterality Date   • CARDIAC CATHETERIZATION N/A 6/22/2019    Procedure: LEFT HEART CATH with possible PCI;  Surgeon: Enmanuel Wild MD;  Location: [facility] CATH INVASIVE LOCATION;  Service: Cardiovascular   • COLON SURGERY     • CORONARY ARTERY BYPASS GRAFT N/A 6/24/2019    Procedure: CABG;  Surgeon: Jose Angel López MD;  Location: [facility] CVOR;  Service: Cardiothoracic   • LIVER BIOPSY  06/2019       Social History:  Social History     Socioeconomic History   • Marital status:      Spouse name: Not on file   • Number of  children: Not on file   • Years of education: Not on file   • Highest education level: Not on file   Tobacco Use   • Smoking status: Never Smoker   • Smokeless tobacco: Never Used   Substance and Sexual Activity   • Alcohol use: No     Frequency: Never   • Drug use: No   • Sexual activity: Defer       Review of Systems:  The following systems were reviewed as they relate to the cardiovascular system: Constitutional, Eyes, ENT, Cardiovascular, Respiratory, Gastrointestinal, Integumentary, Neurological, Psychiatric, Hematologic, Endocrine, Musculoskeletal, and Genitourinary. The pertinent cardiovascular findings are reported above with all other cardiovascular points within those systems being negative.    Diagnostic Study Review:     Current Electrocardiogram:    ECG 12 Lead  Date/Time: 9/2/2020 3:04 PM  Performed by: Enmanuel Wild MD  Authorized by: Enmanuel Wild MD   Comparison: compared with previous ECG   Similar to previous ECG  Rhythm: sinus rhythm  Rate: normal  BPM: 70  Conduction: conduction normal  Q waves: II, III and aVF    QRS axis: normal  Other findings: non-specific ST-T wave changes    Clinical impression: abnormal EKG              NOTE: The following portions of the patient's history were reviewed and updated this visit as appropriate: allergies, current medications, past family history, past medical history, past social history, past surgical history and problem list.

## 2020-12-03 RX ORDER — LISINOPRIL 5 MG/1
5 TABLET ORAL DAILY
Qty: 90 TABLET | Refills: 2 | Status: SHIPPED | OUTPATIENT
Start: 2020-12-03 | End: 2021-06-02

## 2021-02-18 ENCOUNTER — TRANSCRIBE ORDERS (OUTPATIENT)
Dept: ADMINISTRATIVE | Facility: HOSPITAL | Age: 71
End: 2021-02-18

## 2021-02-18 ENCOUNTER — LAB (OUTPATIENT)
Dept: LAB | Facility: HOSPITAL | Age: 71
End: 2021-02-18

## 2021-02-18 DIAGNOSIS — I10 ESSENTIAL HYPERTENSION, MALIGNANT: Primary | ICD-10-CM

## 2021-02-18 DIAGNOSIS — N18.30 MALIGNANT HYPERTENSIVE HEART AND CHRONIC KIDNEY DISEASE STAGE III (HCC): ICD-10-CM

## 2021-02-18 DIAGNOSIS — E78.2 MIXED HYPERLIPIDEMIA: ICD-10-CM

## 2021-02-18 DIAGNOSIS — I10 ESSENTIAL HYPERTENSION: ICD-10-CM

## 2021-02-18 DIAGNOSIS — I10 ESSENTIAL HYPERTENSION, MALIGNANT: ICD-10-CM

## 2021-02-18 DIAGNOSIS — I13.10 MALIGNANT HYPERTENSIVE HEART AND CHRONIC KIDNEY DISEASE STAGE III (HCC): ICD-10-CM

## 2021-02-18 LAB
ANION GAP SERPL CALCULATED.3IONS-SCNC: 10.4 MMOL/L (ref 5–15)
BACTERIA UR QL AUTO: NORMAL /HPF
BILIRUB UR QL STRIP: NEGATIVE
BUN SERPL-MCNC: 31 MG/DL (ref 8–23)
BUN/CREAT SERPL: 23.7 (ref 7–25)
CALCIUM SPEC-SCNC: 9.7 MG/DL (ref 8.6–10.5)
CHLORIDE SERPL-SCNC: 103 MMOL/L (ref 98–107)
CHOLEST SERPL-MCNC: 103 MG/DL (ref 0–200)
CLARITY UR: CLEAR
CO2 SERPL-SCNC: 24.6 MMOL/L (ref 22–29)
COLOR UR: YELLOW
CREAT SERPL-MCNC: 1.31 MG/DL (ref 0.76–1.27)
GFR SERPL CREATININE-BSD FRML MDRD: 54 ML/MIN/1.73
GLUCOSE SERPL-MCNC: 93 MG/DL (ref 65–99)
GLUCOSE UR STRIP-MCNC: NEGATIVE MG/DL
HDLC SERPL-MCNC: 42 MG/DL (ref 40–60)
HGB UR QL STRIP.AUTO: NEGATIVE
HYALINE CASTS UR QL AUTO: NORMAL /LPF
KETONES UR QL STRIP: NEGATIVE
LDLC SERPL CALC-MCNC: 40 MG/DL (ref 0–100)
LDLC/HDLC SERPL: 0.89 {RATIO}
LEUKOCYTE ESTERASE UR QL STRIP.AUTO: NEGATIVE
NITRITE UR QL STRIP: NEGATIVE
PH UR STRIP.AUTO: 5.5 [PH] (ref 5–8)
POTASSIUM SERPL-SCNC: 3.9 MMOL/L (ref 3.5–5.2)
PROT UR QL STRIP: ABNORMAL
RBC # UR: NORMAL /HPF
REF LAB TEST METHOD: NORMAL
SODIUM SERPL-SCNC: 138 MMOL/L (ref 136–145)
SP GR UR STRIP: 1.02 (ref 1–1.03)
SQUAMOUS #/AREA URNS HPF: NORMAL /HPF
TRIGL SERPL-MCNC: 118 MG/DL (ref 0–150)
UROBILINOGEN UR QL STRIP: ABNORMAL
VLDLC SERPL-MCNC: 21 MG/DL (ref 5–40)
WBC UR QL AUTO: NORMAL /HPF

## 2021-02-18 PROCEDURE — 81001 URINALYSIS AUTO W/SCOPE: CPT

## 2021-02-18 PROCEDURE — 80048 BASIC METABOLIC PNL TOTAL CA: CPT

## 2021-02-18 PROCEDURE — 36415 COLL VENOUS BLD VENIPUNCTURE: CPT

## 2021-02-18 PROCEDURE — 80061 LIPID PANEL: CPT

## 2021-02-22 ENCOUNTER — TELEPHONE (OUTPATIENT)
Dept: CARDIOLOGY | Facility: CLINIC | Age: 71
End: 2021-02-22

## 2021-02-22 NOTE — TELEPHONE ENCOUNTER
Called and informed the Pt per Dr. Vargas that his lipids look good. The Pt stated understanding.

## 2021-03-10 ENCOUNTER — OFFICE VISIT (OUTPATIENT)
Dept: CARDIOLOGY | Facility: CLINIC | Age: 71
End: 2021-03-10

## 2021-03-10 VITALS
WEIGHT: 170 LBS | HEART RATE: 68 BPM | HEIGHT: 68 IN | RESPIRATION RATE: 18 BRPM | BODY MASS INDEX: 25.76 KG/M2 | DIASTOLIC BLOOD PRESSURE: 73 MMHG | SYSTOLIC BLOOD PRESSURE: 115 MMHG | OXYGEN SATURATION: 96 %

## 2021-03-10 DIAGNOSIS — I25.110 CORONARY ARTERY DISEASE INVOLVING NATIVE CORONARY ARTERY OF NATIVE HEART WITH UNSTABLE ANGINA PECTORIS (HCC): ICD-10-CM

## 2021-03-10 DIAGNOSIS — I10 ESSENTIAL HYPERTENSION: ICD-10-CM

## 2021-03-10 DIAGNOSIS — Z95.1 S/P CABG (CORONARY ARTERY BYPASS GRAFT): ICD-10-CM

## 2021-03-10 DIAGNOSIS — E78.2 MIXED HYPERLIPIDEMIA: ICD-10-CM

## 2021-03-10 DIAGNOSIS — I10 BENIGN ESSENTIAL HTN: Primary | Chronic | ICD-10-CM

## 2021-03-10 PROCEDURE — 93000 ELECTROCARDIOGRAM COMPLETE: CPT | Performed by: INTERNAL MEDICINE

## 2021-03-10 PROCEDURE — 99214 OFFICE O/P EST MOD 30 MIN: CPT | Performed by: INTERNAL MEDICINE

## 2021-03-10 NOTE — PROGRESS NOTES
Cardiology Office Visit      Encounter Date:  03/10/2021    Patient ID:   Chris Tinsley is a 70 y.o. male.    Reason For Followup:  Coronary artery disease  Hypertension  Hyperlipidemia  Chronic renal insufficiency    Brief Clinical History:  Dear Dr. Peter, Dwain Bello MD    I had the pleasure of seeing Chris Tinsley today. As you are well aware, this is a 70 y.o. male who presented to [facility] with c/o dizziness, SOA, and diaphoresis.  He ruled out for acute coronary syndrome.  Cardiology was consulted and pt had stress test that revealed prior MI and miles-infarct ischemia.  Pt had subsequent cardiac cath revealing MV CAD, EF 50%.   On 6/24/2019, he underwent CABG x5.  He was extubated in a timely manner but required pressor support initially.  Creatinine was noted to be 2.1 on POD#1.  Creatinine peaked at 2.7 on POD#3.  Dr. Baker was consulted to manage his MAURY/CKD.  At discharge, his creatinine was 1.5.  His postop course was complicated by hypoxia and the inability to clear sputum.  By POD#6, he was ready for discharge.  He was on room air, up walking around, and his sputum was manageable.  He presents today in follow-up for that hospital stay.       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%  Hypertension  Hyperipidemia  Acute on chronic renal insufficiency creatinine is back to baseline/recent labs with a stable creatinine    Recommendations:    Continue aggressive risk factor modification  Medications reviewed with the patient  Continue current medical therapy  Recent labs reviewed and discussed with patient   Lipids  "at goal  Test results and labs discussed with the patient  Follow-up in office in 6 months    Objective:    Vitals:  Vitals:    03/10/21 1437   BP: 115/73   BP Location: Left arm   Pulse: 68   Resp: 18   SpO2: 96%   Weight: 77.1 kg (170 lb)   Height: 172.7 cm (68\")       Physical Exam:    General: Alert, cooperative, no distress, appears stated age  Head:  Normocephalic, atraumatic, mucous membranes moist  Eyes:  Conjunctiva/corneas clear, EOM's intact     Neck:  Supple,  no adenopathy;      Lungs: Clear to auscultation bilaterally, no wheezes rhonchi rales are noted  Chest wall: No tenderness  Heart::  Regular rate and rhythm, S1 and S2 normal, no murmur, rub or gallop  Abdomen: Soft, non-tender, nondistended bowel sounds active  Extremities: No cyanosis, clubbing, or edema  Pulses: 2+ and symmetric all extremities  Skin:  No rashes or lesions  Neuro/psych: A&O x3. CN II through XII are grossly intact with appropriate affect      Allergies:  No Known Allergies    Medication Review:     Current Outpatient Medications:   •  aspirin 81 MG chewable tablet, Chew 81 mg Daily., Disp: , Rfl:   •  atorvastatin (LIPITOR) 40 MG tablet, TAKE 1 TABLET BY MOUTH NIGHTLY, Disp: 90 tablet, Rfl: 1  •  atorvastatin (LIPITOR) 40 MG tablet, TAKE 1 TABLET BY MOUTH NIGHTLY, Disp: 90 tablet, Rfl: 1  •  atorvastatin (LIPITOR) 40 MG tablet, TAKE 1 TABLET BY MOUTH NIGHTLY, Disp: 90 tablet, Rfl: 1  •  cholecalciferol (VITAMIN D3) 1000 units tablet, Take 1,000 Units by mouth Daily., Disp: , Rfl:   •  diclofenac (VOLTAREN) 1 % gel gel, Apply 4 g topically to the appropriate area as directed 3 (Three) Times a Day As Needed. Apply 1 gm to feet as needed, Disp: , Rfl:   •  guaiFENesin (MUCINEX) 600 MG 12 hr tablet, Take 2 tablets by mouth Every 12 (Twelve) Hours., Disp: 120 tablet, Rfl: 1  •  lisinopril (PRINIVIL,ZESTRIL) 5 MG tablet, Take 1 tablet by mouth Daily., Disp: 90 tablet, Rfl: 2  •  Loratadine 10 MG capsule, Take  by mouth., Disp: , Rfl: "   •  metoprolol tartrate (LOPRESSOR) 25 MG tablet, TAKE 1 TABLET BY MOUTH EVERY 12 HOURS, Disp: 180 tablet, Rfl: 1  •  Omega-3 Fatty Acids (FISH OIL) 1000 MG capsule capsule, Take  by mouth Daily With Breakfast., Disp: , Rfl:   •  tamsulosin (FLOMAX) 0.4 MG capsule 24 hr capsule, Take 1 capsule by mouth every night at bedtime., Disp: , Rfl: 0  •  triazolam (HALCION) 0.25 MG tablet, Take 0.25 mg by mouth At Night As Needed for Sleep. Triazolam 0.25mg 1 tab - 2 tab at bedtime PRN, Disp: , Rfl:     Family History:  Family History   Problem Relation Age of Onset   • Heart disease Father        Past Medical History:  Past Medical History:   Diagnosis Date   • Abnormal nuclear stress test 6/22/2019   • Arthritis    • Chronic deep vein thrombosis (DVT) of left lower extremity (CMS/HCC)    • Chronic kidney disease (CKD), stage II (mild)    • Coronary artery disease    • DVT (deep venous thrombosis) (CMS/HCC)    • Hypertension    • BHATTI (nonalcoholic steatohepatitis) 06/2019   • Unstable angina (CMS/HCC) 6/22/2019       Past surgical History:  Past Surgical History:   Procedure Laterality Date   • CARDIAC CATHETERIZATION N/A 6/22/2019    Procedure: LEFT HEART CATH with possible PCI;  Surgeon: Enmanuel Wild MD;  Location: [facility] CATH IN[facility]ASIVE [facility];  Service: Cardiovascular   • COLON SURGERY     • CORONARY ARTERY BYPASS GRAFT N/A 6/24/2019    Procedure: CABG;  Surgeon: Jose Angel López MD;  [facility];  Service: Cardiothoracic   • LIVER BIOPSY  06/2019       Social History:  Social History     Socioeconomic History   • Marital status:      Spouse name: Not on file   • Number of children: Not on file   • Years of education: Not on file   • Highest education level: Not on file   Tobacco Use   • Smoking status: Never Smoker   • Smokeless tobacco: Never Used   Vaping Use   • Vaping Use: Never used   Substance and Sexual Activity   • Alcohol use: No   • Drug use: No   • Sexual activity: Defer        Review of Systems:  The following systems were reviewed as they relate to the cardiovascular system: Constitutional, Eyes, ENT, Cardiovascular, Respiratory, Gastrointestinal, Integumentary, Neurological, Psychiatric, Hematologic, Endocrine, Musculoskeletal, and Genitourinary. The pertinent cardiovascular findings are reported above with all other cardiovascular points within those systems being negative.    Diagnostic Study Review:     Current Electrocardiogram:    ECG 12 Lead    Date/Time: 3/10/2021 2:57 PM  Performed by: Enmanuel Wild MD  Authorized by: Enmanuel Wild MD   Comparison: compared with previous ECG   Similar to previous ECG  Rhythm: sinus rhythm  Rate: normal  BPM: 68  Conduction: conduction normal  QRS axis: normal  Other findings: non-specific ST-T wave changes    Clinical impression: abnormal EKG  Comments: Old inferior wall myocardial infarction and ST-T changes secondary to old inferior wall myocardial infarction              NOTE: The following portions of the patient's history were reviewed and updated this visit as appropriate: allergies, current medications, past family history, past medical history, past social history, past surgical history and problem list.

## 2021-05-21 ENCOUNTER — OFFICE VISIT (OUTPATIENT)
Dept: PAIN MEDICINE | Facility: CLINIC | Age: 71
End: 2021-05-21

## 2021-05-21 VITALS
SYSTOLIC BLOOD PRESSURE: 126 MMHG | HEART RATE: 79 BPM | RESPIRATION RATE: 16 BRPM | OXYGEN SATURATION: 97 % | DIASTOLIC BLOOD PRESSURE: 85 MMHG | BODY MASS INDEX: 26.83 KG/M2 | HEIGHT: 68 IN | TEMPERATURE: 97.5 F | WEIGHT: 177 LBS

## 2021-05-21 DIAGNOSIS — S72.001S CLOSED FRACTURE OF NECK OF RIGHT FEMUR, SEQUELA: Primary | ICD-10-CM

## 2021-05-21 PROCEDURE — 99204 OFFICE O/P NEW MOD 45 MIN: CPT | Performed by: STUDENT IN AN ORGANIZED HEALTH CARE EDUCATION/TRAINING PROGRAM

## 2021-05-21 PROCEDURE — G0463 HOSPITAL OUTPT CLINIC VISIT: HCPCS | Performed by: STUDENT IN AN ORGANIZED HEALTH CARE EDUCATION/TRAINING PROGRAM

## 2021-05-21 RX ORDER — HYDROCODONE BITARTRATE AND ACETAMINOPHEN 5; 325 MG/1; MG/1
TABLET ORAL
COMMUNITY
Start: 2021-05-01 | End: 2021-05-21 | Stop reason: SDUPTHER

## 2021-05-21 RX ORDER — HYDROCODONE BITARTRATE AND ACETAMINOPHEN 5; 325 MG/1; MG/1
1 TABLET ORAL EVERY 6 HOURS PRN
Qty: 120 TABLET | Refills: 0 | Status: SHIPPED | OUTPATIENT
Start: 2021-05-21 | End: 2022-03-16 | Stop reason: SDUPTHER

## 2021-05-21 NOTE — PROGRESS NOTES
CHIEF COMPLAINT  Chief Complaint   Patient presents with   • Shoulder Pain     right--#3--hydrocodone last dose 5-21 at 0300       Primary Care  Dwain Peter MD    Subjective   Chris Tinsley is a 71 y.o. male  who presents for right shoulder pain medication management.  He states that approximately 6 months ago, he sustained a fall off a ladder and subsequently fractured the head of the humerus.  He also sustained a rotator cuff injury resulting in biceps tendinopathy as well as adhesive capsulitis.  He is currently being treated conservatively with his orthopedic surgeon, but continues to require pain medication.  He is currently working with physical therapy as well as exercising several times a day at home.    History of Present Illness     Location: Right shoulder  Onset: 6 months ago  Duration: Improving  Timing: Constant throughout the day  Quality: Sharp, stabbing  Severity: Today: 3       Last Week: 3       Worst: 7  Modifying Factors: The pain is worse with exercising and working with physical therapy    Physical Therapy: yes    Interval Update 05/21/2021:     The following portions of the patient's history were reviewed and updated as appropriate: allergies, current medications, past family history, past medical history, past social history, past surgical history and problem list.      Current Outpatient Medications:   •  aspirin 81 MG chewable tablet, Chew 81 mg Daily., Disp: , Rfl:   •  atorvastatin (LIPITOR) 40 MG tablet, TAKE 1 TABLET BY MOUTH NIGHTLY, Disp: 90 tablet, Rfl: 1  •  cholecalciferol (VITAMIN D3) 1000 units tablet, Take 1,000 Units by mouth Daily., Disp: , Rfl:   •  diclofenac (VOLTAREN) 1 % gel gel, Apply 4 g topically to the appropriate area as directed 3 (Three) Times a Day As Needed. Apply 1 gm to feet as needed, Disp: , Rfl:   •  HYDROcodone-acetaminophen (NORCO) 5-325 MG per tablet, Take 1 tablet by mouth Every 6 (Six) Hours As Needed for Severe Pain ., Disp: 120 tablet,  "Rfl: 0  •  lisinopril (PRINIVIL,ZESTRIL) 5 MG tablet, Take 1 tablet by mouth Daily., Disp: 90 tablet, Rfl: 2  •  Loratadine 10 MG capsule, Take  by mouth., Disp: , Rfl:   •  metoprolol tartrate (LOPRESSOR) 25 MG tablet, TAKE 1 TABLET BY MOUTH EVERY 12 HOURS, Disp: 180 tablet, Rfl: 1  •  Omega-3 Fatty Acids (FISH OIL) 1000 MG capsule capsule, Take  by mouth Daily With Breakfast., Disp: , Rfl:   •  tamsulosin (FLOMAX) 0.4 MG capsule 24 hr capsule, Take 1 capsule by mouth every night at bedtime., Disp: , Rfl: 0  •  triazolam (HALCION) 0.25 MG tablet, Take 0.25 mg by mouth At Night As Needed for Sleep. Triazolam 0.25mg 1 tab - 2 tab at bedtime PRN, Disp: , Rfl:   •  atorvastatin (LIPITOR) 40 MG tablet, TAKE 1 TABLET BY MOUTH NIGHTLY, Disp: 90 tablet, Rfl: 1  •  atorvastatin (LIPITOR) 40 MG tablet, TAKE 1 TABLET BY MOUTH NIGHTLY, Disp: 90 tablet, Rfl: 1  •  guaiFENesin (MUCINEX) 600 MG 12 hr tablet, Take 2 tablets by mouth Every 12 (Twelve) Hours., Disp: 120 tablet, Rfl: 1    Review of Systems   Constitutional: Negative for fatigue.   Gastrointestinal: Negative for constipation.   Musculoskeletal: Positive for arthralgias and myalgias.        Right shoulder pain       Vitals:    05/21/21 0911   BP: 126/85   Pulse: 79   Resp: 16   Temp: 97.5 °F (36.4 °C)   SpO2: 97%   Weight: 80.3 kg (177 lb)   Height: 172.7 cm (68\")   PainSc:   3       Urine Drug Screen: 5/21/2021  Appropriate: Pending    Objective   Physical Exam  Vitals and nursing note reviewed.   Constitutional:       General: He is not in acute distress.     Appearance: Normal appearance. He is normal weight.   Musculoskeletal:      Right shoulder: Tenderness and crepitus present. Decreased range of motion. Decreased strength.   Neurological:      Mental Status: He is alert.           Assessment/Plan   Problems Addressed this Visit     None      Visit Diagnoses     Closed fracture of neck of right femur, sequela    -  Primary    Relevant Medications    " HYDROcodone-acetaminophen (NORCO) 5-325 MG per tablet      Diagnoses       Codes Comments    Closed fracture of neck of right femur, sequela    -  Primary ICD-10-CM: S72.001S  ICD-9-CM: 905.3           Plan:  1. At this point, there is little that I can offer him other than narcotic pain medication.  He is currently already working physical therapy and his shoulder has been deemed nonoperable.  Per the patient, his surgeon feels that the shoulder is healing appropriately  2. He states that he currently takes hydrocodone 5 mg 4 times a day  3. We will obtain UDS and contract today  4. Inspect appropriate  5. Refill hydrocodone 5 mg 4 times daily  --- Follow-up 1 month           INSPECT REPORT    As part of the patient's treatment plan, I may be prescribing controlled substances. The patient has been made aware of appropriate use of such medications, including potential risk of somnolence, limited ability to drive and/or work safely, and the potential for dependence or overdose. It has also bee made clear that these medications are for use by this patient only, without concomitant use of alcohol or other substances unless prescribed.     Patient has completed prescribing agreement detailing terms of continued prescribing of controlled substances, including monitoring ESTEFANIA reports, urine drug screening, and pill counts if necessary. The patient is aware that inappropriate use will results in cessation of prescribing such medications.    INSPECT report has been reviewed and scanned into the patient's chart.    As the clinician, I personally reviewed the INSPECT from 5/19/2021.    History and physical exam exhibit continued safe and appropriate use of controlled substances.      EMR Dragon/Transcription disclaimer:   Much of this encounter note is an electronic transcription/translation of spoken language to printed text. The electronic translation of spoken language may permit erroneous, or at times, nonsensical words  or phrases to be inadvertently transcribed; Although I have reviewed the note for such errors, some may still exist.

## 2021-06-02 RX ORDER — LISINOPRIL 5 MG/1
5 TABLET ORAL DAILY
Qty: 90 TABLET | Refills: 2 | Status: SHIPPED | OUTPATIENT
Start: 2021-06-02 | End: 2021-07-02 | Stop reason: DRUGHIGH

## 2021-07-02 RX ORDER — LISINOPRIL 10 MG/1
10 TABLET ORAL DAILY
Qty: 30 TABLET | Refills: 6 | Status: SHIPPED | OUTPATIENT
Start: 2021-07-02 | End: 2021-07-07

## 2021-07-02 NOTE — TELEPHONE ENCOUNTER
Pt called stating that his BP is eleavated. He is c/o headache and dizziness that has happened twice. He reports the following blood pressure readings: 151/91, 154/93, 156/97. He currently takes Lisinopril 5mg- 1 po daily and Metoprolol Tart 25mg- 1 po BID. Per Dr. Vargas increase Lisinopril to 10mg- 1 po daily and continue to monitor BP and heart rate. I informed the Pt and he stated understanding and is agreeable to this plan.

## 2021-07-07 RX ORDER — LISINOPRIL 10 MG/1
10 TABLET ORAL DAILY
Qty: 90 TABLET | Refills: 2 | Status: SHIPPED | OUTPATIENT
Start: 2021-07-07 | End: 2022-02-07

## 2021-07-12 ENCOUNTER — TELEPHONE (OUTPATIENT)
Dept: CARDIOLOGY | Facility: CLINIC | Age: 71
End: 2021-07-12

## 2021-07-12 NOTE — TELEPHONE ENCOUNTER
Pt. Called to keep track off blood pressure for another week then to call us Monday if it is still elevated.

## 2021-07-12 NOTE — TELEPHONE ENCOUNTER
Enmanuel Wild MD Fouts, Stephanie L, RN  Watch for 1 more week with 10 mg of lisinopril and if the blood pressure is still elevated will consider adding a low-dose hydrochlorothiazide

## 2021-07-12 NOTE — TELEPHONE ENCOUNTER
Michelle Chiang RN Pendyala, Lakshmana K., MD  Pt called and BP still running 150's/90s. Heart rate 60-67.  Said his  b/p was good for a couple days after increasing his medication but has now returned to being elevated again. Please advise

## 2021-07-29 ENCOUNTER — TRANSCRIBE ORDERS (OUTPATIENT)
Dept: ADMINISTRATIVE | Facility: HOSPITAL | Age: 71
End: 2021-07-29

## 2021-07-29 ENCOUNTER — LAB (OUTPATIENT)
Dept: LAB | Facility: HOSPITAL | Age: 71
End: 2021-07-29

## 2021-07-29 DIAGNOSIS — N18.30 MALIGNANT HYPERTENSIVE HEART AND CHRONIC KIDNEY DISEASE STAGE III (HCC): ICD-10-CM

## 2021-07-29 DIAGNOSIS — N18.30 MALIGNANT HYPERTENSIVE HEART AND CHRONIC KIDNEY DISEASE STAGE III (HCC): Primary | ICD-10-CM

## 2021-07-29 DIAGNOSIS — I13.10 MALIGNANT HYPERTENSIVE HEART AND CHRONIC KIDNEY DISEASE STAGE III (HCC): Primary | ICD-10-CM

## 2021-07-29 DIAGNOSIS — I13.10 MALIGNANT HYPERTENSIVE HEART AND CHRONIC KIDNEY DISEASE STAGE III (HCC): ICD-10-CM

## 2021-07-29 LAB
25(OH)D3 SERPL-MCNC: 48.3 NG/ML (ref 30–100)
ANION GAP SERPL CALCULATED.3IONS-SCNC: 8.5 MMOL/L (ref 5–15)
BUN SERPL-MCNC: 15 MG/DL (ref 8–23)
BUN/CREAT SERPL: 11.5 (ref 7–25)
CALCIUM SPEC-SCNC: 9 MG/DL (ref 8.6–10.5)
CHLORIDE SERPL-SCNC: 105 MMOL/L (ref 98–107)
CO2 SERPL-SCNC: 25.5 MMOL/L (ref 22–29)
CREAT SERPL-MCNC: 1.31 MG/DL (ref 0.76–1.27)
CREAT UR-MCNC: 133.8 MG/DL
GFR SERPL CREATININE-BSD FRML MDRD: 54 ML/MIN/1.73
GLUCOSE SERPL-MCNC: 103 MG/DL (ref 65–99)
POTASSIUM SERPL-SCNC: 4 MMOL/L (ref 3.5–5.2)
PROT UR-MCNC: 138 MG/DL
PROT/CREAT UR: 1031.4 MG/G CREA (ref 0–200)
PTH-INTACT SERPL-MCNC: 34.6 PG/ML (ref 15–65)
SODIUM SERPL-SCNC: 139 MMOL/L (ref 136–145)

## 2021-07-29 PROCEDURE — 80048 BASIC METABOLIC PNL TOTAL CA: CPT

## 2021-07-29 PROCEDURE — 82570 ASSAY OF URINE CREATININE: CPT

## 2021-07-29 PROCEDURE — 84156 ASSAY OF PROTEIN URINE: CPT

## 2021-07-29 PROCEDURE — 82306 VITAMIN D 25 HYDROXY: CPT

## 2021-07-29 PROCEDURE — 83970 ASSAY OF PARATHORMONE: CPT

## 2021-07-29 PROCEDURE — 36415 COLL VENOUS BLD VENIPUNCTURE: CPT

## 2021-09-07 RX ORDER — ATORVASTATIN CALCIUM 40 MG/1
TABLET, FILM COATED ORAL
Qty: 30 TABLET | Refills: 0 | Status: SHIPPED | OUTPATIENT
Start: 2021-09-07 | End: 2021-09-08

## 2021-09-08 RX ORDER — ATORVASTATIN CALCIUM 40 MG/1
TABLET, FILM COATED ORAL
Qty: 90 TABLET | Refills: 1 | Status: SHIPPED | OUTPATIENT
Start: 2021-09-08 | End: 2021-09-27

## 2021-09-15 ENCOUNTER — OFFICE VISIT (OUTPATIENT)
Dept: CARDIOLOGY | Facility: CLINIC | Age: 71
End: 2021-09-15

## 2021-09-15 VITALS
BODY MASS INDEX: 27.67 KG/M2 | DIASTOLIC BLOOD PRESSURE: 84 MMHG | SYSTOLIC BLOOD PRESSURE: 144 MMHG | WEIGHT: 182 LBS | HEART RATE: 81 BPM | OXYGEN SATURATION: 97 %

## 2021-09-15 DIAGNOSIS — Z95.1 S/P CABG (CORONARY ARTERY BYPASS GRAFT): ICD-10-CM

## 2021-09-15 DIAGNOSIS — I25.110 CORONARY ARTERY DISEASE INVOLVING NATIVE CORONARY ARTERY OF NATIVE HEART WITH UNSTABLE ANGINA PECTORIS (HCC): ICD-10-CM

## 2021-09-15 DIAGNOSIS — I20.0 UNSTABLE ANGINA (HCC): Primary | ICD-10-CM

## 2021-09-15 DIAGNOSIS — E78.2 MIXED HYPERLIPIDEMIA: ICD-10-CM

## 2021-09-15 DIAGNOSIS — I10 ESSENTIAL HYPERTENSION: ICD-10-CM

## 2021-09-15 PROCEDURE — 99214 OFFICE O/P EST MOD 30 MIN: CPT | Performed by: INTERNAL MEDICINE

## 2021-09-15 PROCEDURE — 93000 ELECTROCARDIOGRAM COMPLETE: CPT | Performed by: INTERNAL MEDICINE

## 2021-09-15 NOTE — PROGRESS NOTES
Cardiology Office Visit      Encounter Date:  09/15/2021    Patient ID:   Chris Tinsley is a 71 y.o. male.      Reason For Followup:  Coronary artery disease  Hypertension  Hyperlipidemia  Chronic renal insufficiency    Brief Clinical History:  Dear Dr. Peter, Dwain Bello MD    I had the pleasure of seeing Chris Tinsley today. As you are well aware, this is a 71 y.o. male who presented to [facility] with c/o dizziness, SOA, and diaphoresis.  He ruled out for acute coronary syndrome.  Cardiology was consulted and pt had stress test that revealed prior MI and miles-infarct ischemia.  Pt had subsequent cardiac cath revealing MV CAD, EF 50%.   On 6/24/2019, he underwent CABG x5.         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%  Hypertension  Hyperipidemia  Acute on chronic renal insufficiency creatinine is back to baseline/recent labs with a stable creatinine    Recommendations:    Continue aggressive risk factor modification  Medications reviewed with the patient  Continue current medical therapy  Recent labs reviewed and discussed with patient   Lipids at goal  Test results and labs discussed with the patient  Follow-up in office in 6 months    Objective:    Vitals:  Vitals:    09/15/21 1342   BP: 144/84   Pulse: 81   SpO2: 97%   Weight: 82.6 kg (182 lb)       Physical Exam:    General: Alert, cooperative, no distress, appears stated age  Head:  Normocephalic, atraumatic, mucous membranes moist  Eyes:  Conjunctiva/corneas clear, EOM's intact     Neck:  Supple,  no adenopathy;      Lungs: Clear to auscultation bilaterally, no  wheezes rhonchi rales are noted  Chest wall: No tenderness  Heart::  Regular rate and rhythm, S1 and S2 normal, no murmur, rub or gallop  Abdomen: Soft, non-tender, nondistended bowel sounds active  Extremities: No cyanosis, clubbing, or edema  Pulses: 2+ and symmetric all extremities  Skin:  No rashes or lesions  Neuro/psych: A&O x3. CN II through XII are grossly intact with appropriate affect      Allergies:  No Known Allergies    Medication Review:     Current Outpatient Medications:   •  aspirin 81 MG chewable tablet, Chew 81 mg Daily., Disp: , Rfl:   •  atorvastatin (LIPITOR) 40 MG tablet, TAKE 1 TABLET BY MOUTH EVERY NIGHT, Disp: 90 tablet, Rfl: 1  •  cholecalciferol (VITAMIN D3) 1000 units tablet, Take 1,000 Units by mouth Daily., Disp: , Rfl:   •  diclofenac (VOLTAREN) 1 % gel gel, Apply 4 g topically to the appropriate area as directed 3 (Three) Times a Day As Needed. Apply 1 gm to feet as needed, Disp: , Rfl:   •  guaiFENesin (MUCINEX) 600 MG 12 hr tablet, Take 2 tablets by mouth Every 12 (Twelve) Hours., Disp: 120 tablet, Rfl: 1  •  HYDROcodone-acetaminophen (NORCO) 5-325 MG per tablet, Take 1 tablet by mouth Every 6 (Six) Hours As Needed for Severe Pain ., Disp: 120 tablet, Rfl: 0  •  lisinopril (PRINIVIL,ZESTRIL) 10 MG tablet, TAKE 1 TABLET BY MOUTH DAILY, Disp: 90 tablet, Rfl: 2  •  Loratadine 10 MG capsule, Take  by mouth., Disp: , Rfl:   •  metoprolol tartrate (LOPRESSOR) 25 MG tablet, Take 1 tablet by mouth Every 12 (Twelve) Hours., Disp: 180 tablet, Rfl: 1  •  Omega-3 Fatty Acids (FISH OIL) 1000 MG capsule capsule, Take  by mouth Daily With Breakfast., Disp: , Rfl:   •  tamsulosin (FLOMAX) 0.4 MG capsule 24 hr capsule, Take 1 capsule by mouth every night at bedtime., Disp: , Rfl: 0  •  triazolam (HALCION) 0.25 MG tablet, Take 0.25 mg by mouth At Night As Needed for Sleep. Triazolam 0.25mg 1 tab - 2 tab at bedtime PRN, Disp: , Rfl:     Family History:  Family History   Problem Relation Age of Onset    • Heart disease Father        Past Medical History:  Past Medical History:   Diagnosis Date   • Abnormal nuclear stress test 6/22/2019   • Arthritis    • Chronic deep vein thrombosis (DVT) of left lower extremity (CMS/HCC)    • Chronic kidney disease (CKD), stage II (mild)    • Coronary artery disease    • DVT (deep venous thrombosis) (CMS/HCC)    • Hypertension    • BHATTI (nonalcoholic steatohepatitis) 06/2019   • Unstable angina (CMS/HCC) 6/22/2019       Past surgical History:  Past Surgical History:   Procedure Laterality Date   • CARDIAC CATHETERIZATION N/A 6/22/2019    Procedure: LEFT HEART CATH with possible PCI;  Surgeon: Enmanuel Wild MD;  Location: [facility] CATH INVASIVE LOCATION;  Service: Cardiovascular   • COLON SURGERY     • CORONARY ARTERY BYPASS GRAFT N/A 6/24/2019    Procedure: CABG;  Surgeon: Jose Angel López MD;  Location: [facility] CVOR;  Service: Cardiothoracic   • LIVER BIOPSY  06/2019       Social History:  Social History     Socioeconomic History   • Marital status:      Spouse name: Not on file   • Number of children: Not on file   • Years of education: Not on file   • Highest education level: Not on file   Tobacco Use   • Smoking status: Never Smoker   • Smokeless tobacco: Never Used   Vaping Use   • Vaping Use: Never used   Substance and Sexual Activity   • Alcohol use: No   • Drug use: No   • Sexual activity: Defer       Review of Systems:  The following systems were reviewed as they relate to the cardiovascular system: Constitutional, Eyes, ENT, Cardiovascular, Respiratory, Gastrointestinal, Integumentary, Neurological, Psychiatric, Hematologic, Endocrine, Musculoskeletal, and Genitourinary. The pertinent cardiovascular findings are reported above with all other cardiovascular points within those systems being negative.    Diagnostic Study Review:     Current Electrocardiogram:    ECG 12 Lead    Date/Time: 9/15/2021 1:52 PM  Performed by: Enmanuel Wild,  MD  Authorized by: Enmanuel Wild MD   Comparison: compared with previous ECG   Similar to previous ECG  Rhythm: sinus rhythm  Rate: normal  BPM: 80  Conduction: conduction normal  QRS axis: normal  Other findings: non-specific ST-T wave changes    Clinical impression: abnormal EKG              NOTE: The following portions of the patient's history were reviewed and updated this visit as appropriate: allergies, current medications, past family history, past medical history, past social history, past surgical history and problem list.

## 2021-09-27 RX ORDER — ATORVASTATIN CALCIUM 40 MG/1
TABLET, FILM COATED ORAL
Qty: 90 TABLET | Refills: 1 | Status: SHIPPED | OUTPATIENT
Start: 2021-09-27 | End: 2022-04-01

## 2022-01-11 ENCOUNTER — APPOINTMENT (OUTPATIENT)
Dept: CARDIOLOGY | Facility: HOSPITAL | Age: 72
End: 2022-01-11

## 2022-01-11 ENCOUNTER — HOSPITAL ENCOUNTER (EMERGENCY)
Facility: HOSPITAL | Age: 72
Discharge: HOME | End: 2022-01-11
Admitting: EMERGENCY MEDICINE

## 2022-01-11 VITALS
OXYGEN SATURATION: 94 % | SYSTOLIC BLOOD PRESSURE: 129 MMHG | TEMPERATURE: 98 F | HEART RATE: 68 BPM | WEIGHT: 196 LBS | RESPIRATION RATE: 18 BRPM | HEIGHT: 68 IN | DIASTOLIC BLOOD PRESSURE: 77 MMHG | BODY MASS INDEX: 29.7 KG/M2

## 2022-01-11 DIAGNOSIS — R20.2 PARESTHESIA OF LEFT LEG: Primary | ICD-10-CM

## 2022-01-11 LAB
ALBUMIN SERPL-MCNC: 4.1 G/DL (ref 3.5–5.2)
ALBUMIN/GLOB SERPL: 1.3 G/DL
ALP SERPL-CCNC: 82 U/L (ref 39–117)
ALT SERPL W P-5'-P-CCNC: 23 U/L (ref 1–41)
ANION GAP SERPL CALCULATED.3IONS-SCNC: 10 MMOL/L (ref 5–15)
APTT PPP: 27.6 SECONDS (ref 24–31)
AST SERPL-CCNC: 22 U/L (ref 1–40)
BASOPHILS # BLD AUTO: 0 10*3/MM3 (ref 0–0.2)
BASOPHILS NFR BLD AUTO: 0.5 % (ref 0–1.5)
BH CV LOWER VASCULAR LEFT COMMON FEMORAL AUGMENT: NORMAL
BH CV LOWER VASCULAR LEFT COMMON FEMORAL COMPETENT: NORMAL
BH CV LOWER VASCULAR LEFT COMMON FEMORAL COMPRESS: NORMAL
BH CV LOWER VASCULAR LEFT COMMON FEMORAL PHASIC: NORMAL
BH CV LOWER VASCULAR LEFT COMMON FEMORAL SPONT: NORMAL
BH CV LOWER VASCULAR LEFT DISTAL FEMORAL COMPRESS: NORMAL
BH CV LOWER VASCULAR LEFT GASTRONEMIUS COMPRESS: NORMAL
BH CV LOWER VASCULAR LEFT GREATER SAPH AK COMPRESS: NORMAL
BH CV LOWER VASCULAR LEFT GREATER SAPH BK COMPRESS: NORMAL
BH CV LOWER VASCULAR LEFT LESSER SAPH COMPRESS: NORMAL
BH CV LOWER VASCULAR LEFT MID FEMORAL AUGMENT: NORMAL
BH CV LOWER VASCULAR LEFT MID FEMORAL COMPETENT: NORMAL
BH CV LOWER VASCULAR LEFT MID FEMORAL COMPRESS: NORMAL
BH CV LOWER VASCULAR LEFT MID FEMORAL PHASIC: NORMAL
BH CV LOWER VASCULAR LEFT MID FEMORAL SPONT: NORMAL
BH CV LOWER VASCULAR LEFT PERONEAL COMPRESS: NORMAL
BH CV LOWER VASCULAR LEFT POPLITEAL AUGMENT: NORMAL
BH CV LOWER VASCULAR LEFT POPLITEAL COMPETENT: NORMAL
BH CV LOWER VASCULAR LEFT POPLITEAL COMPRESS: NORMAL
BH CV LOWER VASCULAR LEFT POPLITEAL PHASIC: NORMAL
BH CV LOWER VASCULAR LEFT POPLITEAL SPONT: NORMAL
BH CV LOWER VASCULAR LEFT POSTERIOR TIBIAL COMPRESS: NORMAL
BH CV LOWER VASCULAR LEFT PROXIMAL FEMORAL COMPRESS: NORMAL
BH CV LOWER VASCULAR LEFT SAPHENOFEMORAL JUNCTION COMPRESS: NORMAL
BH CV LOWER VASCULAR RIGHT COMMON FEMORAL AUGMENT: NORMAL
BH CV LOWER VASCULAR RIGHT COMMON FEMORAL COMPETENT: NORMAL
BH CV LOWER VASCULAR RIGHT COMMON FEMORAL COMPRESS: NORMAL
BH CV LOWER VASCULAR RIGHT COMMON FEMORAL PHASIC: NORMAL
BH CV LOWER VASCULAR RIGHT COMMON FEMORAL SPONT: NORMAL
BILIRUB SERPL-MCNC: 0.7 MG/DL (ref 0–1.2)
BUN SERPL-MCNC: 17 MG/DL (ref 8–23)
BUN/CREAT SERPL: 12.3 (ref 7–25)
CALCIUM SPEC-SCNC: 9.5 MG/DL (ref 8.6–10.5)
CHLORIDE SERPL-SCNC: 100 MMOL/L (ref 98–107)
CO2 SERPL-SCNC: 23 MMOL/L (ref 22–29)
CREAT SERPL-MCNC: 1.38 MG/DL (ref 0.76–1.27)
DEPRECATED RDW RBC AUTO: 42.4 FL (ref 37–54)
EOSINOPHIL # BLD AUTO: 0.1 10*3/MM3 (ref 0–0.4)
EOSINOPHIL NFR BLD AUTO: 2.3 % (ref 0.3–6.2)
ERYTHROCYTE [DISTWIDTH] IN BLOOD BY AUTOMATED COUNT: 13.1 % (ref 12.3–15.4)
GFR SERPL CREATININE-BSD FRML MDRD: 51 ML/MIN/1.73
GLOBULIN UR ELPH-MCNC: 3.2 GM/DL
GLUCOSE SERPL-MCNC: 133 MG/DL (ref 65–99)
HCT VFR BLD AUTO: 43.8 % (ref 37.5–51)
HGB BLD-MCNC: 15.2 G/DL (ref 13–17.7)
HOLD SPECIMEN: NORMAL
INR PPP: 1.05 (ref 0.93–1.1)
LYMPHOCYTES # BLD AUTO: 1 10*3/MM3 (ref 0.7–3.1)
LYMPHOCYTES NFR BLD AUTO: 20.7 % (ref 19.6–45.3)
MAXIMAL PREDICTED HEART RATE: 151 BPM
MCH RBC QN AUTO: 31.4 PG (ref 26.6–33)
MCHC RBC AUTO-ENTMCNC: 34.8 G/DL (ref 31.5–35.7)
MCV RBC AUTO: 90.3 FL (ref 79–97)
MONOCYTES # BLD AUTO: 0.4 10*3/MM3 (ref 0.1–0.9)
MONOCYTES NFR BLD AUTO: 8.5 % (ref 5–12)
NEUTROPHILS NFR BLD AUTO: 3.3 10*3/MM3 (ref 1.7–7)
NEUTROPHILS NFR BLD AUTO: 68 % (ref 42.7–76)
NRBC BLD AUTO-RTO: 0.1 /100 WBC (ref 0–0.2)
PLATELET # BLD AUTO: 154 10*3/MM3 (ref 140–450)
PMV BLD AUTO: 8.2 FL (ref 6–12)
POTASSIUM SERPL-SCNC: 4.2 MMOL/L (ref 3.5–5.2)
PROT SERPL-MCNC: 7.3 G/DL (ref 6–8.5)
PROTHROMBIN TIME: 11.6 SECONDS (ref 9.6–11.7)
RBC # BLD AUTO: 4.85 10*6/MM3 (ref 4.14–5.8)
SODIUM SERPL-SCNC: 133 MMOL/L (ref 136–145)
STRESS TARGET HR: 128 BPM
WBC NRBC COR # BLD: 4.9 10*3/MM3 (ref 3.4–10.8)

## 2022-01-11 PROCEDURE — 99284 EMERGENCY DEPT VISIT MOD MDM: CPT

## 2022-01-11 PROCEDURE — 36415 COLL VENOUS BLD VENIPUNCTURE: CPT

## 2022-01-11 PROCEDURE — 85610 PROTHROMBIN TIME: CPT

## 2022-01-11 PROCEDURE — 80053 COMPREHEN METABOLIC PANEL: CPT

## 2022-01-11 PROCEDURE — 85025 COMPLETE CBC W/AUTO DIFF WBC: CPT

## 2022-01-11 PROCEDURE — 85730 THROMBOPLASTIN TIME PARTIAL: CPT

## 2022-01-11 PROCEDURE — 93971 EXTREMITY STUDY: CPT

## 2022-01-11 RX ORDER — SODIUM CHLORIDE 0.9 % (FLUSH) 0.9 %
10 SYRINGE (ML) INJECTION AS NEEDED
Status: DISCONTINUED | OUTPATIENT
Start: 2022-01-11 | End: 2022-01-11 | Stop reason: HOSPADM

## 2022-01-11 NOTE — DISCHARGE INSTRUCTIONS
Monitor for worsening symptoms such as numbness, weakness, color change.  Return to the ER if you experience these symptoms.

## 2022-01-11 NOTE — ED PROVIDER NOTES
Subjective   Chief Complaint: Left lower extremity tingling    HPI: Patient is a 71-year-old male who presents to the ER today by private vehicle complaining of left lower extremity tingling.  He states that he noticed it yesterday when he woke up at approximately 8 AM.  He states he went to bed the night before at approximately 9:00.  He reports it has not hindered his ability to walk but states that it continually feels asleep, begins at his knee and extends down into his foot.  He is attempted massaging as well as different creams to help with the sensation change.  Patient reports he does have a history of blood clot in his posterior knee on that side following a gastric surgery in 2015.  He also states that he had a 5 way bypass in 2019 and the left upper leg vessels were used.  He states he is generally pretty active but has been more sedentary in the last several months due to season change.  He has had no chest pain or shortness of breath no weakness.  He felt that his left lower extremity was a little puffy last night before bed.  Patient has no focal deficits.     He is not currently on blood thinners.    PCP: [place]          Review of Systems   Constitutional: Negative.    HENT: Negative.    Eyes: Negative.    Respiratory: Negative for cough and shortness of breath.    Cardiovascular: Negative for chest pain.   Gastrointestinal: Negative.    Endocrine: Negative.    Musculoskeletal: Negative for arthralgias, joint swelling and myalgias.   Skin: Negative.    Neurological: Negative for dizziness and weakness.        Tingling to left lower extremity   Hematological: Negative.    Psychiatric/Behavioral: Negative.        Past Medical History:   Diagnosis Date   • Abnormal nuclear stress test 6/22/2019   • Arthritis    • Chronic deep vein thrombosis (DVT) of left lower extremity (CMS/HCC)    • Chronic kidney disease (CKD), stage II (mild)    • Coronary artery disease    • DVT (deep venous thrombosis) (CMS/HCC)     • Hypertension    • BHATTI (nonalcoholic steatohepatitis) 06/2019   • Unstable angina (CMS/HCC) 6/22/2019       No Known Allergies    Past Surgical History:   Procedure Laterality Date   • CARDIAC CATHETERIZATION N/A 6/22/2019    Procedure: LEFT HEART CATH with possible PCI;  Surgeon: Enmanuel Wild MD;  Location: [facility] CATH INVASIVE LOCATION;  Service: Cardiovascular   • COLON SURGERY     • CORONARY ARTERY BYPASS GRAFT N/A 6/24/2019    Procedure: CABG;  Surgeon: Jose Angel López MD;  Location: [facility] CVOR;  Service: Cardiothoracic   • LIVER BIOPSY  06/2019       Family History   Problem Relation Age of Onset   • Heart disease Father        Social History     Socioeconomic History   • Marital status:    Tobacco Use   • Smoking status: Never Smoker   • Smokeless tobacco: Never Used   Vaping Use   • Vaping Use: Never used   Substance and Sexual Activity   • Alcohol use: No   • Drug use: No   • Sexual activity: Defer           Objective   Physical Exam  Vitals reviewed.   Constitutional:       Appearance: He is not ill-appearing or toxic-appearing.   Eyes:      Extraocular Movements: Extraocular movements intact.      Pupils: Pupils are equal, round, and reactive to light.   Cardiovascular:      Rate and Rhythm: Normal rate and regular rhythm.      Pulses:           Dorsalis pedis pulses are 2+ on the right side and 1+ on the left side.      Heart sounds: Normal heart sounds. No murmur heard.      Pulmonary:      Effort: Pulmonary effort is normal.      Breath sounds: Normal breath sounds. No wheezing.   Abdominal:      General: Bowel sounds are normal.      Palpations: Abdomen is soft.      Tenderness: There is no abdominal tenderness.   Musculoskeletal:         General: No tenderness or deformity. Normal range of motion.   Skin:     General: Skin is warm and dry.      Capillary Refill: Capillary refill takes less than 2 seconds.      Findings: No bruising.   Neurological:      General: No focal  "deficit present.      Mental Status: He is alert and oriented to person, place, and time.      Cranial Nerves: No dysarthria or facial asymmetry.      Sensory: Sensory deficit present.      Motor: No weakness or abnormal muscle tone.      Coordination: Romberg sign negative. Finger-Nose-Finger Test and Heel to Shin Test normal.      Gait: Gait normal.      Deep Tendon Reflexes: Babinski sign absent on the right side. Babinski sign absent on the left side.   Psychiatric:         Mood and Affect: Mood normal.         Behavior: Behavior normal.         Thought Content: Thought content normal.         Judgment: Judgment normal.         Procedures           ED Course  ED Course as of 01/11/22 1108   Tue Jan 11, 2022   0957 Radiology tech reports a vascular Doppler was negative  []      ED Course User Index  [] Isadora Mukherjee APRN      /78   Pulse 72   Temp 97.6 °F (36.4 °C) (Oral)   Resp 18   Ht 172.7 cm (68\")   Wt 88.9 kg (196 lb)   SpO2 96%   BMI 29.80 kg/m²   Labs Reviewed   COMPREHENSIVE METABOLIC PANEL - Abnormal; Notable for the following components:       Result Value    Glucose 133 (*)     Creatinine 1.38 (*)     Sodium 133 (*)     eGFR Non  Amer 51 (*)     All other components within normal limits    Narrative:     GFR Normal >60  Chronic Kidney Disease <60  Kidney Failure <15     PROTIME-INR - Normal   APTT - Normal   CBC WITH AUTO DIFFERENTIAL - Normal   CBC AND DIFFERENTIAL    Narrative:     The following orders were created for panel order CBC & Differential.  Procedure                               Abnormality         Status                     ---------                               -----------         ------                     CBC Auto Differential[[id number]]        Normal              Final result                 Please view results for these tests on the individual orders.   EXTRA TUBES    Narrative:     The following orders were created for panel order Extra " "Tubes.  Procedure                               Abnormality         Status                     ---------                               -----------         ------                     Gold Top - SST[[id number]]                                   Final result                 Please view results for these tests on the individual orders.   GOLD TOP - SST     Medications   sodium chloride 0.9 % flush 10 mL (has no administration in time range)     No radiology results for the last day                                             MDM  Number of Diagnoses or Management Options  Paresthesia of left leg  Diagnosis management comments: While in the emergency room an IV was established and labs were obtained.  The results were essentially normal for this patient.  Vascular doppler of left lower extremity was negative for DVT or SVT.  The patient is able to ambulate independently without difficulty, bilateral lower extremities are pink, warm, and pulses noted.  There is not signs of focal deficits, CVA is felt to be less likely.  Patient case was discussed with Dr. Neal who advised patient to be discharged and follow up with spine.  On reassessment pulses were equal and strong in lower extremity, potential causes were discussed with patient and he states he did feel that he \"tweeked\" his back recently and symptoms began shortly after this.      Patient was educated on signs and symptoms to monitor for and when to return to the emergency room.  Patient gave verbal understanding of these instructions.     Chart review:  6/22/2019  Duplex mapping lower extremity  •   Right greater saphenous vein above knee:  with inadequate size.    •   Right greater saphenous vein below knee:  with inadequate size.    •   Left greater saphenous vein above knee:  with adequate size.    •   Left greater saphenous vein below knee:  with inadequate size.    6/22/2019   Echo  · The left ventricular cavity is mildly dilated.  · Left ventricular wall " thickness is consistent with mild-to-moderate concentric hypertrophy.  · Left ventricular systolic function is low normal.  · Left ventricular diastolic dysfunction (grade I a) consistent with impaired relaxation.  · Left atrial cavity size is mild-to-moderately dilated.        Comorbidities: Hypertension, Arthritis, Chronic DVT, Coronary artery disease, BHATTI, CKD, Hyperlipidemia     Differentials: DVT, SVT, paresthesias not all inclusive of differentials considered      Appropriate PPE worn during exam.        Patient Progress  Patient progress: stable      Final diagnoses:   Paresthesia of left leg       ED Disposition  ED Disposition     ED Disposition Condition Comment    Discharge Stable           Dwain Peter MD  [address] [facility] [place] [address] [phone]    Schedule an appointment as soon as possible for a visit in 1 week      Dwain Silva IV, MD  [address] [place] [address] [place] [address] [phone]    Schedule an appointment as soon as possible for a visit in 1 week  As needed, If symptoms worsen         Medication List      No changes were made to your prescriptions during this visit.          Isadora Mukherjee, APRN  01/11/22 1108

## 2022-01-28 ENCOUNTER — LAB (OUTPATIENT)
Dept: LAB | Facility: HOSPITAL | Age: 72
End: 2022-01-28

## 2022-01-28 ENCOUNTER — TRANSCRIBE ORDERS (OUTPATIENT)
Dept: ADMINISTRATIVE | Facility: HOSPITAL | Age: 72
End: 2022-01-28

## 2022-01-28 DIAGNOSIS — N18.31 CHRONIC KIDNEY DISEASE (CKD) STAGE G3A/A1, MODERATELY DECREASED GLOMERULAR FILTRATION RATE (GFR) BETWEEN 45-59 ML/MIN/1.73 SQUARE METER AND ALBUMINURIA CREATININE RATIO LESS THAN 30 MG/G (CMS/H*: Primary | ICD-10-CM

## 2022-01-28 DIAGNOSIS — N18.31 CHRONIC KIDNEY DISEASE (CKD) STAGE G3A/A1, MODERATELY DECREASED GLOMERULAR FILTRATION RATE (GFR) BETWEEN 45-59 ML/MIN/1.73 SQUARE METER AND ALBUMINURIA CREATININE RATIO LESS THAN 30 MG/G (CMS/H*: ICD-10-CM

## 2022-01-28 LAB
ANION GAP SERPL CALCULATED.3IONS-SCNC: 10 MMOL/L (ref 5–15)
BACTERIA UR QL AUTO: NORMAL /HPF
BILIRUB UR QL STRIP: NEGATIVE
BUN SERPL-MCNC: 19 MG/DL (ref 8–23)
BUN/CREAT SERPL: 12.8 (ref 7–25)
CALCIUM SPEC-SCNC: 9.3 MG/DL (ref 8.6–10.5)
CHLORIDE SERPL-SCNC: 105 MMOL/L (ref 98–107)
CLARITY UR: CLEAR
CO2 SERPL-SCNC: 24 MMOL/L (ref 22–29)
COLOR UR: YELLOW
CREAT SERPL-MCNC: 1.48 MG/DL (ref 0.76–1.27)
CREAT UR-MCNC: 90 MG/DL
GFR SERPL CREATININE-BSD FRML MDRD: 47 ML/MIN/1.73
GLUCOSE SERPL-MCNC: 103 MG/DL (ref 65–99)
GLUCOSE UR STRIP-MCNC: NEGATIVE MG/DL
HGB UR QL STRIP.AUTO: NEGATIVE
HYALINE CASTS UR QL AUTO: NORMAL /LPF
KETONES UR QL STRIP: NEGATIVE
LEUKOCYTE ESTERASE UR QL STRIP.AUTO: NEGATIVE
NITRITE UR QL STRIP: NEGATIVE
PH UR STRIP.AUTO: 5.5 [PH] (ref 5–8)
POTASSIUM SERPL-SCNC: 3.8 MMOL/L (ref 3.5–5.2)
PROT ?TM UR-MCNC: 61 MG/DL
PROT UR QL STRIP: ABNORMAL
PROT/CREAT UR: 677.8 MG/G CREA (ref 0–200)
RBC # UR STRIP: NORMAL /HPF
REF LAB TEST METHOD: NORMAL
SODIUM SERPL-SCNC: 139 MMOL/L (ref 136–145)
SP GR UR STRIP: 1.02 (ref 1–1.03)
SQUAMOUS #/AREA URNS HPF: NORMAL /HPF
UROBILINOGEN UR QL STRIP: ABNORMAL
WBC # UR STRIP: NORMAL /HPF

## 2022-01-28 PROCEDURE — 86334 IMMUNOFIX E-PHORESIS SERUM: CPT

## 2022-01-28 PROCEDURE — 36415 COLL VENOUS BLD VENIPUNCTURE: CPT

## 2022-01-28 PROCEDURE — 80048 BASIC METABOLIC PNL TOTAL CA: CPT

## 2022-01-28 PROCEDURE — 81001 URINALYSIS AUTO W/SCOPE: CPT

## 2022-01-28 PROCEDURE — 82570 ASSAY OF URINE CREATININE: CPT

## 2022-01-28 PROCEDURE — 84156 ASSAY OF PROTEIN URINE: CPT

## 2022-01-28 PROCEDURE — 82784 ASSAY IGA/IGD/IGG/IGM EACH: CPT

## 2022-01-31 LAB
IGA SERPL-MCNC: 343 MG/DL (ref 61–437)
IGG SERPL-MCNC: 1240 MG/DL (ref 603–1613)
IGM SERPL-MCNC: 87 MG/DL (ref 20–172)
PROT PATTERN SERPL IFE-IMP: NORMAL

## 2022-02-07 RX ORDER — LISINOPRIL 10 MG/1
10 TABLET ORAL DAILY
Qty: 30 TABLET | Refills: 1 | Status: SHIPPED | OUTPATIENT
Start: 2022-02-07 | End: 2022-02-08 | Stop reason: SDUPTHER

## 2022-02-08 RX ORDER — LISINOPRIL 10 MG/1
10 TABLET ORAL DAILY
Qty: 90 TABLET | Refills: 3 | Status: SHIPPED | OUTPATIENT
Start: 2022-02-08 | End: 2023-01-04 | Stop reason: HOSPADM

## 2022-03-14 RX ORDER — ASPIRIN 81 MG/1
1 TABLET, CHEWABLE ORAL DAILY
COMMUNITY

## 2022-03-14 RX ORDER — LISINOPRIL 10 MG/1
1 TABLET ORAL DAILY
COMMUNITY
End: 2022-03-16 | Stop reason: SDUPTHER

## 2022-03-14 RX ORDER — MELATONIN
1 DAILY
COMMUNITY

## 2022-03-14 RX ORDER — ATORVASTATIN CALCIUM 40 MG/1
1 TABLET, FILM COATED ORAL DAILY
COMMUNITY
End: 2022-03-16 | Stop reason: SDUPTHER

## 2022-03-16 ENCOUNTER — OFFICE VISIT (OUTPATIENT)
Dept: CARDIOLOGY | Facility: CLINIC | Age: 72
End: 2022-03-16

## 2022-03-16 VITALS
SYSTOLIC BLOOD PRESSURE: 127 MMHG | HEIGHT: 68 IN | BODY MASS INDEX: 29.7 KG/M2 | HEART RATE: 69 BPM | RESPIRATION RATE: 18 BRPM | WEIGHT: 196 LBS | OXYGEN SATURATION: 98 % | DIASTOLIC BLOOD PRESSURE: 80 MMHG

## 2022-03-16 DIAGNOSIS — Z95.1 S/P CABG (CORONARY ARTERY BYPASS GRAFT): Primary | ICD-10-CM

## 2022-03-16 DIAGNOSIS — I10 BENIGN ESSENTIAL HTN: Chronic | ICD-10-CM

## 2022-03-16 DIAGNOSIS — R94.31 ABNORMAL EKG: ICD-10-CM

## 2022-03-16 DIAGNOSIS — I25.110 CORONARY ARTERY DISEASE INVOLVING NATIVE CORONARY ARTERY OF NATIVE HEART WITH UNSTABLE ANGINA PECTORIS: ICD-10-CM

## 2022-03-16 DIAGNOSIS — I10 ESSENTIAL HYPERTENSION: ICD-10-CM

## 2022-03-16 DIAGNOSIS — E78.2 MIXED HYPERLIPIDEMIA: ICD-10-CM

## 2022-03-16 PROCEDURE — 93000 ELECTROCARDIOGRAM COMPLETE: CPT | Performed by: INTERNAL MEDICINE

## 2022-03-16 PROCEDURE — 99214 OFFICE O/P EST MOD 30 MIN: CPT | Performed by: INTERNAL MEDICINE

## 2022-03-16 RX ORDER — LORATADINE 10 MG/1
1 CAPSULE, LIQUID FILLED ORAL
COMMUNITY
End: 2023-01-03

## 2022-03-16 NOTE — PROGRESS NOTES
Cardiology Office Visit      Encounter Date:  03/16/2022    Patient ID:   Chris Tinsley is a 71 y.o. male.    Reason For Followup:  Coronary artery disease  Hypertension  Hyperlipidemia  Chronic renal insufficiency    Brief Clinical History:  Dear Dr. Peter, Dwain Bello MD    I had the pleasure of seeing Chris Tinsley today. As you are well aware, this is a 71 y.o. male who presented to [facility] with c/o dizziness, SOA, and diaphoresis.  He ruled out for acute coronary syndrome.  Cardiology was consulted and pt had stress test that revealed prior MI and miles-infarct ischemia.  Pt had subsequent cardiac cath revealing MV CAD, EF 50%.   On 6/24/2019, he underwent CABG x5.         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 twice daily Lipitor 40 mg p.o. once a day aspirin 81 mg p.o. once a day lisinopril 10 mg p.o. once a day  Continued aggressive risk factor modification  Follow-up in office in 6 months    Objective:    Vitals:  Vitals:    03/16/22  "1409   BP: 127/80   BP Location: Left arm   Patient Position: Sitting   Cuff Size: Large Adult   Pulse: 69   Resp: 18   SpO2: 98%   Weight: 88.9 kg (196 lb)   Height: 172.7 cm (68\")       Physical Exam:    General: Alert, cooperative, no distress, appears stated age  Head:  Normocephalic, atraumatic, mucous membranes moist  Eyes:  Conjunctiva/corneas clear, EOM's intact     Neck:  Supple,  no adenopathy;      Lungs: Clear to auscultation bilaterally, no wheezes rhonchi rales are noted  Chest wall: No tenderness  Heart::  Regular rate and rhythm, S1 and S2 normal, no murmur, rub or gallop  Abdomen: Soft, non-tender, nondistended bowel sounds active  Extremities: No cyanosis, clubbing, or edema  Pulses: 2+ and symmetric all extremities  Skin:  No rashes or lesions  Neuro/psych: A&O x3. CN II through XII are grossly intact with appropriate affect      Allergies:  No Known Allergies    Medication Review:     Current Outpatient Medications:   •  aspirin 81 MG chewable tablet, Chew 1 tablet Daily., Disp: , Rfl:   •  atorvastatin (LIPITOR) 40 MG tablet, TAKE 1 TABLET BY MOUTH EVERY NIGHT, Disp: 90 tablet, Rfl: 1  •  cholecalciferol (VITAMIN D3) 25 MCG (1000 UT) tablet, Take 1 tablet by mouth Daily., Disp: , Rfl:   •  diclofenac (VOLTAREN) 1 % gel gel, Apply 4 g topically to the appropriate area as directed 3 (Three) Times a Day As Needed. Apply 1 gm to feet as needed, Disp: , Rfl:   •  guaiFENesin (MUCINEX) 600 MG 12 hr tablet, Take 2 tablets by mouth Every 12 (Twelve) Hours., Disp: 120 tablet, Rfl: 1  •  lisinopril (PRINIVIL,ZESTRIL) 10 MG tablet, Take 1 tablet by mouth Daily., Disp: 90 tablet, Rfl: 3  •  Loratadine 10 MG capsule, Take 1 capsule by mouth., Disp: , Rfl:   •  metoprolol tartrate (LOPRESSOR) 25 MG tablet, TAKE 1 TABLET BY MOUTH EVERY 12 HOURS, Disp: 180 tablet, Rfl: 1  •  Omega-3 Fatty Acids (FISH OIL) 1000 MG capsule capsule, Take  by mouth Daily With Breakfast., Disp: , Rfl:   •  tamsulosin (FLOMAX) 0.4 MG " capsule 24 hr capsule, Take 1 capsule by mouth every night at bedtime., Disp: , Rfl: 0  •  triazolam (HALCION) 0.25 MG tablet, Take 0.25 mg by mouth At Night As Needed for Sleep. Triazolam 0.25mg 1 tab - 2 tab at bedtime PRN, Disp: , Rfl:     Family History:  Family History   Problem Relation Age of Onset   • Heart disease Father        Past Medical History:  Past Medical History:   Diagnosis Date   • Abnormal nuclear stress test 6/22/2019   • Arthritis    • Chronic deep vein thrombosis (DVT) of left lower extremity (HCC)    • Chronic kidney disease (CKD), stage II (mild)    • Coronary artery disease    • DVT (deep venous thrombosis) (HCC)    • Hypertension    • BHATTI (nonalcoholic steatohepatitis) 06/2019   • Unstable angina (HCC) 6/22/2019       Past surgical History:  Past Surgical History:   Procedure Laterality Date   • CARDIAC CATHETERIZATION N/A 6/22/2019    Procedure: LEFT HEART CATH with possible PCI;  Surgeon: Enmanuel Wild MD;  Location: [facility] CATH INVASIVE LOCATION;  Service: Cardiovascular   • COLON SURGERY     • CORONARY ARTERY BYPASS GRAFT N/A 6/24/2019    Procedure: CABG;  Surgeon: Jose Angel López MD;  Location: [facility] CVOR;  Service: Cardiothoracic   • LIVER BIOPSY  06/2019       Social History:  Social History     Socioeconomic History   • Marital status:    Tobacco Use   • Smoking status: Never Smoker   • Smokeless tobacco: Never Used   Vaping Use   • Vaping Use: Never used   Substance and Sexual Activity   • Alcohol use: No   • Drug use: No   • Sexual activity: Defer       Review of Systems:  The following systems were reviewed as they relate to the cardiovascular system: Constitutional, Eyes, ENT, Cardiovascular, Respiratory, Gastrointestinal, Integumentary, Neurological, Psychiatric, Hematologic, Endocrine, Musculoskeletal, and Genitourinary. The pertinent cardiovascular findings are reported above with all other cardiovascular points within those systems being  negative.    Diagnostic Study Review:     Current Electrocardiogram:    ECG 12 Lead    Date/Time: 3/16/2022 2:28 PM  Performed by: Enmanuel Wild MD  Authorized by: Enmanuel Wild MD   Comparison: compared with previous ECG   Similar to previous ECG  Rhythm: sinus rhythm  Rate: normal  BPM: 66  Conduction: conduction normal  QRS axis: normal  Other findings: non-specific ST-T wave changes    Clinical impression: abnormal EKG  Comments: Nonspecific inferior Q waves              NOTE: The following portions of the patient's history were reviewed and updated this visit as appropriate: allergies, current medications, past family history, past medical history, past social history, past surgical history and problem list.

## 2022-04-01 RX ORDER — ATORVASTATIN CALCIUM 40 MG/1
TABLET, FILM COATED ORAL
Qty: 90 TABLET | Refills: 1 | Status: SHIPPED | OUTPATIENT
Start: 2022-04-01 | End: 2022-09-27

## 2022-07-28 ENCOUNTER — LAB (OUTPATIENT)
Dept: LAB | Facility: HOSPITAL | Age: 72
End: 2022-07-28

## 2022-07-28 ENCOUNTER — TRANSCRIBE ORDERS (OUTPATIENT)
Dept: ADMINISTRATIVE | Facility: HOSPITAL | Age: 72
End: 2022-07-28

## 2022-07-28 DIAGNOSIS — I10 HYPERTENSION, ESSENTIAL: ICD-10-CM

## 2022-07-28 DIAGNOSIS — N18.31 STAGE 3A CHRONIC KIDNEY DISEASE: Primary | ICD-10-CM

## 2022-07-28 DIAGNOSIS — N18.31 STAGE 3A CHRONIC KIDNEY DISEASE: ICD-10-CM

## 2022-07-28 DIAGNOSIS — R80.9 PROTEINURIA, UNSPECIFIED TYPE: ICD-10-CM

## 2022-07-28 DIAGNOSIS — N40.0 ENLARGED PROSTATE: ICD-10-CM

## 2022-07-28 LAB
ALBUMIN SERPL-MCNC: 4.3 G/DL (ref 3.5–5.2)
ANION GAP SERPL CALCULATED.3IONS-SCNC: 9.7 MMOL/L (ref 5–15)
BACTERIA UR QL AUTO: NORMAL /HPF
BILIRUB UR QL STRIP: NEGATIVE
BUN SERPL-MCNC: 17 MG/DL (ref 8–23)
BUN/CREAT SERPL: 12.2 (ref 7–25)
CALCIUM SPEC-SCNC: 9.2 MG/DL (ref 8.6–10.5)
CHLORIDE SERPL-SCNC: 105 MMOL/L (ref 98–107)
CLARITY UR: CLEAR
CO2 SERPL-SCNC: 26.3 MMOL/L (ref 22–29)
COLOR UR: YELLOW
CREAT SERPL-MCNC: 1.39 MG/DL (ref 0.76–1.27)
CREAT UR-MCNC: 122.9 MG/DL
EGFRCR SERPLBLD CKD-EPI 2021: 54.5 ML/MIN/1.73
GLUCOSE SERPL-MCNC: 107 MG/DL (ref 65–99)
GLUCOSE UR STRIP-MCNC: NEGATIVE MG/DL
HGB UR QL STRIP.AUTO: NEGATIVE
HYALINE CASTS UR QL AUTO: NORMAL /LPF
KETONES UR QL STRIP: NEGATIVE
LEUKOCYTE ESTERASE UR QL STRIP.AUTO: NEGATIVE
NITRITE UR QL STRIP: NEGATIVE
PH UR STRIP.AUTO: 5.5 [PH] (ref 5–8)
PHOSPHATE SERPL-MCNC: 2.4 MG/DL (ref 2.5–4.5)
POTASSIUM SERPL-SCNC: 4.3 MMOL/L (ref 3.5–5.2)
PROT ?TM UR-MCNC: 69.9 MG/DL
PROT UR QL STRIP: ABNORMAL
PROT/CREAT UR: 568.8 MG/G CREA (ref 0–200)
RBC # UR STRIP: NORMAL /HPF
REF LAB TEST METHOD: NORMAL
SODIUM SERPL-SCNC: 141 MMOL/L (ref 136–145)
SP GR UR STRIP: 1.02 (ref 1–1.03)
SQUAMOUS #/AREA URNS HPF: NORMAL /HPF
UROBILINOGEN UR QL STRIP: ABNORMAL
WBC # UR STRIP: NORMAL /HPF

## 2022-07-28 PROCEDURE — 81001 URINALYSIS AUTO W/SCOPE: CPT

## 2022-07-28 PROCEDURE — 36415 COLL VENOUS BLD VENIPUNCTURE: CPT

## 2022-07-28 PROCEDURE — 84156 ASSAY OF PROTEIN URINE: CPT

## 2022-07-28 PROCEDURE — 80069 RENAL FUNCTION PANEL: CPT

## 2022-07-28 PROCEDURE — 82570 ASSAY OF URINE CREATININE: CPT

## 2022-09-27 RX ORDER — ATORVASTATIN CALCIUM 40 MG/1
TABLET, FILM COATED ORAL
Qty: 90 TABLET | Refills: 1 | Status: SHIPPED | OUTPATIENT
Start: 2022-09-27 | End: 2023-03-27

## 2022-10-24 ENCOUNTER — TELEPHONE (OUTPATIENT)
Dept: CARDIOLOGY | Facility: CLINIC | Age: 72
End: 2022-10-24

## 2022-10-24 NOTE — TELEPHONE ENCOUNTER
Caller: Chris Tinsley    Relationship: Self    Best call back number: [phone]    What is the best time to reach you: ANY AFTER 9AM    Who are you requesting to speak with (clinical staff, provider,  specific staff member): ANY    What was the call regarding: PT CALLED TO RESCHEDULE HIS 10.26.22 APPT DUE TO HAVING A CONFLICTING APPT - PT REPORTS FEELING GREAT, AND WOULD LIKE TO START HAVING YEARLY APPTS IF POSSIBLE - HUB RESCHEDULED FOR FIRST AVAIL 02.15.22 - PT SAID TO CALL BACK IF HE NEEDS SOONER - OTHERWISE HE IS GOOD TO KEEP THE FEB APPT    Do you require a callback: IF NECESSARY

## 2023-01-03 ENCOUNTER — APPOINTMENT (OUTPATIENT)
Dept: GENERAL RADIOLOGY | Facility: HOSPITAL | Age: 73
End: 2023-01-03
Payer: COMMERCIAL

## 2023-01-03 ENCOUNTER — HOSPITAL ENCOUNTER (OUTPATIENT)
Facility: HOSPITAL | Age: 73
Setting detail: OBSERVATION
Discharge: HOME | End: 2023-01-04
Attending: EMERGENCY MEDICINE | Admitting: EMERGENCY MEDICINE
Payer: COMMERCIAL

## 2023-01-03 DIAGNOSIS — M62.838 MUSCLE SPASMS OF NECK: ICD-10-CM

## 2023-01-03 DIAGNOSIS — R07.89 CHEST TIGHTNESS: Primary | ICD-10-CM

## 2023-01-03 DIAGNOSIS — R20.2 LEFT HAND PARESTHESIA: ICD-10-CM

## 2023-01-03 LAB
ALBUMIN SERPL-MCNC: 4.2 G/DL (ref 3.5–5.2)
ALBUMIN/GLOB SERPL: 1.5 G/DL
ALP SERPL-CCNC: 90 U/L (ref 39–117)
ALT SERPL W P-5'-P-CCNC: 17 U/L (ref 1–41)
ANION GAP SERPL CALCULATED.3IONS-SCNC: 11 MMOL/L (ref 5–15)
AST SERPL-CCNC: 17 U/L (ref 1–40)
BASOPHILS # BLD AUTO: 0 10*3/MM3 (ref 0–0.2)
BASOPHILS NFR BLD AUTO: 0.3 % (ref 0–1.5)
BILIRUB SERPL-MCNC: 0.3 MG/DL (ref 0–1.2)
BUN SERPL-MCNC: 28 MG/DL (ref 8–23)
BUN/CREAT SERPL: 17.1 (ref 7–25)
CALCIUM SPEC-SCNC: 9.7 MG/DL (ref 8.6–10.5)
CHLORIDE SERPL-SCNC: 102 MMOL/L (ref 98–107)
CO2 SERPL-SCNC: 24 MMOL/L (ref 22–29)
CREAT SERPL-MCNC: 1.64 MG/DL (ref 0.76–1.27)
DEPRECATED RDW RBC AUTO: 45.5 FL (ref 37–54)
EGFRCR SERPLBLD CKD-EPI 2021: 44.7 ML/MIN/1.73
EOSINOPHIL # BLD AUTO: 0.1 10*3/MM3 (ref 0–0.4)
EOSINOPHIL NFR BLD AUTO: 1 % (ref 0.3–6.2)
ERYTHROCYTE [DISTWIDTH] IN BLOOD BY AUTOMATED COUNT: 13.1 % (ref 12.3–15.4)
GLOBULIN UR ELPH-MCNC: 2.8 GM/DL
GLUCOSE SERPL-MCNC: 121 MG/DL (ref 65–99)
HCT VFR BLD AUTO: 42.7 % (ref 37.5–51)
HGB BLD-MCNC: 14.4 G/DL (ref 13–17.7)
HOLD SPECIMEN: NORMAL
HOLD SPECIMEN: NORMAL
LYMPHOCYTES # BLD AUTO: 0.9 10*3/MM3 (ref 0.7–3.1)
LYMPHOCYTES NFR BLD AUTO: 15.5 % (ref 19.6–45.3)
MCH RBC QN AUTO: 32.1 PG (ref 26.6–33)
MCHC RBC AUTO-ENTMCNC: 33.7 G/DL (ref 31.5–35.7)
MCV RBC AUTO: 95.1 FL (ref 79–97)
MONOCYTES # BLD AUTO: 0.5 10*3/MM3 (ref 0.1–0.9)
MONOCYTES NFR BLD AUTO: 7.6 % (ref 5–12)
NEUTROPHILS NFR BLD AUTO: 4.6 10*3/MM3 (ref 1.7–7)
NEUTROPHILS NFR BLD AUTO: 75.6 % (ref 42.7–76)
NRBC BLD AUTO-RTO: 0 /100 WBC (ref 0–0.2)
PLATELET # BLD AUTO: 120 10*3/MM3 (ref 140–450)
PMV BLD AUTO: 9.1 FL (ref 6–12)
POTASSIUM SERPL-SCNC: 4.3 MMOL/L (ref 3.5–5.2)
PROT SERPL-MCNC: 7 G/DL (ref 6–8.5)
RBC # BLD AUTO: 4.49 10*6/MM3 (ref 4.14–5.8)
SODIUM SERPL-SCNC: 137 MMOL/L (ref 136–145)
TROPONIN T SERPL-MCNC: 0.01 NG/ML (ref 0–0.03)
TROPONIN T SERPL-MCNC: <0.01 NG/ML (ref 0–0.03)
WBC NRBC COR # BLD: 6.1 10*3/MM3 (ref 3.4–10.8)
WHOLE BLOOD HOLD COAG: NORMAL

## 2023-01-03 PROCEDURE — 85025 COMPLETE CBC W/AUTO DIFF WBC: CPT | Performed by: NURSE PRACTITIONER

## 2023-01-03 PROCEDURE — 80053 COMPREHEN METABOLIC PANEL: CPT | Performed by: NURSE PRACTITIONER

## 2023-01-03 PROCEDURE — 84484 ASSAY OF TROPONIN QUANT: CPT | Performed by: NURSE PRACTITIONER

## 2023-01-03 PROCEDURE — 71045 X-RAY EXAM CHEST 1 VIEW: CPT

## 2023-01-03 PROCEDURE — G0378 HOSPITAL OBSERVATION PER HR: HCPCS

## 2023-01-03 PROCEDURE — 25010000002 MORPHINE PER 10 MG: Performed by: NURSE PRACTITIONER

## 2023-01-03 PROCEDURE — 93005 ELECTROCARDIOGRAM TRACING: CPT

## 2023-01-03 PROCEDURE — 93005 ELECTROCARDIOGRAM TRACING: CPT | Performed by: NURSE PRACTITIONER

## 2023-01-03 PROCEDURE — 36415 COLL VENOUS BLD VENIPUNCTURE: CPT

## 2023-01-03 PROCEDURE — 99285 EMERGENCY DEPT VISIT HI MDM: CPT

## 2023-01-03 PROCEDURE — 93005 ELECTROCARDIOGRAM TRACING: CPT | Performed by: EMERGENCY MEDICINE

## 2023-01-03 PROCEDURE — 96374 THER/PROPH/DIAG INJ IV PUSH: CPT

## 2023-01-03 PROCEDURE — 72050 X-RAY EXAM NECK SPINE 4/5VWS: CPT

## 2023-01-03 RX ORDER — SODIUM CHLORIDE 9 MG/ML
40 INJECTION, SOLUTION INTRAVENOUS AS NEEDED
Status: DISCONTINUED | OUTPATIENT
Start: 2023-01-03 | End: 2023-01-04 | Stop reason: HOSPADM

## 2023-01-03 RX ORDER — SODIUM CHLORIDE 0.9 % (FLUSH) 0.9 %
10 SYRINGE (ML) INJECTION EVERY 12 HOURS SCHEDULED
Status: DISCONTINUED | OUTPATIENT
Start: 2023-01-03 | End: 2023-01-04 | Stop reason: HOSPADM

## 2023-01-03 RX ORDER — TEMAZEPAM 15 MG/1
15 CAPSULE ORAL ONCE
Status: COMPLETED | OUTPATIENT
Start: 2023-01-03 | End: 2023-01-03

## 2023-01-03 RX ORDER — NALOXONE HCL 0.4 MG/ML
0.4 VIAL (ML) INJECTION
Status: DISCONTINUED | OUTPATIENT
Start: 2023-01-03 | End: 2023-01-04 | Stop reason: HOSPADM

## 2023-01-03 RX ORDER — LORAZEPAM 2 MG/ML
0.5 INJECTION INTRAMUSCULAR EVERY 6 HOURS PRN
Status: DISCONTINUED | OUTPATIENT
Start: 2023-01-03 | End: 2023-01-04 | Stop reason: HOSPADM

## 2023-01-03 RX ORDER — TAMSULOSIN HYDROCHLORIDE 0.4 MG/1
1 CAPSULE ORAL NIGHTLY
COMMUNITY

## 2023-01-03 RX ORDER — TIZANIDINE 4 MG/1
4 TABLET ORAL EVERY 8 HOURS PRN
Status: DISCONTINUED | OUTPATIENT
Start: 2023-01-03 | End: 2023-01-04 | Stop reason: HOSPADM

## 2023-01-03 RX ORDER — SODIUM CHLORIDE 0.9 % (FLUSH) 0.9 %
10 SYRINGE (ML) INJECTION AS NEEDED
Status: DISCONTINUED | OUTPATIENT
Start: 2023-01-03 | End: 2023-01-04 | Stop reason: HOSPADM

## 2023-01-03 RX ORDER — ASPIRIN 81 MG/1
324 TABLET, CHEWABLE ORAL ONCE
Status: DISCONTINUED | OUTPATIENT
Start: 2023-01-03 | End: 2023-01-04 | Stop reason: HOSPADM

## 2023-01-03 RX ORDER — MORPHINE SULFATE 2 MG/ML
1 INJECTION, SOLUTION INTRAMUSCULAR; INTRAVENOUS EVERY 4 HOURS PRN
Status: DISCONTINUED | OUTPATIENT
Start: 2023-01-03 | End: 2023-01-04 | Stop reason: HOSPADM

## 2023-01-03 RX ORDER — ATORVASTATIN CALCIUM 40 MG/1
40 TABLET, FILM COATED ORAL ONCE
Status: COMPLETED | OUTPATIENT
Start: 2023-01-03 | End: 2023-01-03

## 2023-01-03 RX ORDER — TAMSULOSIN HYDROCHLORIDE 0.4 MG/1
0.4 CAPSULE ORAL ONCE
Status: COMPLETED | OUTPATIENT
Start: 2023-01-03 | End: 2023-01-03

## 2023-01-03 RX ADMIN — Medication 10 ML: at 21:56

## 2023-01-03 RX ADMIN — ATORVASTATIN CALCIUM 40 MG: 40 TABLET, FILM COATED ORAL at 22:40

## 2023-01-03 RX ADMIN — TAMSULOSIN HYDROCHLORIDE 0.4 MG: 0.4 CAPSULE ORAL at 22:40

## 2023-01-03 RX ADMIN — TEMAZEPAM 15 MG: 15 CAPSULE ORAL at 22:40

## 2023-01-03 RX ADMIN — TIZANIDINE 4 MG: 4 TABLET ORAL at 19:17

## 2023-01-03 RX ADMIN — MORPHINE SULFATE 1 MG: 2 INJECTION, SOLUTION INTRAMUSCULAR; INTRAVENOUS at 21:56

## 2023-01-03 NOTE — CASE MANAGEMENT/SOCIAL WORK
Discharge Planning Assessment   Peter     Patient Name: Chris Tinsley  MRN: [id number]  Today's Date: 1/3/2023    Admit Date: 1/3/2023    Plan: Return Home   Discharge Needs Assessment     Row Name 01/03/23 1750       Living Environment    People in Home spouse    Name(s) of People in Home spouse    Current Living Arrangements home    Primary Care Provided by self    Provides Primary Care For no one    Family Caregiver if Needed spouse    Quality of Family Relationships helpful    Able to Return to Prior Arrangements yes       Resource/Environmental Concerns    Transportation Concerns none       Transition Planning    Patient/Family Anticipates Transition to home with family    Transportation Anticipated family or friend will provide       Discharge Needs Assessment    Readmission Within the Last 30 Days no previous admission in last 30 days    Concerns to be Addressed discharge planning               Discharge Plan     Row Name 01/03/23 1756       Plan    Plan Return Home    Plan Comments Lives in home wht spouse, confirmed PCP and Pharmacy. IADL's, Confirmed PCP and Pharmacy. No transportation or prescription coverage issues.           Expected Discharge Date and Time     Expected Discharge Date Expected Discharge Time    Jan 4, 2023          Demographic Summary     Row Name 01/03/23 1749       General Information    Admission Type observation    Arrived From emergency department    Reason for Consult discharge planning    Preferred Language English               Functional Status     Row Name 01/03/23 1749       Functional Status    Usual Activity Tolerance good    Current Activity Tolerance good       Functional Status, IADL    Medications independent    Meal Preparation independent    Housekeeping independent    Laundry independent    Shopping independent       Mental Status    General Appearance WDL WDL         Phone communication or documentation only - no physical contact with patient or  family.      Glenys Perry RN

## 2023-01-03 NOTE — ED PROVIDER NOTES
Subjective   History of Present Illness  72-year-old male presents with a 6-day history of left hand \"numbness\" with midsternal chest \"tightness\".  He also reports left lateral neck \"spasms and charley horses\" that has been occurring for the last 3 months, but has worsened in the last 3 weeks.  He reports that it is waking him from sleep at night.  He denies recent injury but did hurt his neck when falling from a ladder 2 years prior.  He denies being a smoker reports he quit drinking alcohol approximately 7 years ago.  His history significant for 5 vessel CABG small intestine perforation and hypertension.    Cardiologist Dr. Wild    PCP Dr. Peter    1. Location: 1.  Left hand 2.  Midsternal chest 3.  Lateral musculature of neck  2. Quality: 1.  Numbness 2.  Tightness 3.  Spasm, charley horse  3. Severity: Mild to moderate  4. Worsening factors: Denies  5. Alleviating factors: 1 and 2 denies 3.  Biofreeze  6. Onset: 1 and 2.  6 days 3.  3 months  7. Radiation: Denies  8. Frequency: Intermittent  9. Co-morbidities: Past Medical History:  6/22/2019: Abnormal nuclear stress test  No date: Arthritis  No date: Chronic deep vein thrombosis (DVT) of left lower extremity   (HCC)  No date: Chronic kidney disease (CKD), stage II (mild)  No date: Coronary artery disease  No date: DVT (deep venous thrombosis) (HCC)  No date: Hypertension  06/2019: BHATTI (nonalcoholic steatohepatitis)  6/22/2019: Unstable angina (HCC)          History provided by:  Patient and spouse   used: No        Review of Systems    Past Medical History:   Diagnosis Date   • Abnormal nuclear stress test 6/22/2019   • Arthritis    • Chronic deep vein thrombosis (DVT) of left lower extremity (HCC)    • Chronic kidney disease (CKD), stage II (mild)    • Coronary artery disease    • DVT (deep venous thrombosis) (HCC)    • Hypertension    • BHATTI (nonalcoholic steatohepatitis) 06/2019   • Unstable angina (HCC) 6/22/2019       No Known  Allergies    Past Surgical History:   Procedure Laterality Date   • CARDIAC CATHETERIZATION N/A 6/22/2019    Procedure: LEFT HEART CATH with possible PCI;  Surgeon: Enmanuel Wild MD;  Location: [facility] CATH INVASIVE LOCATION;  Service: Cardiovascular   • COLON SURGERY     • CORONARY ARTERY BYPASS GRAFT N/A 6/24/2019    Procedure: CABG;  Surgeon: Jose Angel López MD;  Location: [facility] CVOR;  Service: Cardiothoracic   • LIVER BIOPSY  06/2019       Family History   Problem Relation Age of Onset   • Heart disease Father        Social History     Socioeconomic History   • Marital status:    Tobacco Use   • Smoking status: Never   • Smokeless tobacco: Never   Vaping Use   • Vaping Use: Never used   Substance and Sexual Activity   • Alcohol use: No   • Drug use: No   • Sexual activity: Defer           Objective   Physical Exam  Vitals and nursing note reviewed.   Constitutional:       General: He is not in acute distress.     Appearance: He is well-developed.   HENT:      Head: Normocephalic and atraumatic.   Eyes:      Extraocular Movements: Extraocular movements intact.      Conjunctiva/sclera: Conjunctivae normal.      Pupils: Pupils are equal, round, and reactive to light.   Neck:      Thyroid: No thyromegaly.      Vascular: No JVD.      Trachea: Trachea and phonation normal. No tracheal deviation.     Cardiovascular:      Rate and Rhythm: Normal rate and regular rhythm.      Pulses:           Radial pulses are 2+ on the right side and 2+ on the left side.        Popliteal pulses are 2+ on the right side and 2+ on the left side.        Dorsalis pedis pulses are 2+ on the right side and 2+ on the left side.        Posterior tibial pulses are 2+ on the right side and 2+ on the left side.      Heart sounds: Normal heart sounds, S1 normal and S2 normal. Heart sounds not distant. No murmur heard.    No friction rub. No gallop.   Pulmonary:      Effort: Pulmonary effort is normal. No respiratory distress.       Breath sounds: Normal breath sounds. No wheezing or rales.   Chest:      Chest wall: No tenderness.   Abdominal:      General: Bowel sounds are normal. There is no distension.      Palpations: Abdomen is soft. There is no mass.      Tenderness: There is no abdominal tenderness. There is no guarding or rebound.      Hernia: No hernia is present.   Musculoskeletal:      Cervical back: Normal range of motion and neck supple. Pain with movement and muscular tenderness present. No spinous process tenderness.   Skin:     General: Skin is warm and dry.      Capillary Refill: Capillary refill takes less than 2 seconds.   Neurological:      Mental Status: He is alert and oriented to person, place, and time.   Psychiatric:         Mood and Affect: Mood normal.         Behavior: Behavior normal.         Thought Content: Thought content normal.         Judgment: Judgment normal.         Procedures  EKG interpretation: Sinus rhythm with old inferior infarct rate of 72.  Compared to previous EKG from 6/26/2019 sinus rhythm with LVH old inferior infarct rate of 82.  Pahner and reviewed by me.         ED Course    XR Spine Cervical Complete 4 or 5 View    Result Date: 1/3/2023  Impression: 1.No acute osseous process identified. 2.Multilevel degenerative changes as described above. Electronically Signed: Chris Osorio  1/3/2023 4:31 PM EST  Workstation ID: [id number]    XR Chest 1 View    Result Date: 1/3/2023  Impression: No acute cardiopulmonary process. Electronically Signed: Chris Osorio  1/3/2023 4:30 PM EST  Workstation ID: [id number]    Medications   sodium chloride 0.9 % flush 10 mL (has no administration in time range)   aspirin chewable tablet 324 mg (324 mg Oral Not Given 1/3/23 1635)   sodium chloride 0.9 % flush 10 mL (has no administration in time range)   sodium chloride 0.9 % flush 10 mL (has no administration in time range)   sodium chloride 0.9 % infusion 40 mL (has no administration in time range)   morphine  injection 1 mg (has no administration in time range)     And   naloxone (NARCAN) injection 0.4 mg (has no administration in time range)   tiZANidine (ZANAFLEX) tablet 4 mg (4 mg Oral Given 1/3/23 1917)   LORazepam (ATIVAN) injection 0.5 mg (has no administration in time range)     Labs Reviewed   COMPREHENSIVE METABOLIC PANEL - Abnormal; Notable for the following components:       Result Value    Glucose 121 (*)     BUN 28 (*)     Creatinine 1.64 (*)     eGFR 44.7 (*)     All other components within normal limits    Narrative:     GFR Normal >60  Chronic Kidney Disease <60  Kidney Failure <15     CBC WITH AUTO DIFFERENTIAL - Abnormal; Notable for the following components:    Platelets 120 (*)     Lymphocyte % 15.5 (*)     All other components within normal limits   TROPONIN (IN-HOUSE) - Normal    Narrative:     Troponin T Reference Range:  <= 0.03 ng/mL-   Negative for AMI  >0.03 ng/mL-     Abnormal for myocardial necrosis.  Clinicians would have to utilize clinical acumen, EKG, Troponin and serial changes to determine if it is an Acute Myocardial Infarction or myocardial injury due to an underlying chronic condition.       Results may be falsely decreased if patient taking Biotin.     TROPONIN (IN-HOUSE) - Normal    Narrative:     Troponin T Reference Range:  <= 0.03 ng/mL-   Negative for AMI  >0.03 ng/mL-     Abnormal for myocardial necrosis.  Clinicians would have to utilize clinical acumen, EKG, Troponin and serial changes to determine if it is an Acute Myocardial Infarction or myocardial injury due to an underlying chronic condition.       Results may be falsely decreased if patient taking Biotin.     RAINBOW DRAW    Narrative:     The following orders were created for panel order [place] Draw.  Procedure                               Abnormality         Status                     ---------                               -----------         ------                     Green Top (Gel)[[id number]]                                   Final result               Lavender Top[[id number]]                                                                Gold Top - SST[[id number]]                                   Final result               Light Blue Top[[id number]]                                   Final result                 Please view results for these tests on the individual orders.   TROPONIN (IN-HOUSE)   CBC AND DIFFERENTIAL    Narrative:     The following orders were created for panel order CBC & Differential.  Procedure                               Abnormality         Status                     ---------                               -----------         ------                     CBC Auto Differential[[id number]]        Abnormal            Final result                 Please view results for these tests on the individual orders.   GREEN TOP   GOLD TOP - SST   LIGHT BLUE TOP                       HEART Score: 5                      Medical Decision Making  Chart Review: 8/12/2022 patient was seen by nephrology for follow-up on hypertension and CKD stage III.  Comorbidity: Past Medical History:  6/22/2019: Abnormal nuclear stress test  No date: Arthritis  No date: Chronic deep vein thrombosis (DVT) of left lower extremity   (HCC)  No date: Chronic kidney disease (CKD), stage II (mild)  No date: Coronary artery disease  No date: DVT (deep venous thrombosis) (HCC)  No date: Hypertension  06/2019: BHATTI (nonalcoholic steatohepatitis)  6/22/2019: Unstable angina (HCC)  Imaging: Was interpreted by physician and reviewed by myself: XR Chest 1 View   Final Result    Impression:    No acute cardiopulmonary process.        Electronically Signed: Chris Osorio      1/3/2023 4:30 PM EST      Workstation ID: [id number]     XR Spine Cervical Complete 4 or 5 View   Final Result    Impression:    1.No acute osseous process identified.    2.Multilevel degenerative changes as described above.        Electronically Signed: Chris Osorio      1/3/2023  4:31 PM EST      Workstation ID: [id number]    Patient undressed and placed in gown for exam.  Appropriate PPE worn during patient exam. Patient is alert and oriented x3. No acute distress.  S1-S2 heart sounds on exam.  Lungs are clear to auscultation. No edema noted to the bilateral lower extremities.  IV established and labs obtained.  Cardiac work-up initiated.  Chest x-ray obtained with the above findings.  Lab work-up was essentially unremarkable.  Patient will be placed in ED observation unit for further work-up.    Upon reassessment, he is sitting upright in bed in no acute distress.  Vital signs are stable.    Differential Diagnoses, not all-inclusive and does not constitute entirety of all causes: Cervicalgia with radiculopathy, ACS.     Disposition/Treatment: Discussed results with patient, verbalized understanding. Agreeable with plan of care.  Patient was stable upon being admitted to the ED observation unit.     Part of this note may be an electronic transcription/translation of spoken language to printed text using the Dragon Dictation System.       Chest tightness: chronic illness or injury  Left hand paresthesia: acute illness or injury  Muscle spasms of neck: acute illness or injury  Amount and/or Complexity of Data Reviewed  External Data Reviewed: notes.     Details: Assessment & Plan   1. Stage 3a chronic kidney disease (HCC)  Secondary to hypertensive nephrosclerosis. Renal function stable. Last creatinine 1.4 mg/DL. Electrolytes, volume status okay. Discussed with patient regarding importance of blood pressure and blood sugar control. Patient to avoid NSAIDs    2. Benign hypertension with chronic kidney disease  Well controlled. Continue current hypertensive    3. Proteinuria, not otherwise specified  Less than 1 g per day by urine protein/creatinine ratio and has been stable. Most likely due to hypertensive nephrosclerosis. Patient is on ACE inhibitor in form of lisinopril    4. Benign prostatic  hyperplasia  Symptoms controlled with Flomax    Orders Placed This Encounter   Renal function panel   Protein / creatinine ratio, urine   Lipid Panel   Diet No Added Salt     Return in 1 year (on 8/12/2023).      Nader Baker MD  Electronically signed by Nader Baker MD at 08/12/2022 9:45 AM EDT    Labs: ordered. Decision-making details documented in ED Course.  Radiology: ordered. Decision-making details documented in ED Course.  ECG/medicine tests: ordered. Decision-making details documented in ED Course.      Risk  OTC drugs.  Prescription drug management.  Decision regarding hospitalization.          Final diagnoses:   Chest tightness   Left hand paresthesia   Muscle spasms of neck       ED Disposition  ED Disposition     ED Disposition   Decision to Admit    Condition   --    Comment   --             No follow-up provider specified.       Medication List      No changes were made to your prescriptions during this visit.          Barbara Espinoza, APRN  01/03/23 2029

## 2023-01-04 ENCOUNTER — APPOINTMENT (OUTPATIENT)
Dept: NUCLEAR MEDICINE | Facility: HOSPITAL | Age: 73
End: 2023-01-04
Payer: COMMERCIAL

## 2023-01-04 ENCOUNTER — APPOINTMENT (OUTPATIENT)
Dept: MRI IMAGING | Facility: HOSPITAL | Age: 73
End: 2023-01-04
Payer: COMMERCIAL

## 2023-01-04 VITALS
DIASTOLIC BLOOD PRESSURE: 69 MMHG | TEMPERATURE: 97 F | HEART RATE: 70 BPM | RESPIRATION RATE: 15 BRPM | WEIGHT: 182 LBS | OXYGEN SATURATION: 96 % | SYSTOLIC BLOOD PRESSURE: 105 MMHG | HEIGHT: 68 IN | BODY MASS INDEX: 27.58 KG/M2

## 2023-01-04 LAB
ALBUMIN SERPL-MCNC: 4 G/DL (ref 3.5–5.2)
ALBUMIN/GLOB SERPL: 1.5 G/DL
ALP SERPL-CCNC: 87 U/L (ref 39–117)
ALT SERPL W P-5'-P-CCNC: 16 U/L (ref 1–41)
ANION GAP SERPL CALCULATED.3IONS-SCNC: 11 MMOL/L (ref 5–15)
AST SERPL-CCNC: 16 U/L (ref 1–40)
BASOPHILS # BLD AUTO: 0 10*3/MM3 (ref 0–0.2)
BASOPHILS NFR BLD AUTO: 0.4 % (ref 0–1.5)
BH CV NUCLEAR PRIOR STUDY: 3
BH CV REST NUCLEAR ISOTOPE DOSE: 11 MCI
BH CV STRESS BP STAGE 2: NORMAL
BH CV STRESS BP STAGE 3: NORMAL
BH CV STRESS BP STAGE 4: NORMAL
BH CV STRESS COMMENTS STAGE 1: NORMAL
BH CV STRESS COMMENTS STAGE 2: NORMAL
BH CV STRESS DOSE REGADENOSON STAGE 1: 0.4
BH CV STRESS DURATION MIN STAGE 1: 0
BH CV STRESS DURATION MIN STAGE 2: 4
BH CV STRESS DURATION SEC STAGE 1: 10
BH CV STRESS DURATION SEC STAGE 2: 0
BH CV STRESS HR STAGE 1: 90
BH CV STRESS HR STAGE 2: 90
BH CV STRESS HR STAGE 3: 81
BH CV STRESS HR STAGE 4: 79
BH CV STRESS NUCLEAR ISOTOPE DOSE: 33 MCI
BH CV STRESS PROTOCOL 1: NORMAL
BH CV STRESS RECOVERY BP: NORMAL MMHG
BH CV STRESS RECOVERY HR: 73 BPM
BH CV STRESS STAGE 1: 1
BH CV STRESS STAGE 2: 2
BH CV STRESS STAGE 3: 3
BH CV STRESS STAGE 4: 4
BILIRUB SERPL-MCNC: 0.5 MG/DL (ref 0–1.2)
BUN SERPL-MCNC: 25 MG/DL (ref 8–23)
BUN/CREAT SERPL: 16.3 (ref 7–25)
CALCIUM SPEC-SCNC: 9.4 MG/DL (ref 8.6–10.5)
CHLORIDE SERPL-SCNC: 105 MMOL/L (ref 98–107)
CHOLEST SERPL-MCNC: 130 MG/DL (ref 0–200)
CO2 SERPL-SCNC: 22 MMOL/L (ref 22–29)
CREAT SERPL-MCNC: 1.53 MG/DL (ref 0.76–1.27)
DEPRECATED RDW RBC AUTO: 45.1 FL (ref 37–54)
EGFRCR SERPLBLD CKD-EPI 2021: 48.6 ML/MIN/1.73
EOSINOPHIL # BLD AUTO: 0.1 10*3/MM3 (ref 0–0.4)
EOSINOPHIL NFR BLD AUTO: 1.9 % (ref 0.3–6.2)
ERYTHROCYTE [DISTWIDTH] IN BLOOD BY AUTOMATED COUNT: 13.6 % (ref 12.3–15.4)
GLOBULIN UR ELPH-MCNC: 2.6 GM/DL
GLUCOSE SERPL-MCNC: 101 MG/DL (ref 65–99)
HCT VFR BLD AUTO: 41.9 % (ref 37.5–51)
HDLC SERPL-MCNC: 36 MG/DL (ref 40–60)
HGB BLD-MCNC: 13.8 G/DL (ref 13–17.7)
LDLC SERPL CALC-MCNC: 66 MG/DL (ref 0–100)
LDLC/HDLC SERPL: 1.7 {RATIO}
LV EF NUC BP: 60 %
LYMPHOCYTES # BLD AUTO: 1.5 10*3/MM3 (ref 0.7–3.1)
LYMPHOCYTES NFR BLD AUTO: 26.5 % (ref 19.6–45.3)
MAXIMAL PREDICTED HEART RATE: 150 BPM
MCH RBC QN AUTO: 31.7 PG (ref 26.6–33)
MCHC RBC AUTO-ENTMCNC: 32.9 G/DL (ref 31.5–35.7)
MCV RBC AUTO: 96.1 FL (ref 79–97)
MONOCYTES # BLD AUTO: 0.7 10*3/MM3 (ref 0.1–0.9)
MONOCYTES NFR BLD AUTO: 12.5 % (ref 5–12)
NEUTROPHILS NFR BLD AUTO: 3.4 10*3/MM3 (ref 1.7–7)
NEUTROPHILS NFR BLD AUTO: 58.7 % (ref 42.7–76)
NRBC BLD AUTO-RTO: 0 /100 WBC (ref 0–0.2)
PERCENT MAX PREDICTED HR: 60 %
PLATELET # BLD AUTO: 131 10*3/MM3 (ref 140–450)
PMV BLD AUTO: 8.9 FL (ref 6–12)
POTASSIUM SERPL-SCNC: 4.4 MMOL/L (ref 3.5–5.2)
PROT SERPL-MCNC: 6.6 G/DL (ref 6–8.5)
QT INTERVAL: 427 MS
RBC # BLD AUTO: 4.37 10*6/MM3 (ref 4.14–5.8)
SODIUM SERPL-SCNC: 138 MMOL/L (ref 136–145)
STRESS BASELINE BP: NORMAL MMHG
STRESS BASELINE HR: 67 BPM
STRESS PERCENT HR: 71 %
STRESS POST PEAK BP: NORMAL MMHG
STRESS POST PEAK HR: 90 BPM
STRESS TARGET HR: 128 BPM
TRIGL SERPL-MCNC: 164 MG/DL (ref 0–150)
TROPONIN T SERPL-MCNC: 0.01 NG/ML (ref 0–0.03)
TROPONIN T SERPL-MCNC: 0.02 NG/ML (ref 0–0.03)
VLDLC SERPL-MCNC: 28 MG/DL (ref 5–40)
WBC NRBC COR # BLD: 5.7 10*3/MM3 (ref 3.4–10.8)

## 2023-01-04 PROCEDURE — 85025 COMPLETE CBC W/AUTO DIFF WBC: CPT | Performed by: EMERGENCY MEDICINE

## 2023-01-04 PROCEDURE — 93005 ELECTROCARDIOGRAM TRACING: CPT | Performed by: NURSE PRACTITIONER

## 2023-01-04 PROCEDURE — 78452 HT MUSCLE IMAGE SPECT MULT: CPT

## 2023-01-04 PROCEDURE — G0378 HOSPITAL OBSERVATION PER HR: HCPCS

## 2023-01-04 PROCEDURE — 96376 TX/PRO/DX INJ SAME DRUG ADON: CPT

## 2023-01-04 PROCEDURE — 25010000002 MORPHINE PER 10 MG: Performed by: NURSE PRACTITIONER

## 2023-01-04 PROCEDURE — 84484 ASSAY OF TROPONIN QUANT: CPT | Performed by: NURSE PRACTITIONER

## 2023-01-04 PROCEDURE — 93018 CV STRESS TEST I&R ONLY: CPT | Performed by: INTERNAL MEDICINE

## 2023-01-04 PROCEDURE — 93010 ELECTROCARDIOGRAM REPORT: CPT | Performed by: INTERNAL MEDICINE

## 2023-01-04 PROCEDURE — 0 TECHNETIUM TETROFOSMIN KIT: Performed by: EMERGENCY MEDICINE

## 2023-01-04 PROCEDURE — 78452 HT MUSCLE IMAGE SPECT MULT: CPT | Performed by: INTERNAL MEDICINE

## 2023-01-04 PROCEDURE — A9502 TC99M TETROFOSMIN: HCPCS | Performed by: EMERGENCY MEDICINE

## 2023-01-04 PROCEDURE — 99253 IP/OBS CNSLTJ NEW/EST LOW 45: CPT | Performed by: INTERNAL MEDICINE

## 2023-01-04 PROCEDURE — 80053 COMPREHEN METABOLIC PANEL: CPT | Performed by: EMERGENCY MEDICINE

## 2023-01-04 PROCEDURE — 25010000002 REGADENOSON 0.4 MG/5ML SOLUTION: Performed by: EMERGENCY MEDICINE

## 2023-01-04 PROCEDURE — 93017 CV STRESS TEST TRACING ONLY: CPT

## 2023-01-04 PROCEDURE — 80061 LIPID PANEL: CPT | Performed by: PHYSICIAN ASSISTANT

## 2023-01-04 PROCEDURE — 72141 MRI NECK SPINE W/O DYE: CPT

## 2023-01-04 RX ORDER — TIZANIDINE 4 MG/1
4 TABLET ORAL EVERY 8 HOURS PRN
Qty: 30 TABLET | Refills: 0 | Status: SHIPPED | OUTPATIENT
Start: 2023-01-04 | End: 2023-03-21

## 2023-01-04 RX ORDER — MELATONIN
1000 DAILY
Status: DISCONTINUED | OUTPATIENT
Start: 2023-01-04 | End: 2023-01-04 | Stop reason: HOSPADM

## 2023-01-04 RX ORDER — ASPIRIN 81 MG/1
81 TABLET, CHEWABLE ORAL DAILY
Status: DISCONTINUED | OUTPATIENT
Start: 2023-01-04 | End: 2023-01-04 | Stop reason: HOSPADM

## 2023-01-04 RX ORDER — TAMSULOSIN HYDROCHLORIDE 0.4 MG/1
0.4 CAPSULE ORAL NIGHTLY
Status: DISCONTINUED | OUTPATIENT
Start: 2023-01-04 | End: 2023-01-04 | Stop reason: HOSPADM

## 2023-01-04 RX ORDER — BUTALBITAL, ACETAMINOPHEN AND CAFFEINE 50; 325; 40 MG/1; MG/1; MG/1
1 TABLET ORAL EVERY 4 HOURS PRN
Status: DISCONTINUED | OUTPATIENT
Start: 2023-01-04 | End: 2023-01-04 | Stop reason: HOSPADM

## 2023-01-04 RX ORDER — ATORVASTATIN CALCIUM 40 MG/1
40 TABLET, FILM COATED ORAL NIGHTLY
Status: DISCONTINUED | OUTPATIENT
Start: 2023-01-04 | End: 2023-01-04 | Stop reason: HOSPADM

## 2023-01-04 RX ADMIN — TIZANIDINE 4 MG: 4 TABLET ORAL at 10:01

## 2023-01-04 RX ADMIN — BUTALBITAL, ACETAMINOPHEN, AND CAFFEINE 1 TABLET: 50; 325; 40 TABLET ORAL at 11:15

## 2023-01-04 RX ADMIN — Medication 1000 UNITS: at 10:01

## 2023-01-04 RX ADMIN — TETROFOSMIN 1 DOSE: 1.38 INJECTION, POWDER, LYOPHILIZED, FOR SOLUTION INTRAVENOUS at 06:44

## 2023-01-04 RX ADMIN — MORPHINE SULFATE 1 MG: 2 INJECTION, SOLUTION INTRAMUSCULAR; INTRAVENOUS at 05:06

## 2023-01-04 RX ADMIN — REGADENOSON 0.4 MG: 0.08 INJECTION, SOLUTION INTRAVENOUS at 08:48

## 2023-01-04 RX ADMIN — ASPIRIN 81 MG: 81 TABLET, CHEWABLE ORAL at 10:01

## 2023-01-04 RX ADMIN — TETROFOSMIN 1 DOSE: 1.38 INJECTION, POWDER, LYOPHILIZED, FOR SOLUTION INTRAVENOUS at 08:48

## 2023-01-04 RX ADMIN — METOPROLOL TARTRATE 25 MG: 25 TABLET, FILM COATED ORAL at 10:01

## 2023-01-04 NOTE — DISCHARGE SUMMARY
[facility]    Dwain Peter MD    CHIEF COMPLAINT:     Chest pain and left hand numbness    HISTORY OF PRESENT ILLNESS:    Obtained from ED provider HPI on 1/3/2023:  72-year-old male presents with a 6-day history of left hand \"numbness\" with midsternal chest \"tightness\".  He also reports left lateral neck \"spasms and charley horses\" that has been occurring for the last 3 months, but has worsened in the last 3 weeks.  He reports that it is waking him from sleep at night.  He denies recent injury but did hurt his neck when falling from a ladder 2 years prior.  He denies being a smoker reports he quit drinking alcohol approximately 7 years ago.  His history significant for 5 vessel CABG small intestine perforation and hypertension.    1/4/2023:  Patient confirms the HPI noted above including recent \"neck spasms\" with some neck pain as well as left hand numbness.  He notes that over the past several days he has had some intermittent episodes of chest discomfort described as a \"tightness\" in the left upper portion of his chest sometimes radiating towards his left shoulder.  No dyspnea, nausea or vomiting, peripheral edema or palpitations are reported.  No focal neurologic deficits, trauma, falls or changes in bowel or bladder habits/incontinence are noted        Past Medical History:   Diagnosis Date   • Abnormal nuclear stress test 6/22/2019   • Arthritis    • Chronic deep vein thrombosis (DVT) of left lower extremity (HCC)    • Chronic kidney disease (CKD), stage II (mild)    • Coronary artery disease    • DVT (deep venous thrombosis) (HCC)    • Hypertension    • BHATTI (nonalcoholic steatohepatitis) 06/2019   • Unstable angina (HCC) 6/22/2019     Past Surgical History:   Procedure Laterality Date   • CARDIAC CATHETERIZATION N/A 6/22/2019    Procedure: LEFT HEART CATH with possible PCI;  Surgeon: Enmanuel Wild MD;  Location: [facility] INVASIVE LOCATION;  Service:  Cardiovascular   • COLON SURGERY     • CORONARY ARTERY BYPASS GRAFT N/A 6/24/2019    Procedure: CABG;  Surgeon: Jose Angel López MD;  Location: [facility];  Service: Cardiothoracic   • LIVER BIOPSY  06/2019     Family History   Problem Relation Age of Onset   • Heart disease Father      Social History     Tobacco Use   • Smoking status: Never   • Smokeless tobacco: Never   Vaping Use   • Vaping Use: Never used   Substance Use Topics   • Alcohol use: No   • Drug use: No     Medications Prior to Admission   Medication Sig Dispense Refill Last Dose   • aspirin 81 MG chewable tablet Chew 1 tablet Daily.   1/3/2023 at 0900   • atorvastatin (LIPITOR) 40 MG tablet TAKE 1 TABLET BY MOUTH EVERY NIGHT 90 tablet 1 1/2/2023 at 2100   • cholecalciferol (VITAMIN D3) 25 MCG (1000 UT) tablet Take 1 tablet by mouth Daily.   1/3/2023 at 0900   • lisinopril (PRINIVIL,ZESTRIL) 10 MG tablet Take 1 tablet by mouth Daily. 90 tablet 3 1/3/2023 at 0900   • metoprolol tartrate (LOPRESSOR) 25 MG tablet TAKE 1 TABLET BY MOUTH EVERY 12 HOURS 180 tablet 1 1/3/2023 at 0900   • Omega-3 Fatty Acids (FISH OIL) 1000 MG capsule capsule Take  by mouth Daily With Breakfast.   1/3/2023 at 0900   • tamsulosin (FLOMAX) 0.4 MG capsule 24 hr capsule Take 1 capsule by mouth Every Night.   1/2/2023 at 2100   • triazolam (HALCION) 0.25 MG tablet Take 0.25 mg by mouth At Night As Needed for Sleep. Triazolam 0.25mg 1 tab - 2 tab at bedtime PRN   1/2/2023 at 2100     Allergies:  Patient has no known allergies.      There is no immunization history on file for this patient.        REVIEW OF SYSTEMS:    Review of Systems   Constitutional: Negative.   HENT: Negative.    Eyes: Negative.    Cardiovascular: Positive for chest pain.   Respiratory: Negative.    Skin: Negative.    Musculoskeletal: Positive for neck pain.   Gastrointestinal: Negative.    Genitourinary: Negative.    Neurological: Positive for numbness and paresthesias.   Psychiatric/Behavioral: Negative.         Vital Signs  Temp:  [97 °F (36.1 °C)-98 °F (36.7 °C)] 97 °F (36.1 °C)  Heart Rate:  [63-83] 70  Resp:  [15-16] 15  BP: (105-151)/(63-83) 105/69          Physical Exam:  Physical Exam  Vitals reviewed.   Constitutional:       General: He is not in acute distress.     Appearance: Normal appearance. He is normal weight. He is not ill-appearing, toxic-appearing or diaphoretic.   HENT:      Head: Normocephalic.      Right Ear: External ear normal.      Left Ear: External ear normal.      Nose: Nose normal.      Mouth/Throat:      Mouth: Mucous membranes are moist.   Eyes:      Extraocular Movements: Extraocular movements intact.   Cardiovascular:      Rate and Rhythm: Normal rate and regular rhythm.      Pulses: Normal pulses.      Heart sounds: Normal heart sounds.   Pulmonary:      Effort: Pulmonary effort is normal.      Breath sounds: Normal breath sounds.   Abdominal:      General: Bowel sounds are normal.      Palpations: Abdomen is soft.      Tenderness: There is no abdominal tenderness.   Musculoskeletal:         General: Normal range of motion.      Cervical back: Normal range of motion.      Right lower leg: No edema.      Left lower leg: No edema.   Skin:     General: Skin is warm and dry.      Capillary Refill: Capillary refill takes less than 2 seconds.   Neurological:      General: No focal deficit present.      Mental Status: He is alert and oriented to person, place, and time.   Psychiatric:         Mood and Affect: Mood normal.         Behavior: Behavior normal.         Thought Content: Thought content normal.         Judgment: Judgment normal.         Emotional Behavior:   Normal   Debilities:  None  Results Review:    I reviewed the patient's new clinical results.  Lab Results (most recent)     Procedure Component Value Units Date/Time    Comprehensive Metabolic Panel [[id number]]  (Abnormal) Collected: 01/04/23 0502    Specimen: Blood Updated: 01/04/23 0554     Glucose 101 mg/dL      BUN 25 mg/dL       Creatinine 1.53 mg/dL      Sodium 138 mmol/L      Potassium 4.4 mmol/L      Chloride 105 mmol/L      CO2 22.0 mmol/L      Calcium 9.4 mg/dL      Total Protein 6.6 g/dL      Albumin 4.0 g/dL      ALT (SGPT) 16 U/L      AST (SGOT) 16 U/L      Alkaline Phosphatase 87 U/L      Total Bilirubin 0.5 mg/dL      Globulin 2.6 gm/dL      A/G Ratio 1.5 g/dL      BUN/Creatinine Ratio 16.3     Anion Gap 11.0 mmol/L      eGFR 48.6 mL/min/1.73      Comment: National Kidney Foundation and American Society of Nephrology (ASN) Task Force recommended calculation based on the Chronic Kidney Disease Epidemiology Collaboration (CKD-EPI) equation refit without adjustment for race.       Narrative:      GFR Normal >60  Chronic Kidney Disease <60  Kidney Failure <15      Troponin [[id number]]  (Normal) Collected: 01/04/23 0502    Specimen: Blood Updated: 01/04/23 0554     Troponin T 0.011 ng/mL     Narrative:      Troponin T Reference Range:  <= 0.03 ng/mL-   Negative for AMI  >0.03 ng/mL-     Abnormal for myocardial necrosis.  Clinicians would have to utilize clinical acumen, EKG, Troponin and serial changes to determine if it is an Acute Myocardial Infarction or myocardial injury due to an underlying chronic condition.       Results may be falsely decreased if patient taking Biotin.      CBC & Differential [[id number]]  (Abnormal) Collected: 01/04/23 0502    Specimen: Blood Updated: 01/04/23 0527    Narrative:      The following orders were created for panel order CBC & Differential.  Procedure                               Abnormality         Status                     ---------                               -----------         ------                     CBC Auto Differential[[id number]]        Abnormal            Final result                 Please view results for these tests on the individual orders.    CBC Auto Differential [[id number]]  (Abnormal) Collected: 01/04/23 0502    Specimen: Blood Updated: 01/04/23 0527     WBC 5.70  10*3/mm3      RBC 4.37 10*6/mm3      Hemoglobin 13.8 g/dL      Hematocrit 41.9 %      MCV 96.1 fL      MCH 31.7 pg      MCHC 32.9 g/dL      RDW 13.6 %      RDW-SD 45.1 fl      MPV 8.9 fL      Platelets 131 10*3/mm3      Neutrophil % 58.7 %      Lymphocyte % 26.5 %      Monocyte % 12.5 %      Eosinophil % 1.9 %      Basophil % 0.4 %      Neutrophils, Absolute 3.40 10*3/mm3      Lymphocytes, Absolute 1.50 10*3/mm3      Monocytes, Absolute 0.70 10*3/mm3      Eosinophils, Absolute 0.10 10*3/mm3      Basophils, Absolute 0.00 10*3/mm3      nRBC 0.0 /100 WBC     Troponin [[id number]]  (Normal) Collected: 01/04/23 0034    Specimen: Blood from Arm, Right Updated: 01/04/23 0106     Troponin T 0.016 ng/mL     Narrative:      Troponin T Reference Range:  <= 0.03 ng/mL-   Negative for AMI  >0.03 ng/mL-     Abnormal for myocardial necrosis.  Clinicians would have to utilize clinical acumen, EKG, Troponin and serial changes to determine if it is an Acute Myocardial Infarction or myocardial injury due to an underlying chronic condition.       Results may be falsely decreased if patient taking Biotin.      [place] Draw [[id number]] Collected: 01/03/23 1549    Specimen: Blood from Arm, Left Updated: 01/03/23 1701    Narrative:      The following orders were created for panel order [place] Draw.  Procedure                               Abnormality         Status                     ---------                               -----------         ------                     Green Top (Gel)[[id number]]                                  Final result               Lavender Top[[id number]]                                                                Gold Top - SST[[id number]]                                   Final result               Light Blue Top[[id number]]                                   Final result                 Please view results for these tests on the individual orders.    Green Top (Gel) [[id number]] Collected: 01/03/23 1549     Specimen: Blood from Arm, Left Updated: 01/03/23 1701     Extra Tube Hold for add-ons.     Comment: Auto resulted.       Light Blue Top [[id number]] Collected: 01/03/23 1549    Specimen: Blood from Arm, Left Updated: 01/03/23 1701     Extra Tube Hold for add-ons.     Comment: Auto resulted       Gold Top - SST [[id number]] Collected: 01/03/23 1549    Specimen: Blood from Arm, Left Updated: 01/03/23 1701     Extra Tube Hold for add-ons.     Comment: Auto resulted.       Comprehensive Metabolic Panel [[id number]]  (Abnormal) Collected: 01/03/23 1549    Specimen: Blood from Arm, Left Updated: 01/03/23 1629     Glucose 121 mg/dL      BUN 28 mg/dL      Creatinine 1.64 mg/dL      Sodium 137 mmol/L      Potassium 4.3 mmol/L      Chloride 102 mmol/L      CO2 24.0 mmol/L      Calcium 9.7 mg/dL      Total Protein 7.0 g/dL      Albumin 4.2 g/dL      ALT (SGPT) 17 U/L      AST (SGOT) 17 U/L      Alkaline Phosphatase 90 U/L      Total Bilirubin 0.3 mg/dL      Globulin 2.8 gm/dL      A/G Ratio 1.5 g/dL      BUN/Creatinine Ratio 17.1     Anion Gap 11.0 mmol/L      eGFR 44.7 mL/min/1.73      Comment: National Kidney Foundation and American Society of Nephrology (ASN) Task Force recommended calculation based on the Chronic Kidney Disease Epidemiology Collaboration (CKD-EPI) equation refit without adjustment for race.       Narrative:      GFR Normal >60  Chronic Kidney Disease <60  Kidney Failure <15      CBC & Differential [[id number]]  (Abnormal) Collected: 01/03/23 1549    Specimen: Blood from Arm, Left Updated: 01/03/23 1603    Narrative:      The following orders were created for panel order CBC & Differential.  Procedure                               Abnormality         Status                     ---------                               -----------         ------                     CBC Auto Differential[[id number]]        Abnormal            Final result                 Please view results for these tests on the individual orders.     CBC Auto Differential [[id number]]  (Abnormal) Collected: 01/03/23 1549    Specimen: Blood from Arm, Left Updated: 01/03/23 1603     WBC 6.10 10*3/mm3      RBC 4.49 10*6/mm3      Hemoglobin 14.4 g/dL      Hematocrit 42.7 %      MCV 95.1 fL      MCH 32.1 pg      MCHC 33.7 g/dL      RDW 13.1 %      RDW-SD 45.5 fl      MPV 9.1 fL      Platelets 120 10*3/mm3      Neutrophil % 75.6 %      Lymphocyte % 15.5 %      Monocyte % 7.6 %      Eosinophil % 1.0 %      Basophil % 0.3 %      Neutrophils, Absolute 4.60 10*3/mm3      Lymphocytes, Absolute 0.90 10*3/mm3      Monocytes, Absolute 0.50 10*3/mm3      Eosinophils, Absolute 0.10 10*3/mm3      Basophils, Absolute 0.00 10*3/mm3      nRBC 0.0 /100 WBC           Imaging Results (Most Recent)     Procedure Component Value Units Date/Time    MRI Cervical Spine Without Contrast [[id number]] Resulted: 01/04/23 1254     Updated: 01/04/23 1307    XR Spine Cervical Complete 4 or 5 View [[id number]] Collected: 01/03/23 1630     Updated: 01/03/23 1634    Narrative:      XR SPINE CERVICAL COMPLETE 4 OR 5 VW    Date of Exam: 1/3/2023 3:55 PM EST    Indication: bilateral muscle spasm of neck x 3 months.    Comparison: None available.    Findings:  No acute fracture is identified. The C1-2 articulation appears congruent. The alignment is anatomic. The vertebral body heights appear normal. There are mild discogenic changes at all levels. There is mild scattered facet arthropathy and uncovertebral   hypertrophy. There is some bony neural foraminal stenosis on the left C4-5, and on the right at C4-5. The prevertebral soft tissues are unremarkable.      Impression:      Impression:  1.No acute osseous process identified.  2.Multilevel degenerative changes as described above.    Electronically Signed: Chris Osorio    1/3/2023 4:31 PM EST    Workstation ID: [id number]    XR Chest 1 View [[id number]] Collected: 01/03/23 1625     Updated: 01/03/23 1632    Narrative:      XR CHEST 1 VW    Date of  Exam: 1/3/2023 4:01 PM EST    Indication: Chest Pain Protocol  Chest Pain Protocol.    Comparison: September 19, 2022    Findings:  The lungs are clear. The heart and mediastinal contours appear normal. There is no pleural effusion. The pulmonary vasculature appears normal. The osseous structures appear intact. Median sternotomy wires appear intact.      Impression:      Impression:  No acute cardiopulmonary process.    Electronically Signed: Chris Osorio    1/3/2023 4:30 PM EST    Workstation ID: [id number]        reviewed    ECG/EMG Results (most recent)     Procedure Component Value Units Date/Time    ECG 12 Lead Chest Pain [[id number]] Collected: 01/03/23 1512     Updated: 01/03/23 1513     QT Interval 379 ms     Narrative:      HEART RATE= 72  bpm  RR Interval= 836  ms  MI Interval= 151  ms  P Horizontal Axis= -8  deg  P Front Axis= 57  deg  QRSD Interval= 97  ms  QT Interval= 379  ms  QRS Axis= -10  deg  T Wave Axis= 2  deg  - ABNORMAL ECG -  Sinus rhythm  Inferior infarct, old  When compared with ECG of 26-Jun-2019 4:14:32,  Significant axis, voltage or hypertrophy change  Electronically Signed By:   Date and Time of Study: 2023-01-03 15:12:16    ECG 12 Lead Chest Pain [[id number]] Collected: 01/03/23 1921     Updated: 01/03/23 1922     QT Interval 417 ms     Narrative:      HEART RATE= 58  bpm  RR Interval= 1036  ms  MI Interval= 161  ms  P Horizontal Axis= 3  deg  P Front Axis= 45  deg  QRSD Interval= 103  ms  QT Interval= 417  ms  QRS Axis= -15  deg  T Wave Axis= -1  deg  - ABNORMAL ECG -  Sinus bradycardia  Probable inferior infarct, age indeterminate  Electronically Signed By:   Date and Time of Study: 2023-01-03 19:21:20    ECG 12 Lead Chest Pain [[id number]] Collected: 01/04/23 0755     Updated: 01/04/23 0756     QT Interval 427 ms     Narrative:      HEART RATE= 58  bpm  RR Interval= 1036  ms  MI Interval= 159  ms  P Horizontal Axis= -8  deg  P Front Axis= 52  deg  QRSD Interval= 106  ms  QT Interval=  427  ms  QRS Axis= -19  deg  T Wave Axis= 0  deg  - OTHERWISE NORMAL ECG -  Sinus bradycardia  Low voltage, precordial leads  Electronically Signed By:   Date and Time of Study: 2023-01-04 07:55:01        reviewed    Results for orders placed during the hospital encounter of 01/11/22    Duplex Venous Lower Extremity - Left CAR    Interpretation Summary  · Normal left lower extremity venous duplex scan.      Results for orders placed during the hospital encounter of 06/20/19    Adult Transthoracic Echo Complete W/ Cont if Necessary Per Protocol    Interpretation Summary  · The left ventricular cavity is mildly dilated.  · Left ventricular wall thickness is consistent with mild-to-moderate concentric hypertrophy.  · Left ventricular systolic function is low normal.  · Left ventricular diastolic dysfunction (grade I a) consistent with impaired relaxation.  · Left atrial cavity size is mild-to-moderately dilated.    Mildly dilated left ventricle with mild to moderate concentric left ventricle hypertrophy with diastolic dysfunction with overall EF of 50%  Mild to moderate biatrial enlargement  No effusion  Trace tricuspid regurgitation without pulmonary hypertension      Microbiology Results (last 10 days)     ** No results found for the last 240 hours. **          Assessment & Plan     Chest tightness     Chest pain with left hand numbness  Lab Results   Component Value Date    TROPONINT 0.011 01/04/2023    TROPONINT 0.016 01/04/2023    TROPONINT 0.012 01/03/2023   -Chest Xray: No acute cardiopulmonary process  -X-ray of of C-spine showed no acute osseous abnormality with multilevel degenerative changes noted  -EKG reported with sinus bradycardia at 58 with probable prior infarct noted though this study is not available for review in EMR at the time of my exam  -In the ED pt given 324 mg aspirin  -Lipid panel showed total cholesterol of 130 with an LDL of 66 and an HDL of 36  -Stress Test reported with a moderate sized  severe intensity mostly fixed perfusion defect involving the basal inferolateral wall suggestive of prior MI with no significant reversible ischemia with an EF of 60%  -Cardiology consulted note no further plans for evaluation or intervention at this time with recommended follow-up outpatient  -Telemetry  -NPO  -Continue statin and beta-blocker    Neck pain with radiculopathy  -X-ray of cervical spine showed no acute abnormalities with multilevel degenerative changes noted  -MRI of C-spine performed with official results pending and radiology department noting results may take 24 hours  -Confirmed no alarm symptoms with patient and family at this time, discussed the potential delay in results with referral planned to neurosurgery outpatient    CKD  Lab Results   Component Value Date    CREATININE 1.53 (H) 01/04/2023    BUN 25 (H) 01/04/2023    BCR 16.3 01/04/2023   -Previous creatinine: 1.3-1.4  -Hold ACE inhibitor for now  -Avoid nephrotoxic medication IV dye unless urgently needed  -Monitor BMP and I's and O's while admitted      Hypertension  -Poorly controlled   BP Readings from Last 1 Encounters:   01/04/23 105/69   -ACE inhibitor held temporarily  - Continue metoprolol  - Monitor while admitted    Hyperlipidemia  -Statin    BPH  -Flomax        I discussed the patients findings and my recommendations with patient and nursing.     Discharge Diagnosis:      Chest tightness      Hospital Course  Patient is a 72 y.o. male presented with chest pain with left hand numbness and in HPI noted above.  Serial troponins were assessed and found to be 0.012, 0.016 and 0.011.  Chest x-ray showed no acute process and x-ray of C-spine was obtained which showed no acute abnormalities but with multilevel degenerative changes present.  EKG was reported as sinus bradycardia at 58 with probable prior infarct.  The study was not available for review at the time of my exam.  He was given 324 mg aspirin in the ED and continued on 81 mg  aspirin daily.  Lipid panel was assessed with full results noted above.  Stress testing was performed with indication of previous MI with a fixed defect and no significant reversible ischemia noted.  Cardiology was consulted in the ED who recommend no further evaluation or intervention at this time with outpatient follow-up planned.  MRI of C-spine was ordered with final results pending.  At this time patient felt to be in good condition for discharge with close follow-up with his PCP as well as cardiology and neurosurgery on an outpatient basis.  His full testing/results and plan were discussed with patient along with concerning/alarm symptoms for which to call 911/return to the ED. tizanidine will be prescribed at discharge.  All questions were answered he verbalized his understanding and agreement.    Past Medical History:     Past Medical History:   Diagnosis Date   • Abnormal nuclear stress test 6/22/2019   • Arthritis    • Chronic deep vein thrombosis (DVT) of left lower extremity (HCC)    • Chronic kidney disease (CKD), stage II (mild)    • Coronary artery disease    • DVT (deep venous thrombosis) (HCC)    • Hypertension    • BHATTI (nonalcoholic steatohepatitis) 06/2019   • Unstable angina (HCC) 6/22/2019       Past Surgical History:     Past Surgical History:   Procedure Laterality Date   • CARDIAC CATHETERIZATION N/A 6/22/2019    Procedure: LEFT HEART CATH with possible PCI;  Surgeon: Enmanuel Wild MD;  Location: [facility] INVASIVE LOCATION;  Service: Cardiovascular   • COLON SURGERY     • CORONARY ARTERY BYPASS GRAFT N/A 6/24/2019    Procedure: CABG;  Surgeon: Jose Angel López MD;  Location: [facility];  Service: Cardiothoracic   • LIVER BIOPSY  06/2019       Social History:   Social History     Socioeconomic History   • Marital status:    Tobacco Use   • Smoking status: Never   • Smokeless tobacco: Never   Vaping Use   • Vaping Use: Never used   Substance and Sexual Activity   •  Alcohol use: No   • Drug use: No   • Sexual activity: Defer       Procedures Performed         Consults:   Consults     Date and Time Order Name Status Description    1/3/2023  7:11 PM Inpatient Cardiology Consult Completed           Condition on Discharge:     Stable    Discharge Disposition  Home or Self Care    Discharge Medications     Discharge Medications      New Medications      Instructions Start Date   tiZANidine 4 MG tablet  Commonly known as: ZANAFLEX   4 mg, Oral, Every 8 Hours PRN         Continue These Medications      Instructions Start Date   aspirin 81 MG chewable tablet   1 tablet, Oral, Daily      atorvastatin 40 MG tablet  Commonly known as: LIPITOR   TAKE 1 TABLET BY MOUTH EVERY NIGHT      cholecalciferol 25 MCG (1000 UT) tablet  Commonly known as: VITAMIN D3   1 tablet, Oral, Daily      fish oil 1000 MG capsule capsule   Oral, Daily With Breakfast      metoprolol tartrate 25 MG tablet  Commonly known as: LOPRESSOR   TAKE 1 TABLET BY MOUTH EVERY 12 HOURS      tamsulosin 0.4 MG capsule 24 hr capsule  Commonly known as: FLOMAX   1 capsule, Oral, Nightly      triazolam 0.25 MG tablet  Commonly known as: HALCION   0.25 mg, Oral, Nightly PRN, Triazolam 0.25mg 1 tab - 2 tab at bedtime PRN          Stop These Medications    lisinopril 10 MG tablet  Commonly known as: PRINIVIL,ZESTRIL            Discharge Diet:     Activity at Discharge:     Follow-up Appointments  Future Appointments   Date Time Provider Department Center   2/15/2023  2:30 PM Enmanuel Wild MD [facility]     Additional Instructions for the Follow-ups that You Need to Schedule     Discharge Follow-up with PCP   As directed       Currently Documented PCP:    Dwain Peter MD    PCP Phone Number:    [phone]     Follow Up Details: 5 to 7 days         Discharge Follow-up with Specified Provider: Cardiology; 1 Month   As directed      To: Cardiology    Follow Up: 1 Month               Test Results Pending at  Discharge       Risk for Readmission (LACE) Score: 1 (1/4/2023  6:01 AM)          Mert Bernabe PA-C  01/04/23  15:57 EST

## 2023-01-04 NOTE — CONSULTS
[facility] CARDIOLOGY CONSULT  [facility]      Cardiology assessment and plan    Chest pain  Normal troponin  No acute coronary syndrome  Possible cervical radiculopathy  Coronary artery disease postcardiac bypass surgery in 2019  Normal LV systolic function  Chronic kidney disease  Bhatti  Hypertension  Hyperlipidemia  Prior history of DVT  Neck pain with left arm tingling and numbness    Prior work-up cath findings stress test results reviewed and discussed with patient  Likely symptoms are noncardiac  T-max is 97.8 pulse is 70 respirations are 16 blood pressure is 109/66 sats are 98%  Normal troponin  Sodium is 138 potassium is 4.4 creatinine is 1.5 LFTs are normal total cholesterol is 130 HDL is 36 LDL is 66 hemoglobin is 13.8  Okay to discharge from cardiac standpoint and follow-up in the office  Discussed with patient and family at bedside              Subjective:     Encounter Date:01/03/2023      Patient ID: Chris Tinsley is a 72 y.o. male.    Chief Complaint: chest pain      HPI:  Chris Tinsley is a 72 y.o. male known to Dr. Wild with a past cardiac history of CAD s/p 5v CABG in 2019.  2D echo 2019 showed EF 50%, grade 1 diastolic dysfunction, and trace MR.  PMH includes HTN, HLD, stage 3 CKD, BHATTI, and hx DVT (off anticoagulation).  He presents with c/o chest pain and left hand parasthesias.  Cardiology was consulted for further evaluation.      Patient reports that he woke up with midsternal chest tightness that was non-radiating.  He also reports constant left hand parasthesias for the last week.  He tells me that he had a right shoulder injury and been having neck spasms for 2 months.  He is typically active and able to perform yard work without difficulty.  He denies dyspnea.  He denies PND/orthpnea and notes chronic LLE edema.        Past Medical History:   Diagnosis Date   • Abnormal nuclear stress test 6/22/2019   • Arthritis    • Chronic deep vein  thrombosis (DVT) of left lower extremity (HCC)    • Chronic kidney disease (CKD), stage II (mild)    • Coronary artery disease    • DVT (deep venous thrombosis) (HCC)    • Hypertension    • BHATTI (nonalcoholic steatohepatitis) 06/2019   • Unstable angina (HCC) 6/22/2019         Past Surgical History:   Procedure Laterality Date   • CARDIAC CATHETERIZATION N/A 6/22/2019    Procedure: LEFT HEART CATH with possible PCI;  Surgeon: Enmanuel Wild MD;  Location: [facility] CATH INVASIVE LOCATION;  Service: Cardiovascular   • COLON SURGERY     • CORONARY ARTERY BYPASS GRAFT N/A 6/24/2019    Procedure: CABG;  Surgeon: Jose Angel López MD;  Location: [facility] CVOR;  Service: Cardiothoracic   • LIVER BIOPSY  06/2019         Social History     Socioeconomic History   • Marital status:    Tobacco Use   • Smoking status: Never   • Smokeless tobacco: Never   Vaping Use   • Vaping Use: Never used   Substance and Sexual Activity   • Alcohol use: No   • Drug use: No   • Sexual activity: Defer       Family History   Problem Relation Age of Onset   • Heart disease Father          No Known Allergies    Current Medications:   Scheduled Meds:aspirin, 324 mg, Oral, Once  aspirin, 81 mg, Oral, Daily  atorvastatin, 40 mg, Oral, Nightly  cholecalciferol, 1,000 Units, Oral, Daily  metoprolol tartrate, 25 mg, Oral, BID  sodium chloride, 10 mL, Intravenous, Q12H  tamsulosin, 0.4 mg, Oral, Nightly      Continuous Infusions:     Review of Systems   Constitutional: Negative for chills, decreased appetite and malaise/fatigue.   HENT: Negative for congestion and nosebleeds.    Eyes: Negative for blurred vision and double vision.   Cardiovascular: Positive for chest pain. Negative for dyspnea on exertion, leg swelling, near-syncope, orthopnea and palpitations.   Respiratory: Negative for cough and shortness of breath.    Hematologic/Lymphatic: Negative for adenopathy. Does not bruise/bleed easily.   Skin: Negative for rash.    Musculoskeletal: Positive for arthritis, joint pain, myalgias and neck pain.   Gastrointestinal: Negative for bloating, abdominal pain, hematemesis and hematochezia.   Neurological: Negative for dizziness and focal weakness.   Psychiatric/Behavioral: Negative for altered mental status and memory loss.     All other systems reviewed and are negative.       Objective:         /83 (BP Location: Right arm, Patient Position: Lying)   Pulse 64   Temp 97.8 °F (36.6 °C) (Oral)   Resp 16   Ht 172.7 cm (68\")   Wt 82.6 kg (182 lb)   SpO2 99%   BMI 27.67 kg/m²       General: Well-developed in NAD.  Neuro: AAOx3. No gross deficits.  HEENT: sclera clear, no xanthalasmas.  CV: S1S2 RRR. No murmurs or gallops. No JVD.  Resp: Breathing is unlabored. Lungs CTA throughout.  GI: BS+. Abdomen soft and NTTP.  Ext: Pedal pulses are palpable. Extremities are with trace non-pitting BLE edema, left > right.  MS: moves all extremities, neck weakness with active ROM.  Skin: warm, dry.  Psych: calm and cooperative.            Lab Review:     Results from last 7 days   Lab Units 01/04/23  0502 01/03/23  1549   SODIUM mmol/L 138 137   POTASSIUM mmol/L 4.4 4.3   CHLORIDE mmol/L 105 102   CO2 mmol/L 22.0 24.0   BUN mg/dL 25* 28*   CREATININE mg/dL 1.53* 1.64*   GLUCOSE mg/dL 101* 121*   CALCIUM mg/dL 9.4 9.7   AST (SGOT) U/L 16 17   ALT (SGPT) U/L 16 17     Results from last 7 days   Lab Units 01/04/23  0502 01/04/23  0034 01/03/23  1758 01/03/23  1549   TROPONIN T ng/mL 0.011 0.016 0.012 <0.010     Results from last 7 days   Lab Units 01/04/23  0502 01/03/23  1549   WBC 10*3/mm3 5.70 6.10   HEMOGLOBIN g/dL 13.8 14.4   HEMATOCRIT % 41.9 42.7   PLATELETS 10*3/mm3 131* 120*                   Invalid input(s): LDLCALC            Recent Radiology:  Imaging Results (Most Recent)     Procedure Component Value Units Date/Time    XR Spine Cervical Complete 4 or 5 View [[id number]] Collected: 01/03/23 1630     Updated: 01/03/23 1638     Narrative:      XR SPINE CERVICAL COMPLETE 4 OR 5 VW    Date of Exam: 1/3/2023 3:55 PM EST    Indication: bilateral muscle spasm of neck x 3 months.    Comparison: None available.    Findings:  No acute fracture is identified. The C1-2 articulation appears congruent. The alignment is anatomic. The vertebral body heights appear normal. There are mild discogenic changes at all levels. There is mild scattered facet arthropathy and uncovertebral   hypertrophy. There is some bony neural foraminal stenosis on the left C4-5, and on the right at C4-5. The prevertebral soft tissues are unremarkable.      Impression:      Impression:  1.No acute osseous process identified.  2.Multilevel degenerative changes as described above.    Electronically Signed: Chris Osorio    1/3/2023 4:31 PM EST    Workstation ID: [id number]    XR Chest 1 View [[id number]] Collected: 01/03/23 1625     Updated: 01/03/23 1632    Narrative:      XR CHEST 1 VW    Date of Exam: 1/3/2023 4:01 PM EST    Indication: Chest Pain Protocol  Chest Pain Protocol.    Comparison: September 19, 2022    Findings:  The lungs are clear. The heart and mediastinal contours appear normal. There is no pleural effusion. The pulmonary vasculature appears normal. The osseous structures appear intact. Median sternotomy wires appear intact.      Impression:      Impression:  No acute cardiopulmonary process.    Electronically Signed: Chris Osorio    1/3/2023 4:30 PM EST    Workstation ID: [id number]            ECHOCARDIOGRAM:    Results for orders placed during the hospital encounter of 06/20/19    Adult Transthoracic Echo Complete W/ Cont if Necessary Per Protocol    Interpretation Summary  · The left ventricular cavity is mildly dilated.  · Left ventricular wall thickness is consistent with mild-to-moderate concentric hypertrophy.  · Left ventricular systolic function is low normal.  · Left ventricular diastolic dysfunction (grade I a) consistent with impaired relaxation.  ·  Left atrial cavity size is mild-to-moderately dilated.    Mildly dilated left ventricle with mild to moderate concentric left ventricle hypertrophy with diastolic dysfunction with overall EF of 50%  Mild to moderate biatrial enlargement  No effusion  Trace tricuspid regurgitation without pulmonary hypertension            Assessment:         Active Hospital Problems    Diagnosis  POA   • **Chest tightness [R07.89]  Yes     1) Chest Pain  - troponin negative x3  - EKG shows no acute ST abnormalities  - CXR is negative for acute findings    2) CAD s/p 5v CABG in 2019.    - 2D echo 2019 showed EF 50%, grade 1 diastolic dysfunction, and trace MR.    - on aspirin, beta blocker    3) stage 3 CKD  - Cr 1.53    4) BHATTI    5) HTN    6) HLD    7) hx DVT   - off anticoagulation     8) neck pain  - per primary team         Plan:   Patient has been ruled out for ACS.  Nuclear stress test completed with images to follow.  Hopefully home later today if testing is normal.  Further recommendations per attending cardiologist.       Electronically signed by TORI Zepeda, 01/04/23, 9:18 AM EST.

## 2023-01-04 NOTE — PLAN OF CARE
Goal Outcome Evaluation:           Progress: improving  Outcome Evaluation: Myoview today. denies chest pain this morning assessment. will monitor.

## 2023-01-05 LAB
QT INTERVAL: 379 MS
QT INTERVAL: 417 MS

## 2023-01-13 NOTE — PROGRESS NOTES
"Neurosurgical Consultation      Chris Tinsley is a 72 y.o. male is being seen for consultation today at the request of Mert Bernabe PA-C for neck pain. In the office today patient reports    Chief Complaint   Patient presents with   • Neck Pain        Previous treatment:     HPI: This is a 72-year-old gentleman with a history of multivessel CABG performed in June 2019 as well as a history of prior small bowel resection who presents the neurosurgery clinic for evaluation of neck pain and left hand numbness and tingling.  He notes that he has chronic longstanding neck pain which was managed with conservative interventions.  He notes that approximately 3 to 4 months ago he had increase in the frequency and severity of his neck pain.  He notes that he awakens with neck pain almost every day and he describes the pain is approximately 3 out of 10.  It is worsened throughout the day with activity.  He has had 3-4 episodes of severe neck spasms which he defines as debilitating.  He awoke approximately 3 weeks ago with sudden onset left hand paresthesias and numbness.  After close discussion with him he does not describe a specific digit as being his worst digit.  He notes that the numbness and paresthesias are overall improving but have not completely resolved over the last 3 weeks.  He was prescribed tizanidine and does notice this improves the frequency of neck spasms.  He does utilize Biofreeze on his neck when he has significant pain.  He does not take nonsteroidal anti-inflammatories secondary to not being able to \"tolerate them\" in the past.    Past Medical History:   Diagnosis Date   • Abnormal nuclear stress test 6/22/2019   • Arthritis    • Chronic deep vein thrombosis (DVT) of left lower extremity (HCC)    • Chronic kidney disease (CKD), stage II (mild)    • Coronary artery disease    • DVT (deep venous thrombosis) (HCC)    • Hypertension    • BHATTI (nonalcoholic steatohepatitis) 06/2019   • Unstable " angina (HCC) 6/22/2019        Past Surgical History:   Procedure Laterality Date   • CARDIAC CATHETERIZATION N/A 6/22/2019    Procedure: LEFT HEART CATH with possible PCI;  Surgeon: Enmanuel Wild MD;  Location: [facility] CATH INVASIVE LOCATION;  Service: Cardiovascular   • COLON SURGERY     • CORONARY ARTERY BYPASS GRAFT N/A 6/24/2019    Procedure: CABG;  Surgeon: Jose Angel López MD;  Location: [facility] CVOR;  Service: Cardiothoracic   • LIVER BIOPSY  06/2019        Current Outpatient Medications on File Prior to Visit   Medication Sig Dispense Refill   • aspirin 81 MG chewable tablet Chew 1 tablet Daily.     • atorvastatin (LIPITOR) 40 MG tablet TAKE 1 TABLET BY MOUTH EVERY NIGHT 90 tablet 1   • cholecalciferol (VITAMIN D3) 25 MCG (1000 UT) tablet Take 1 tablet by mouth Daily.     • Hydrocortisone, Perianal, (ANUSOL-HC) 2.5 % rectal cream APPLY TOPICALLY TO THE AFFECTED AREA THREE TIMES DAILY AS NEEDED FOR HEMORRHOIDS     • metoprolol tartrate (LOPRESSOR) 25 MG tablet TAKE 1 TABLET BY MOUTH EVERY 12 HOURS 180 tablet 1   • Omega-3 Fatty Acids (FISH OIL) 1000 MG capsule capsule Take  by mouth Daily With Breakfast.     • tamsulosin (FLOMAX) 0.4 MG capsule 24 hr capsule Take 1 capsule by mouth Every Night.     • tiZANidine (ZANAFLEX) 4 MG tablet Take 1 tablet by mouth Every 8 (Eight) Hours As Needed for Muscle Spasms. 30 tablet 0   • triazolam (HALCION) 0.25 MG tablet Take 0.25 mg by mouth At Night As Needed for Sleep. Triazolam 0.25mg 1 tab - 2 tab at bedtime PRN       No current facility-administered medications on file prior to visit.        No Known Allergies     Social History     Socioeconomic History   • Marital status:    Tobacco Use   • Smoking status: Never   • Smokeless tobacco: Never   Vaping Use   • Vaping Use: Never used   Substance and Sexual Activity   • Alcohol use: No   • Drug use: No   • Sexual activity: Defer          Review of Systems     Physical Examination:     Vitals:    01/17/23  "1302   BP: 122/75   Pulse: 62   Temp: 98.4 °F (36.9 °C)   TempSrc: Temporal   SpO2: 97%   Weight: 87.5 kg (193 lb)   Height: 172.7 cm (68\")        Physical Exam  Eyes:      General: Lids are normal.      Extraocular Movements: Extraocular movements intact.      Pupils: Pupils are equal, round, and reactive to light.   Neurological:      Deep Tendon Reflexes:      Reflex Scores:       Tricep reflexes are 0 on the right side and 0 on the left side.       Bicep reflexes are 2+ on the right side and 2+ on the left side.       Brachioradialis reflexes are 2+ on the right side and 2+ on the left side.       Patellar reflexes are 2+ on the right side and 2+ on the left side.  Psychiatric:         Speech: Speech normal.          Neurological Exam  Mental Status  Awake, alert and oriented to person, place and time. Speech is normal. Language is fluent with no aphasia.    Cranial Nerves  CN II: Visual acuity is normal. Visual fields full to confrontation.  CN III, IV, VI: Extraocular movements intact bilaterally. Normal lids and orbits bilaterally. Pupils equal round and reactive to light bilaterally.  CN V: Facial sensation is normal.  CN VII: Full and symmetric facial movement.  CN IX, X: Palate elevates symmetrically. Normal gag reflex.  CN XI: Shoulder shrug strength is normal.  CN XII: Tongue midline without atrophy or fasciculations.    Motor  Normal muscle bulk throughout. No fasciculations present. Strength is 5/5 in all four extremities except as noted.  4+ out of 5 strength in the left hand and finger abduction.    Sensory  Left: Loss of sensation in the C7 dermatome. Loss of sensation in the ulnar nerve distribution.    Reflexes                                            Right                      Left  Brachioradialis                    2+                         2+  Biceps                                 2+                         2+  Triceps                                0                         0  Patellar    "                             2+                         2+    Right pathological reflexes: Jass's absent.  Left pathological reflexes: Jass's absent.       Result Review  The following data was reviewed by: Emmanuel Gordon MD on 01/17/2023:    Data reviewed: Radiologic studies MRI of the cervical spine shows a right-sided eccentric central disc herniation at C6/C7 resulting in some mild central canal stenosis.     Assessment/plan:  This is a 72-year-old gentleman with chronic longstanding neck pain worsened over the last 3 to 4 months.  He awoke approximately 3 weeks ago with significant left hand numbness and paresthesias which is slowly improving.  He does not have any significant history or physical exam concerning for myelopathy.  His MRI does show a right-sided eccentric C6-C7 disc herniation.  He has not completed significant conservative therapy.  I recommend a flexion-extension x-ray of the cervical spine to evaluate for any dynamic instability.  I also recommend a course of physical therapy with hopes that this lessens the neck pain and the frequency of neck spasms.  Max potation's and also improve his left hand paresthesias.  When he returns to see me we will reevaluate clinically.  He may benefit from cervical spinal canal injections if he has had persistent symptoms at that juncture.  I would consider utilizing nonsteroidal anti-inflammatories for arthritic type pain however he comments he is unable to tolerate this.  If his primary care provider feels as though they are able to find a medication in that class which is appropriate for him I think it is a reasonable option.  I have encouraged him to call with any questions or concerns.  He will follow-up with me in approximately 3 months.    Diagnoses and all orders for this visit:    1. Cervical radiculopathy at C7 (Primary)  -     Ambulatory Referral to Physical Therapy Evaluate and treat  -     XR spine cervical ap and lat w flex and ext;  Future         No follow-ups on file.            Emmanuel Gordon MD

## 2023-01-17 ENCOUNTER — OFFICE VISIT (OUTPATIENT)
Dept: NEUROSURGERY | Facility: CLINIC | Age: 73
End: 2023-01-17
Payer: COMMERCIAL

## 2023-01-17 ENCOUNTER — TELEPHONE (OUTPATIENT)
Dept: NEUROSURGERY | Facility: CLINIC | Age: 73
End: 2023-01-17

## 2023-01-17 VITALS
OXYGEN SATURATION: 97 % | TEMPERATURE: 98.4 F | DIASTOLIC BLOOD PRESSURE: 75 MMHG | HEART RATE: 62 BPM | WEIGHT: 193 LBS | HEIGHT: 68 IN | SYSTOLIC BLOOD PRESSURE: 122 MMHG | BODY MASS INDEX: 29.25 KG/M2

## 2023-01-17 DIAGNOSIS — M54.12 CERVICAL RADICULOPATHY AT C7: Primary | ICD-10-CM

## 2023-01-17 PROCEDURE — 99204 OFFICE O/P NEW MOD 45 MIN: CPT | Performed by: NEUROLOGICAL SURGERY

## 2023-01-17 RX ORDER — HYDROCORTISONE 25 MG/G
CREAM TOPICAL
COMMUNITY
Start: 2023-01-12

## 2023-01-17 NOTE — TELEPHONE ENCOUNTER
Caller: Chris Tinsley    Relationship: Self    Best call back number: [phone]    What specialty or service is being requested: PHY THERAPY    What is the provider, practice or medical service name: [facility]    What is the office location:[place]    What is the office phone number: [phone]    Any additional details: PLEASE CALL PT TO ADVISE.     THANK YOU,

## 2023-01-18 NOTE — TELEPHONE ENCOUNTER
PATIENT CALLED BACK AGAIN - LET HIM KNOW WE ARE STILL WAITING TO GET AN UPDATE.  PLEASE CALL PATIENT WHEN NEW REFERRAL HAS BEEN SENT TO [facility].    FAX: [phone]       THANK YOU!

## 2023-01-19 ENCOUNTER — HOSPITAL ENCOUNTER (OUTPATIENT)
Dept: GENERAL RADIOLOGY | Facility: HOSPITAL | Age: 73
Discharge: HOME | End: 2023-01-19
Admitting: NEUROLOGICAL SURGERY
Payer: COMMERCIAL

## 2023-01-19 DIAGNOSIS — M54.12 CERVICAL RADICULOPATHY AT C7: ICD-10-CM

## 2023-01-19 PROCEDURE — 72050 X-RAY EXAM NECK SPINE 4/5VWS: CPT

## 2023-01-25 RX ORDER — LISINOPRIL 10 MG/1
10 TABLET ORAL DAILY
Qty: 90 TABLET | Refills: 3 | OUTPATIENT
Start: 2023-01-25

## 2023-02-09 RX ORDER — TRAMADOL HYDROCHLORIDE 50 MG/1
TABLET ORAL
COMMUNITY
Start: 2023-01-17 | End: 2023-03-21

## 2023-02-09 RX ORDER — HYDROCORTISONE 25 MG/G
CREAM TOPICAL
COMMUNITY
Start: 2023-01-12 | End: 2023-02-15 | Stop reason: SDUPTHER

## 2023-02-15 ENCOUNTER — OFFICE VISIT (OUTPATIENT)
Dept: CARDIOLOGY | Facility: CLINIC | Age: 73
End: 2023-02-15
Payer: COMMERCIAL

## 2023-02-15 VITALS
SYSTOLIC BLOOD PRESSURE: 166 MMHG | RESPIRATION RATE: 18 BRPM | OXYGEN SATURATION: 97 % | DIASTOLIC BLOOD PRESSURE: 101 MMHG | BODY MASS INDEX: 28.79 KG/M2 | HEIGHT: 68 IN | HEART RATE: 71 BPM | WEIGHT: 190 LBS

## 2023-02-15 DIAGNOSIS — R94.31 ABNORMAL EKG: ICD-10-CM

## 2023-02-15 DIAGNOSIS — I25.110 CORONARY ARTERY DISEASE INVOLVING NATIVE CORONARY ARTERY OF NATIVE HEART WITH UNSTABLE ANGINA PECTORIS: ICD-10-CM

## 2023-02-15 DIAGNOSIS — Z95.1 S/P CABG (CORONARY ARTERY BYPASS GRAFT): Primary | ICD-10-CM

## 2023-02-15 DIAGNOSIS — I10 ESSENTIAL HYPERTENSION: ICD-10-CM

## 2023-02-15 DIAGNOSIS — E78.2 MIXED HYPERLIPIDEMIA: ICD-10-CM

## 2023-02-15 PROCEDURE — 99214 OFFICE O/P EST MOD 30 MIN: CPT | Performed by: INTERNAL MEDICINE

## 2023-02-15 RX ORDER — LISINOPRIL 10 MG/1
10 TABLET ORAL DAILY
Qty: 90 TABLET | Refills: 3 | COMMUNITY
Start: 2023-02-15 | End: 2023-03-03

## 2023-02-15 NOTE — PROGRESS NOTES
Cardiology Office Visit      Encounter Date:  02/15/2023    Patient ID:   Chris Tinsley is a 72 y.o. male.      Reason For Followup:  Coronary artery disease  Hypertension  Hyperlipidemia  Chronic renal insufficiency    Brief Clinical History:  Dear Dr. Peter, Dwain Bello MD    I had the pleasure of seeing Chris Tinsley today. As you are well aware, this is a 72 y.o. male who presented to [facility] with c/o dizziness, SOA, and diaphoresis.  He ruled out for acute coronary syndrome.  Cardiology was consulted and pt had stress test that revealed prior MI and miles-infarct ischemia.  Pt had subsequent cardiac cath revealing MV CAD, EF 50%.   On 6/24/2019, he underwent CABG x5.         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%  Hypertension  Hyperipidemia  Acute on chronic renal insufficiency creatinine is back to baseline/recent labs with a stable creatinine  Normal myocardial perfusion study in January 2023    Recommendations:  Restart lisinopril for blood pressure control  Recheck BMP to make sure renal function is stable 1 month from the starting the lisinopril  Continue aggressive risk factor modification  Medications reviewed with the patient  Continue current medical therapy  Recent labs reviewed and discussed with patient   Test results and labs discussed with the patient  Recent labs work-up and stress test results during the hospital visit reviewed and discussed with patient  Continue follow-up with nephrology for chronic renal insufficiency  Continue current medical therapy with fish oil  "supplements metoprolol 25 mg p.o. twice daily Lipitor 40 mg p.o. once a day aspirin 81 mg p.o. once a day lisinopril 10 mg p.o. once a day  Continued aggressive risk factor modification  Close monitoring of blood pressure at home  Follow-up in office in 6 months      Objective:    Vitals:  Vitals:    02/15/23 1426   BP: (!) 166/101   BP Location: Left arm   Patient Position: Sitting   Cuff Size: Large Adult   Pulse: 71   Resp: 18   SpO2: 97%   Weight: 86.2 kg (190 lb)   Height: 172.7 cm (68\")       Physical Exam:    General: Alert, cooperative, no distress, appears stated age  Head:  Normocephalic, atraumatic, mucous membranes moist  Eyes:  Conjunctiva/corneas clear, EOM's intact     Neck:  Supple,  no adenopathy;      Lungs: Clear to auscultation bilaterally, no wheezes rhonchi rales are noted  Chest wall: No tenderness  Heart::  Regular rate and rhythm, S1 and S2 normal, no murmur, rub or gallop  Abdomen: Soft, non-tender, nondistended bowel sounds active  Extremities: No cyanosis, clubbing, or edema  Pulses: 2+ and symmetric all extremities  Skin:  No rashes or lesions  Neuro/psych: A&O x3. CN II through XII are grossly intact with appropriate affect      Allergies:  No Known Allergies    Medication Review:     Current Outpatient Medications:   •  aspirin 81 MG chewable tablet, Chew 1 tablet Daily., Disp: , Rfl:   •  atorvastatin (LIPITOR) 40 MG tablet, TAKE 1 TABLET BY MOUTH EVERY NIGHT, Disp: 90 tablet, Rfl: 1  •  cholecalciferol (VITAMIN D3) 25 MCG (1000 UT) tablet, Take 1 tablet by mouth Daily., Disp: , Rfl:   •  Hydrocortisone, Perianal, (ANUSOL-HC) 2.5 % rectal cream, APPLY TOPICALLY TO THE AFFECTED AREA THREE TIMES DAILY AS NEEDED FOR HEMORRHOIDS, Disp: , Rfl:   •  metoprolol tartrate (LOPRESSOR) 25 MG tablet, TAKE 1 TABLET BY MOUTH EVERY 12 HOURS, Disp: 180 tablet, Rfl: 1  •  Omega-3 Fatty Acids (FISH OIL) 1000 MG capsule capsule, Take  by mouth Daily With Breakfast., Disp: , Rfl:   •  tamsulosin (FLOMAX) " 0.4 MG capsule 24 hr capsule, Take 1 capsule by mouth Every Night., Disp: , Rfl:   •  tiZANidine (ZANAFLEX) 4 MG tablet, Take 1 tablet by mouth Every 8 (Eight) Hours As Needed for Muscle Spasms., Disp: 30 tablet, Rfl: 0  •  traMADol (ULTRAM) 50 MG tablet, TAKE 1 TABLET BY MOUTH EVERY 6 HOURS FOR 7 DAYS AS NEEDED FOR PAIN, Disp: , Rfl:   •  triazolam (HALCION) 0.25 MG tablet, Take 0.25 mg by mouth At Night As Needed for Sleep. Triazolam 0.25mg 1 tab - 2 tab at bedtime PRN, Disp: , Rfl:     Family History:  Family History   Problem Relation Age of Onset   • Heart disease Father        Past Medical History:  Past Medical History:   Diagnosis Date   • Abnormal nuclear stress test 6/22/2019   • Arthritis    • Chronic deep vein thrombosis (DVT) of left lower extremity (HCC)    • Chronic kidney disease (CKD), stage II (mild)    • Coronary artery disease    • DVT (deep venous thrombosis) (HCC)    • Hypertension    • BHATTI (nonalcoholic steatohepatitis) 06/2019   • Unstable angina (HCC) 6/22/2019       Past surgical History:  Past Surgical History:   Procedure Laterality Date   • CARDIAC CATHETERIZATION N/A 6/22/2019    Procedure: LEFT HEART CATH with possible PCI;  Surgeon: Enmanuel Wild MD;  Location: [facility] CATH INVASIVE LOCATION;  Service: Cardiovascular   • COLON SURGERY     • CORONARY ARTERY BYPASS GRAFT N/A 6/24/2019    Procedure: CABG;  Surgeon: Jose Angel López MD;  [facility];  Service: Cardiothoracic   • LIVER BIOPSY  06/2019       Social History:  Social History     Socioeconomic History   • Marital status:    Tobacco Use   • Smoking status: Never   • Smokeless tobacco: Never   Vaping Use   • Vaping Use: Never used   Substance and Sexual Activity   • Alcohol use: No   • Drug use: No   • Sexual activity: Defer       Review of Systems:  The following systems were reviewed as they relate to the cardiovascular system: Constitutional, Eyes, ENT, Cardiovascular, Respiratory,  Gastrointestinal, Integumentary, Neurological, Psychiatric, Hematologic, Endocrine, Musculoskeletal, and Genitourinary. The pertinent cardiovascular findings are reported above with all other cardiovascular points within those systems being negative.    Diagnostic Study Review:     Current Electrocardiogram:  Procedures      NOTE: The following portions of the patient's history were reviewed and updated this visit as appropriate: allergies, current medications, past family history, past medical history, past social history, past surgical history and problem list.

## 2023-02-21 ENCOUNTER — TELEPHONE (OUTPATIENT)
Dept: NEUROSURGERY | Facility: CLINIC | Age: 73
End: 2023-02-21
Payer: COMMERCIAL

## 2023-02-21 ENCOUNTER — OFFICE VISIT (OUTPATIENT)
Dept: FAMILY MEDICINE CLINIC | Facility: CLINIC | Age: 73
End: 2023-02-21
Payer: COMMERCIAL

## 2023-02-21 VITALS
WEIGHT: 188.4 LBS | HEART RATE: 69 BPM | TEMPERATURE: 98.7 F | SYSTOLIC BLOOD PRESSURE: 171 MMHG | HEIGHT: 68 IN | BODY MASS INDEX: 28.55 KG/M2 | DIASTOLIC BLOOD PRESSURE: 90 MMHG | OXYGEN SATURATION: 97 %

## 2023-02-21 DIAGNOSIS — Z23 NEED FOR PNEUMOCOCCAL VACCINE: ICD-10-CM

## 2023-02-21 DIAGNOSIS — Z91.89 ENCOUNTER FOR HEPATITIS C VIRUS SCREENING TEST FOR HIGH RISK PATIENT: ICD-10-CM

## 2023-02-21 DIAGNOSIS — I82.5Y2 CHRONIC DEEP VEIN THROMBOSIS (DVT) OF PROXIMAL VEIN OF LEFT LOWER EXTREMITY: Chronic | ICD-10-CM

## 2023-02-21 DIAGNOSIS — F51.04 CHRONIC INSOMNIA: Chronic | ICD-10-CM

## 2023-02-21 DIAGNOSIS — Z12.11 SCREENING FOR COLON CANCER: ICD-10-CM

## 2023-02-21 DIAGNOSIS — R42 DIZZINESS: ICD-10-CM

## 2023-02-21 DIAGNOSIS — I10 BENIGN ESSENTIAL HTN: Chronic | ICD-10-CM

## 2023-02-21 DIAGNOSIS — Z95.1 S/P CABG (CORONARY ARTERY BYPASS GRAFT): ICD-10-CM

## 2023-02-21 DIAGNOSIS — E78.2 MIXED HYPERLIPIDEMIA: ICD-10-CM

## 2023-02-21 DIAGNOSIS — F41.1 GENERALIZED ANXIETY DISORDER: ICD-10-CM

## 2023-02-21 DIAGNOSIS — Z11.59 ENCOUNTER FOR HEPATITIS C VIRUS SCREENING TEST FOR HIGH RISK PATIENT: ICD-10-CM

## 2023-02-21 DIAGNOSIS — R06.02 SHORTNESS OF BREATH: ICD-10-CM

## 2023-02-21 DIAGNOSIS — K75.81 NASH (NONALCOHOLIC STEATOHEPATITIS): ICD-10-CM

## 2023-02-21 DIAGNOSIS — Z00.01 ENCOUNTER FOR ANNUAL GENERAL MEDICAL EXAMINATION WITH ABNORMAL FINDINGS IN ADULT: Primary | ICD-10-CM

## 2023-02-21 DIAGNOSIS — Z11.4 SCREENING FOR HIV (HUMAN IMMUNODEFICIENCY VIRUS): ICD-10-CM

## 2023-02-21 DIAGNOSIS — E55.9 VITAMIN D DEFICIENCY: ICD-10-CM

## 2023-02-21 PROBLEM — I21.9 MYOCARDIAL INFARCTION: Status: RESOLVED | Noted: 2023-02-21 | Resolved: 2023-02-21

## 2023-02-21 PROBLEM — R94.39 ABNORMAL NUCLEAR STRESS TEST: Status: RESOLVED | Noted: 2019-06-22 | Resolved: 2023-02-21

## 2023-02-21 PROBLEM — I21.9 MYOCARDIAL INFARCTION: Status: ACTIVE | Noted: 2023-02-21

## 2023-02-21 PROBLEM — B02.23 POST-HERPETIC POLYNEUROPATHY: Status: ACTIVE | Noted: 2023-02-21

## 2023-02-21 PROBLEM — R94.31 ABNORMAL EKG: Status: RESOLVED | Noted: 2019-06-22 | Resolved: 2023-02-21

## 2023-02-21 PROBLEM — N40.0 BENIGN PROSTATIC HYPERPLASIA: Status: ACTIVE | Noted: 2022-02-11

## 2023-02-21 PROBLEM — I20.0 UNSTABLE ANGINA: Status: RESOLVED | Noted: 2019-06-22 | Resolved: 2023-02-21

## 2023-02-21 PROBLEM — I82.409 DEEP VEIN THROMBOSIS (DVT): Status: ACTIVE | Noted: 2023-02-21

## 2023-02-21 PROBLEM — Z83.3 FAMILY HISTORY OF DIABETES MELLITUS: Status: ACTIVE | Noted: 2023-02-21

## 2023-02-21 PROBLEM — N18.31 STAGE 3A CHRONIC KIDNEY DISEASE (HCC): Status: ACTIVE | Noted: 2022-02-11

## 2023-02-21 PROCEDURE — 99202 OFFICE O/P NEW SF 15 MIN: CPT | Performed by: PREVENTIVE MEDICINE

## 2023-02-21 NOTE — TELEPHONE ENCOUNTER
Caller: ALEXUS    Relationship to patient: Provider    Best call back number: [phone]    Patient is needing:     ALEXUS FROM DR JOSÉ MIGUEL NOG[facility]IR[facility] OFFICE IS ASKING FOR LAST OVN BE FAXED [phone]    THANKS

## 2023-02-21 NOTE — PROGRESS NOTES
"The ABCs of the Annual Wellness Visit  Subsequent Medicare Wellness Visit    Subjective    History of Present Illness:  Chris Tinsley is a 72 y.o. male who presents for a Subsequent Medicare Wellness Visit.    The following portions of the patient's history were reviewed and   updated as appropriate: {history reviewed:20406::\"allergies\",\"current medications\",\"past family history\",\"past medical history\",\"past social history\",\"past surgical history\",\"problem list\"}.    Compared to one year ago, the patient feels his physical   health is {better worse same:07789}.    Compared to one year ago, the patient feels his mental   health is {better worse same:47215}.    Recent Hospitalizations:  This patient has had a [facility] admission record on file within the last 365 days.    Current Medical Providers:  Patient Care Team:  Dwain Peter MD as PCP - General (General Practice)    Outpatient Medications Prior to Visit   Medication Sig Dispense Refill   • aspirin 81 MG chewable tablet Chew 1 tablet Daily.     • atorvastatin (LIPITOR) 40 MG tablet TAKE 1 TABLET BY MOUTH EVERY NIGHT 90 tablet 1   • cholecalciferol (VITAMIN D3) 25 MCG (1000 UT) tablet Take 1 tablet by mouth Daily.     • Hydrocortisone, Perianal, (ANUSOL-HC) 2.5 % rectal cream APPLY TOPICALLY TO THE AFFECTED AREA THREE TIMES DAILY AS NEEDED FOR HEMORRHOIDS     • lisinopril (PRINIVIL,ZESTRIL) 10 MG tablet Take 1 tablet by mouth Daily. 90 tablet 3   • metoprolol tartrate (LOPRESSOR) 25 MG tablet TAKE 1 TABLET BY MOUTH EVERY 12 HOURS 180 tablet 1   • Omega-3 Fatty Acids (FISH OIL) 1000 MG capsule capsule Take  by mouth Daily With Breakfast.     • tamsulosin (FLOMAX) 0.4 MG capsule 24 hr capsule Take 1 capsule by mouth Every Night.     • tiZANidine (ZANAFLEX) 4 MG tablet Take 1 tablet by mouth Every 8 (Eight) Hours As Needed for Muscle Spasms. 30 tablet 0   • traMADol (ULTRAM) 50 MG tablet TAKE 1 TABLET BY MOUTH EVERY 6 HOURS FOR 7 DAYS AS NEEDED FOR " "PAIN     • triazolam (HALCION) 0.25 MG tablet Take 0.25 mg by mouth At Night As Needed for Sleep. Triazolam 0.25mg 1 tab - 2 tab at bedtime PRN       No facility-administered medications prior to visit.       Opioid medication/s are on active medication list.  and I have evaluated his active treatment plan and pain score trends (see table).  There were no vitals filed for this visit.  I have reviewed the chart for potential of high risk medication and harmful drug interactions in the elderly.            Aspirin is on active medication list. {ASPIRIN ON MEDICATION LIST INDICATED/NOT INDICATED:92062}.    Patient Active Problem List   Diagnosis   • Benign essential HTN   • Chronic pain   • Osteoarthritis   • Chronic deep vein thrombosis (DVT) of left lower extremity (HCC)   • Chronic insomnia   • Dizziness   • Shortness of breath   • Coronary artery disease involving native coronary artery with unstable angina pectoris (HCC)   • S/P CABG (LIMA to LAD, SVG to PDA, SVG to lateral branch of ramus, SVG to OM1 --medial branch of ramus) per Dr. López 6/24/2019   • Mixed hyperlipidemia   • Family history of diabetes mellitus   • Generalized anxiety disorder   • Post-herpetic polyneuropathy   • Proteinuria   • Stage 3a chronic kidney disease (HCC)   • BHATTI (nonalcoholic steatohepatitis)     Advance Care Planning  Advance Directive is not on file.  {ACP Discussion, Advance Directive not in EMR:64512}     Objective    There were no vitals filed for this visit.  Estimated body mass index is 28.89 kg/m² as calculated from the following:    Height as of 2/15/23: 172.7 cm (68\").    Weight as of 2/15/23: 86.2 kg (190 lb).    {BMI is >= 25 and <30. (Overweight) The following options were offered after discussion;:[id number]}      Does the patient have evidence of cognitive impairment? {Yes/No:92545}    Lab Results   Component Value Date    TRIG 164 (H) 01/04/2023    HDL 36 (L) 01/04/2023    LDL 66 01/04/2023    VLDL 28 01/04/2023      "   HEALTH RISK ASSESSMENT    Smoking Status:  Social History     Tobacco Use   Smoking Status Never   Smokeless Tobacco Never     Alcohol Consumption:  Social History     Substance and Sexual Activity   Alcohol Use No     Fall Risk Screen:    STEADI Fall Risk Assessment has not been completed.    Depression Screening:  PHQ-2/PHQ-9 Depression Screening 5/21/2021   Retired PHQ-9 Total Score 1   Retired Total Score 1       Health Habits and Functional and Cognitive Screening:  No flowsheet data found.    Age-appropriate Screening Schedule:  Refer to the list below for future screening recommendations based on patient's age, sex and/or medical conditions. Orders for these recommended tests are listed in the plan section. The patient has been provided with a written plan.    Health Maintenance   Topic Date Due   • TDAP/TD VACCINES (1 - Tdap) Never done   • ZOSTER VACCINE (3 of 3) 08/24/2018   • LIPID PANEL  01/04/2024   • INFLUENZA VACCINE  Completed                CMS Preventative Services Quick Reference  Risk Factors Identified During Encounter  {Medicare Wellness Risk Factors:79423}  The above risks/problems have been discussed with the patient.  Follow up actions/plans if indicated are seen below in the Assessment/Plan Section.  Pertinent information has been shared with the patient in the After Visit Summary.  An After Visit Summary and PPPS were made available to the patient.    Follow Up:   Next Medicare Wellness visit to be scheduled in 1 year.   {Wrapup  Review (Popup)  Advance Care Planning  Labs  CC  Problem List  Visit Diagnosis  Medications  Result Review  Imaging  Health Maintenance  Quality  BestPractice  SmartSets  SnapShot  Encounters  Notes  Media  Procedures :23}      Additional E&M Note during same encounter follows:  Patient has multiple medical problems which are significant and separately identifiable that require additional work above and beyond the Medicare Wellness Visit.         Chief Complaint  No chief complaint on file.    Subjective    {Problem List  Visit Diagnosis   Encounters  Notes  Medications  Labs  Result Review Imaging  Media :23}    HPI  Chris Tinsley also presents No chief complaint on file.  .         Objective   Vital Signs:  There were no vitals taken for this visit.    Physical Exam     {The following data was reviewed by (Optional):75975}  {Ambulatory Labs (Optional):06393}  {Data reviewed (Optional):30256:::1}           Assessment and Plan {CC Problem List  Visit Diagnosis   ROS  Review (Popup)  Health Maintenance  Quality  BestPractice  Medications  SmartSets  SnapShot Encounters  Media :23}  Diagnoses and all orders for this visit:    1. Encounter for annual general medical examination with abnormal findings in adult (Primary)           {Time Spent (Optional):58036}  Follow Up {Instructions Charge Capture  Follow-up Communications :23}  No follow-ups on file.  Patient was given instructions and counseling regarding his condition or for health maintenance advice. Please see specific information pulled into the AVS if appropriate.

## 2023-02-21 NOTE — PATIENT INSTRUCTIONS
Health Maintenance Due   Topic Date Due    COLORECTAL CANCER SCREENING  Never done    TDAP/TD VACCINES (1 - Tdap) Never done    Pneumococcal Vaccine 65+ (1 - PCV) Never done    ZOSTER VACCINE (3 of 3) 08/24/2018    HEPATITIS C SCREENING  Never done    ANNUAL WELLNESS VISIT  Never done    Patient to call [facility] for appointment

## 2023-02-21 NOTE — PROGRESS NOTES
"Subjective   Chris Tinsley is a 72 y.o. male presents for   Chief Complaint   Patient presents with   • Medicare Wellness-subsequent   • Establish Care     Patient was to have an evaluation to establish care but at the outset we advised that we do not prescribe the sleep medicine that he is used to taking so he will attempt to find another primary care provider that we will do that so that he does not have to go to the sleep doctor.  Health Maintenance Due   Topic Date Due   • COLORECTAL CANCER SCREENING  Never done   • TDAP/TD VACCINES (1 - Tdap) Never done   • Pneumococcal Vaccine 65+ (1 - PCV) Never done   • ZOSTER VACCINE (3 of 3) 08/24/2018   • HEPATITIS C SCREENING  Never done   • ANNUAL WELLNESS VISIT  Never done       History of Present Illness     Vitals:    02/21/23 1358 02/21/23 1407   BP: (!) 176/103 171/90   BP Location: Right arm Left arm   Patient Position: Sitting Sitting   Cuff Size: Adult Adult   Pulse: 69    Temp: 98.7 °F (37.1 °C)    TempSrc: Tympanic    SpO2: 97%    Weight: 85.5 kg (188 lb 6.4 oz)    Height: 172.7 cm (67.99\")      Body mass index is 28.65 kg/m².    Current Outpatient Medications on File Prior to Visit   Medication Sig Dispense Refill   • aspirin 81 MG chewable tablet Chew 1 tablet Daily.     • atorvastatin (LIPITOR) 40 MG tablet TAKE 1 TABLET BY MOUTH EVERY NIGHT 90 tablet 1   • cholecalciferol (VITAMIN D3) 25 MCG (1000 UT) tablet Take 1 tablet by mouth Daily.     • Hydrocortisone, Perianal, (ANUSOL-HC) 2.5 % rectal cream APPLY TOPICALLY TO THE AFFECTED AREA THREE TIMES DAILY AS NEEDED FOR HEMORRHOIDS     • metoprolol tartrate (LOPRESSOR) 25 MG tablet TAKE 1 TABLET BY MOUTH EVERY 12 HOURS 180 tablet 1   • Omega-3 Fatty Acids (FISH OIL) 1000 MG capsule capsule Take  by mouth Daily With Breakfast.     • tamsulosin (FLOMAX) 0.4 MG capsule 24 hr capsule Take 1 capsule by mouth Every Night.     • tiZANidine (ZANAFLEX) 4 MG tablet Take 1 tablet by mouth Every 8 (Eight) Hours As " Needed for Muscle Spasms. 30 tablet 0   • traMADol (ULTRAM) 50 MG tablet TAKE 1 TABLET BY MOUTH EVERY 6 HOURS FOR 7 DAYS AS NEEDED FOR PAIN     • triazolam (HALCION) 0.25 MG tablet Take 0.25 mg by mouth At Night As Needed for Sleep. Triazolam 0.25mg 1 tab - 2 tab at bedtime PRN       No current facility-administered medications on file prior to visit.       The following portions of the patient's history were reviewed and updated as appropriate: allergies, current medications, past family history, past medical history, past social history, past surgical history and problem list.    Review of Systems   Psychiatric/Behavioral: Positive for sleep disturbance.       Objective   Physical Exam  Vitals reviewed.   Constitutional:       General: He is not in acute distress.     Appearance: Normal appearance. He is well-developed. He is not ill-appearing or toxic-appearing.   HENT:      Head: Normocephalic and atraumatic.      Nose: Nose normal.   Eyes:      Extraocular Movements: Extraocular movements intact.      Conjunctiva/sclera: Conjunctivae normal.      Pupils: Pupils are equal, round, and reactive to light.   Cardiovascular:      Rate and Rhythm: Normal rate.   Pulmonary:      Effort: Pulmonary effort is normal.   Abdominal:      General: There is no distension.      Palpations: There is no mass.      Tenderness: There is no abdominal tenderness.   Neurological:      Mental Status: He is alert and oriented to person, place, and time.   Psychiatric:         Mood and Affect: Mood normal.         Behavior: Behavior normal.       PHQ-9 Total Score: 0    Assessment & Plan   Diagnoses and all orders for this visit:    1. Encounter for annual general medical examination with abnormal findings in adult (Primary)    2. Screening for colon cancer    3. Screening for HIV (human immunodeficiency virus)    4. Encounter for hepatitis C virus screening test for high risk patient    5. Benign essential HTN    6. S/P CABG (LIMA to  LAD, SVG to PDA, SVG to lateral branch of ramus, SVG to OM1 --medial branch of ramus) per Dr. López 6/24/2019    7. Mixed hyperlipidemia    8. Chronic deep vein thrombosis (DVT) of proximal vein of left lower extremity (HCC)    9. Chronic insomnia  Comments:  New patient who is used to receiving nighttime sleep medicines was advised that we do not prescribe those and he would have to see the sleep doctor.    10. Dizziness    11. Shortness of breath    12. Generalized anxiety disorder    13. BHATTI (nonalcoholic steatohepatitis)    14. Vitamin D deficiency    15. Need for pneumococcal vaccine        Patient Instructions     Health Maintenance Due   Topic Date Due   • COLORECTAL CANCER SCREENING  Never done   • TDAP/TD VACCINES (1 - Tdap) Never done   • Pneumococcal Vaccine 65+ (1 - PCV) Never done   • ZOSTER VACCINE (3 of 3) 08/24/2018   • HEPATITIS C SCREENING  Never done   • ANNUAL WELLNESS VISIT  Never done    Patient to call [facility] for appointment

## 2023-03-03 ENCOUNTER — TELEPHONE (OUTPATIENT)
Dept: CARDIOLOGY | Facility: CLINIC | Age: 73
End: 2023-03-03
Payer: COMMERCIAL

## 2023-03-03 NOTE — TELEPHONE ENCOUNTER
Called and notified and patient verbalized understanding    --------------------------    Enmanuel Wild MD [place], MA    Okay to stop lisinopril   Monitor blood pressure   If the blood pressure is high we can start a different medication in future           Previous Messages       ----- Message -----   From: BRYCE Bojorquez   Sent: 3/3/2023  10:02 AM EST   To: Enmanuel Wild MD   Subject: Lisinopril                                       Patient called states he is taking lisinopril 10mg daily. He states that is is extremely sick to his stomach and is dizzy with bad headaches. He wants to stop taking it.     Please advise

## 2023-03-06 ENCOUNTER — TELEPHONE (OUTPATIENT)
Dept: CARDIOLOGY | Facility: CLINIC | Age: 73
End: 2023-03-06
Payer: COMMERCIAL

## 2023-03-06 RX ORDER — AMLODIPINE BESYLATE 5 MG/1
5 TABLET ORAL DAILY
Qty: 30 TABLET | Refills: 2 | Status: SHIPPED | OUTPATIENT
Start: 2023-03-06 | End: 2023-03-21 | Stop reason: SDUPTHER

## 2023-03-06 NOTE — TELEPHONE ENCOUNTER
----- Message from Enmanuel Wild MD sent at 3/6/2023  2:20 PM EST -----  I spoke to the patient and started him on some Norvasc 5 mg p.o. once a day  Prescription is already called in  ----- Message -----  From: Sandy Marquez MA  Sent: 3/6/2023   2:12 PM EST  To: Enmanuel Wild MD    Pt called stating his bp is running high and is unable to shake a headache. He would like to know what you advise? He states his numbers are 147/95, 156/94

## 2023-03-20 ENCOUNTER — TELEPHONE (OUTPATIENT)
Dept: NEUROSURGERY | Facility: CLINIC | Age: 73
End: 2023-03-20
Payer: COMMERCIAL

## 2023-03-20 NOTE — PROGRESS NOTES
Neurosurgical Consultation      Chris Tinsley is a 72 y.o. male is here today for follow-up for neck pain. Today patient reports neck pain in the occipital area.    Chief Complaint   Patient presents with   • Neck Pain     Follow up        Previous treatment: Physical Therapy,    HPI: This is a 72-year-old gentleman who I last evaluated on January 17, 2023.  He has a history of a multivessel CABG performed in June 2019 as well as a history of a small bowel resection who presents in follow-up for neck pain and left hand numbness and tingling.  He has a history of chronic longstanding neck pain that was well managed with conservative therapy however approximately 4 to 6 months ago had an increase in the frequency and severity of the neck pain.  He struggles performing daily activities like mowing his yard.  His neck pain is his biggest complaint.  He also does describe numbness in his left hand.  The numbness in his left hand does not correlate with a specific dermatomal pattern or peripheral nerve pattern.  I referred him to physical therapy and he notes that he completed all but 2 sessions of the physical therapy.  He did not notice significant improvement.    Past Medical History:   Diagnosis Date   • Abnormal nuclear stress test 06/22/2019   • Chronic deep vein thrombosis (DVT) of left lower extremity (HCC)    • BHATTI (nonalcoholic steatohepatitis) 06/2019   • Unstable angina (HCC) 06/22/2019   • Vertigo         Past Surgical History:   Procedure Laterality Date   • CARDIAC CATHETERIZATION N/A 6/22/2019    Procedure: LEFT HEART CATH with possible PCI;  Surgeon: Enmanuel Wild MD;  Location: [facility] CATH INVASIVE LOCATION;  Service: Cardiovascular   • COLON SURGERY     • CORONARY ARTERY BYPASS GRAFT N/A 6/24/2019    Procedure: CABG;  Surgeon: Jose Angel López MD;  Location: [facility] CVOR;  Service: Cardiothoracic   • LIVER BIOPSY  06/2019        Current Outpatient Medications on File Prior to Visit    Medication Sig Dispense Refill   • amLODIPine (NORVASC) 5 MG tablet Take 1 tablet by mouth Daily. 30 tablet 2   • aspirin 81 MG chewable tablet Chew 1 tablet Daily.     • atorvastatin (LIPITOR) 40 MG tablet TAKE 1 TABLET BY MOUTH EVERY NIGHT 90 tablet 1   • cholecalciferol (VITAMIN D3) 25 MCG (1000 UT) tablet Take 1 tablet by mouth Daily.     • Hydrocortisone, Perianal, (ANUSOL-HC) 2.5 % rectal cream APPLY TOPICALLY TO THE AFFECTED AREA THREE TIMES DAILY AS NEEDED FOR HEMORRHOIDS     • metoprolol tartrate (LOPRESSOR) 25 MG tablet TAKE 1 TABLET BY MOUTH EVERY 12 HOURS 180 tablet 1   • Omega-3 Fatty Acids (FISH OIL) 1000 MG capsule capsule Take  by mouth Daily With Breakfast.     • tamsulosin (FLOMAX) 0.4 MG capsule 24 hr capsule Take 1 capsule by mouth Every Night.     • triazolam (HALCION) 0.25 MG tablet Take 1 tablet by mouth At Night As Needed for Sleep. Triazolam 0.25mg 1 tab - 2 tab at bedtime PRN     • [DISCONTINUED] tiZANidine (ZANAFLEX) 4 MG tablet Take 1 tablet by mouth Every 8 (Eight) Hours As Needed for Muscle Spasms. 30 tablet 0   • [DISCONTINUED] traMADol (ULTRAM) 50 MG tablet TAKE 1 TABLET BY MOUTH EVERY 6 HOURS FOR 7 DAYS AS NEEDED FOR PAIN       No current facility-administered medications on file prior to visit.        Allergies   Allergen Reactions   • Ibuprofen Nausea And Vomiting        Social History     Socioeconomic History   • Marital status:    Tobacco Use   • Smoking status: Never   • Smokeless tobacco: Never   Vaping Use   • Vaping Use: Never used   Substance and Sexual Activity   • Alcohol use: No   • Drug use: No   • Sexual activity: Defer          Review of Systems   Constitutional: Positive for activity change.   HENT: Negative.    Eyes: Negative.    Respiratory: Negative.    Cardiovascular: Negative.    Gastrointestinal: Negative.    Endocrine: Negative.    Genitourinary: Negative.    Musculoskeletal: Positive for arthralgias, myalgias, neck pain and neck stiffness.   Skin:  "Negative.    Allergic/Immunologic: Negative.    Neurological: Positive for dizziness (vertigo).   Hematological: Negative.    Psychiatric/Behavioral: Positive for sleep disturbance.        Physical Examination:     Vitals:    03/21/23 1052   BP: 177/96   Pulse: 103   SpO2: 97%   Weight: 86.5 kg (190 lb 9.6 oz)   Height: 172.7 cm (67.99\")   PainSc:   4        Physical Exam     Neurological Exam   Physical examination is stable compared to my last evaluation without any new red flag signs.    Result Review  The following data was reviewed by: Emmanuel Gordon MD on 03/21/2023:    Data reviewed: Radiologic studies Flexion-extension x-rays of the cervical spine do not show any dynamic instability.     Assessment/plan:  Is a 72-year-old gentleman with significant health history and chronic neck pain as well as left arm and hand paresthesias.  This does not directly correlate with cervical radicular pattern or peripheral nerve pattern.  His MRI does show a C6-C7 disc herniation however it is eccentric to the right.  His flexion-extension x-ray does not show any dynamic instability.  I recommend a new referral to physical therapy and also referral to pain management.  They can consider cervical epidural steroid injection.  I will have him follow-up with me in the future an as-needed basis.  I have encouraged him to call with any questions or concerns.    Diagnoses and all orders for this visit:    1. Neck pain, bilateral (Primary)  -     Ambulatory Referral to Physical Therapy Evaluate and treat    2. Cervical radiculopathy  -     Ambulatory Referral to Pain Management         Return if symptoms worsen or fail to improve.            Emmanuel Gordon MD  "

## 2023-03-20 NOTE — TELEPHONE ENCOUNTER
Called patient to ask where he completed his Physical Therapy and he stated that he completed approximately 8 visits at [facility], but would like to transfer to [facility] in [place] for future Physical Therapy.

## 2023-03-21 ENCOUNTER — TELEPHONE (OUTPATIENT)
Dept: CARDIOLOGY | Facility: CLINIC | Age: 73
End: 2023-03-21
Payer: COMMERCIAL

## 2023-03-21 ENCOUNTER — OFFICE VISIT (OUTPATIENT)
Dept: NEUROSURGERY | Facility: CLINIC | Age: 73
End: 2023-03-21
Payer: COMMERCIAL

## 2023-03-21 VITALS
HEART RATE: 103 BPM | HEIGHT: 68 IN | OXYGEN SATURATION: 97 % | WEIGHT: 190.6 LBS | SYSTOLIC BLOOD PRESSURE: 177 MMHG | DIASTOLIC BLOOD PRESSURE: 96 MMHG | BODY MASS INDEX: 28.89 KG/M2

## 2023-03-21 DIAGNOSIS — M54.12 CERVICAL RADICULOPATHY: ICD-10-CM

## 2023-03-21 DIAGNOSIS — M54.2 NECK PAIN, BILATERAL: Primary | ICD-10-CM

## 2023-03-21 PROCEDURE — 1160F RVW MEDS BY RX/DR IN RCRD: CPT | Performed by: NEUROLOGICAL SURGERY

## 2023-03-21 PROCEDURE — 3080F DIAST BP >= 90 MM HG: CPT | Performed by: NEUROLOGICAL SURGERY

## 2023-03-21 PROCEDURE — 99214 OFFICE O/P EST MOD 30 MIN: CPT | Performed by: NEUROLOGICAL SURGERY

## 2023-03-21 PROCEDURE — 1159F MED LIST DOCD IN RCRD: CPT | Performed by: NEUROLOGICAL SURGERY

## 2023-03-21 PROCEDURE — 3077F SYST BP >= 140 MM HG: CPT | Performed by: NEUROLOGICAL SURGERY

## 2023-03-21 RX ORDER — AMLODIPINE BESYLATE 5 MG/1
TABLET ORAL
Qty: 60 TABLET | Refills: 2 | Status: SHIPPED | OUTPATIENT
Start: 2023-03-21

## 2023-03-21 NOTE — TELEPHONE ENCOUNTER
Notified pt of the new instructions.  He voiced understanding.      Enmanuel Wild MD [place], Tracy G, MA  Increase Norvasc to 5 mg p.o. twice daily   Continue monitoring blood pressure           Previous Messages       ----- Message -----   From: Argelia Lowry MA   Sent: 3/20/2023   1:27 PM EDT   To: Enmanuel Wild MD     Pt stating since starting new BP meds his BP will start creeping up to about 146/94 around 4-5:00 in the after noon before its time to take his even meds so wanted to see if it needs to be adjusted.  Please advise.

## 2023-03-24 ENCOUNTER — OFFICE VISIT (OUTPATIENT)
Dept: PAIN MEDICINE | Facility: CLINIC | Age: 73
End: 2023-03-24
Payer: COMMERCIAL

## 2023-03-24 VITALS
SYSTOLIC BLOOD PRESSURE: 171 MMHG | DIASTOLIC BLOOD PRESSURE: 94 MMHG | RESPIRATION RATE: 16 BRPM | OXYGEN SATURATION: 97 % | HEART RATE: 94 BPM

## 2023-03-24 DIAGNOSIS — M50.20 DISPLACEMENT OF CERVICAL INTERVERTEBRAL DISC WITHOUT MYELOPATHY: Primary | ICD-10-CM

## 2023-03-24 PROCEDURE — G0463 HOSPITAL OUTPT CLINIC VISIT: HCPCS | Performed by: STUDENT IN AN ORGANIZED HEALTH CARE EDUCATION/TRAINING PROGRAM

## 2023-03-24 PROCEDURE — 99213 OFFICE O/P EST LOW 20 MIN: CPT | Performed by: STUDENT IN AN ORGANIZED HEALTH CARE EDUCATION/TRAINING PROGRAM

## 2023-03-24 RX ORDER — TIZANIDINE 4 MG/1
TABLET ORAL
COMMUNITY
Start: 2023-03-22

## 2023-03-24 NOTE — PROGRESS NOTES
CHIEF COMPLAINT  Chief Complaint   Patient presents with   • Neck Pain     F/u for neck pain and shoulder right side  currently doing PT has spasm currently doing muscle relaxer at hs would like to have Neck injection  CC  Closed fracture of neck of right femur  Currently taking Oxycodone left over from Dr. Hill  Last dose was on 03/22 took 1/2 tablet        Primary Care  Sandy Beard MD    Subjective   Chris Tinsley is a 72 y.o. male  who presents for right shoulder pain medication management.  He states that approximately 6 months ago, he sustained a fall off a ladder and subsequently fractured the head of the humerus.  He also sustained a rotator cuff injury resulting in biceps tendinopathy as well as adhesive capsulitis.  He is currently being treated conservatively with his orthopedic surgeon, but continues to require pain medication.  He is currently working with physical therapy as well as exercising several times a day at home.    History of Present Illness     Location: Right shoulder  Onset: 6 months ago  Duration: Improving  Timing: Constant throughout the day  Quality: Sharp, stabbing  Severity: Today: 3       Last Week: 3       Worst: 7  Modifying Factors: The pain is worse with exercising and working with physical therapy    Physical Therapy: yes    Interval Update 03/24/2023: He presents today as an extended follow-up.  He was initially evaluated approximately 2 years ago for right shoulder pain however today he presents for issues with numbness and tingling in the neck as well as the right shoulder and hand.  He reports that he is currently working with physical therapy however the physical activity is increasing the tingling and spasms.  He was evaluated by the neurosurgeon who recommended cervical epidural given that he has a C6-7 disc herniation with contact of the exiting nerve root.    The following portions of the patient's history were reviewed and updated as appropriate:  allergies, current medications, past family history, past medical history, past social history, past surgical history and problem list.      Current Outpatient Medications:   •  amLODIPine (NORVASC) 5 MG tablet, Take 1 tab BID, Disp: 60 tablet, Rfl: 2  •  aspirin 81 MG chewable tablet, Chew 1 tablet Daily., Disp: , Rfl:   •  atorvastatin (LIPITOR) 40 MG tablet, TAKE 1 TABLET BY MOUTH EVERY NIGHT, Disp: 90 tablet, Rfl: 1  •  cholecalciferol (VITAMIN D3) 25 MCG (1000 UT) tablet, Take 1 tablet by mouth Daily., Disp: , Rfl:   •  Hydrocortisone, Perianal, (ANUSOL-HC) 2.5 % rectal cream, APPLY TOPICALLY TO THE AFFECTED AREA THREE TIMES DAILY AS NEEDED FOR HEMORRHOIDS, Disp: , Rfl:   •  metoprolol tartrate (LOPRESSOR) 25 MG tablet, TAKE 1 TABLET BY MOUTH EVERY 12 HOURS, Disp: 180 tablet, Rfl: 1  •  Omega-3 Fatty Acids (FISH OIL) 1000 MG capsule capsule, Take  by mouth Daily With Breakfast., Disp: , Rfl:   •  tamsulosin (FLOMAX) 0.4 MG capsule 24 hr capsule, Take 1 capsule by mouth Every Night., Disp: , Rfl:   •  tiZANidine (ZANAFLEX) 4 MG tablet, , Disp: , Rfl:   •  triazolam (HALCION) 0.25 MG tablet, Take 1 tablet by mouth At Night As Needed for Sleep. Triazolam 0.25mg 1 tab - 2 tab at bedtime PRN, Disp: , Rfl:     Review of Systems   Constitutional: Negative for fatigue.   Gastrointestinal: Negative for constipation.   Musculoskeletal: Positive for arthralgias, myalgias and neck pain.        Right shoulder pain       Vitals:    03/24/23 1433   BP: 171/94   Pulse: 94   Resp: 16   SpO2: 97%   PainSc:   4       Urine Drug Screen: 5/21/2021  Appropriate: Pending    Objective   Physical Exam  Vitals and nursing note reviewed.   Constitutional:       General: He is not in acute distress.     Appearance: Normal appearance. He is normal weight.   Neck:      Comments: Cervical spine exam:  1.  Cervical paraspinals tender to palpation bilaterally  2.  Cervical facet loading negative bilaterally  3.  Spurling equivocal on both the  left and the right  4.  Significant tenderness in the trapezius bilaterally  Musculoskeletal:      Right shoulder: Tenderness and crepitus present. Decreased range of motion. Decreased strength.      Cervical back: Neck supple. Tenderness present.   Neurological:      Mental Status: He is alert.           Assessment & Plan   Problems Addressed this Visit    None  Visit Diagnoses     Displacement of cervical intervertebral disc without myelopathy    -  Primary    Relevant Orders    Epidural Block      Diagnoses       Codes Comments    Displacement of cervical intervertebral disc without myelopathy    -  Primary ICD-10-CM: M50.20  ICD-9-CM: 722.0           Plan:  1. He wishes to minimize narcotics as best possible  2. We will plan for cervical epidural at C6-7.  He is currently working with physical therapy but only gets limited benefit and continues with radicular pain  --- Follow-up next available for cervical epidural steroid injection           INSPECT REPORT    As part of the patient's treatment plan, I may be prescribing controlled substances. The patient has been made aware of appropriate use of such medications, including potential risk of somnolence, limited ability to drive and/or work safely, and the potential for dependence or overdose. It has also bee made clear that these medications are for use by this patient only, without concomitant use of alcohol or other substances unless prescribed.     Patient has completed prescribing agreement detailing terms of continued prescribing of controlled substances, including monitoring ESTEFANIA reports, urine drug screening, and pill counts if necessary. The patient is aware that inappropriate use will results in cessation of prescribing such medications.    INSPECT report has been reviewed and scanned into the patient's chart.    As the clinician, I personally reviewed the INSPECT from 3/22/2023.    History and physical exam exhibit continued safe and appropriate use  of controlled substances.      EMR Dragon/Transcription disclaimer:   Much of this encounter note is an electronic transcription/translation of spoken language to printed text. The electronic translation of spoken language may permit erroneous, or at times, nonsensical words or phrases to be inadvertently transcribed; Although I have reviewed the note for such errors, some may still exist.

## 2023-03-27 ENCOUNTER — TELEPHONE (OUTPATIENT)
Dept: PAIN MEDICINE | Facility: CLINIC | Age: 73
End: 2023-03-27

## 2023-03-27 RX ORDER — ATORVASTATIN CALCIUM 40 MG/1
TABLET, FILM COATED ORAL
Qty: 90 TABLET | Refills: 1 | Status: SHIPPED | OUTPATIENT
Start: 2023-03-27

## 2023-03-27 NOTE — TELEPHONE ENCOUNTER
Caller: PATIENT    Relationship to patient: SELF     Best call back number: [phone]    Patient is needing: PATIENT HAD A FEW QUESTIONS IN REGARDS TO HIS UPCOMING PROCEDURE WITH DR. VALE ON 04.06.23. PATIENT WANTED TO KNOW WHY THE PROCEDURE WAS NOT DONE DURING HIS LAST APPT. AS WELL AS, HOW LONG THE PROCEDURE WILL TAKE BECAUSE HE HAS ANOTHER APPT THIS DAY. I ATTEMPTED TO WARM TRANSFER CALL TO THE OFFICE BUT WAS PLACED ON AN EXTENDED HOLD. THANK YOU!

## 2023-04-06 ENCOUNTER — HOSPITAL ENCOUNTER (OUTPATIENT)
Dept: GENERAL RADIOLOGY | Facility: HOSPITAL | Age: 73
Discharge: HOME | End: 2023-04-06
Payer: COMMERCIAL

## 2023-04-06 ENCOUNTER — HOSPITAL ENCOUNTER (OUTPATIENT)
Dept: PAIN MEDICINE | Facility: HOSPITAL | Age: 73
Discharge: HOME | End: 2023-04-06
Payer: COMMERCIAL

## 2023-04-06 VITALS
TEMPERATURE: 97.2 F | OXYGEN SATURATION: 98 % | SYSTOLIC BLOOD PRESSURE: 131 MMHG | RESPIRATION RATE: 16 BRPM | DIASTOLIC BLOOD PRESSURE: 82 MMHG | HEART RATE: 62 BPM

## 2023-04-06 DIAGNOSIS — R52 PAIN: ICD-10-CM

## 2023-04-06 DIAGNOSIS — M50.20 DISPLACEMENT OF CERVICAL INTERVERTEBRAL DISC WITHOUT MYELOPATHY: ICD-10-CM

## 2023-04-06 PROCEDURE — 77003 FLUOROGUIDE FOR SPINE INJECT: CPT

## 2023-04-06 PROCEDURE — 25510000001 IOPAMIDOL 41 % SOLUTION: Performed by: STUDENT IN AN ORGANIZED HEALTH CARE EDUCATION/TRAINING PROGRAM

## 2023-04-06 PROCEDURE — 25010000002 METHYLPREDNISOLONE PER 40 MG: Performed by: STUDENT IN AN ORGANIZED HEALTH CARE EDUCATION/TRAINING PROGRAM

## 2023-04-06 PROCEDURE — 62321 NJX INTERLAMINAR CRV/THRC: CPT | Performed by: STUDENT IN AN ORGANIZED HEALTH CARE EDUCATION/TRAINING PROGRAM

## 2023-04-06 RX ORDER — METHYLPREDNISOLONE ACETATE 40 MG/ML
40 INJECTION, SUSPENSION INTRA-ARTICULAR; INTRALESIONAL; INTRAMUSCULAR; SOFT TISSUE ONCE
Status: COMPLETED | OUTPATIENT
Start: 2023-04-06 | End: 2023-04-06

## 2023-04-06 RX ORDER — BUPIVACAINE HYDROCHLORIDE 2.5 MG/ML
10 INJECTION, SOLUTION EPIDURAL; INFILTRATION; INTRACAUDAL ONCE
Status: COMPLETED | OUTPATIENT
Start: 2023-04-06 | End: 2023-04-06

## 2023-04-06 RX ADMIN — METHYLPREDNISOLONE ACETATE 40 MG: 40 INJECTION, SUSPENSION INTRA-ARTICULAR; INTRALESIONAL; INTRAMUSCULAR; SOFT TISSUE at 08:54

## 2023-04-06 RX ADMIN — BUPIVACAINE HYDROCHLORIDE 10 ML: 2.5 INJECTION, SOLUTION EPIDURAL; INFILTRATION; INTRACAUDAL; PERINEURAL at 08:54

## 2023-04-06 RX ADMIN — IOPAMIDOL 3 ML: 408 INJECTION, SOLUTION INTRATHECAL at 08:54

## 2023-04-06 NOTE — PROCEDURES
Cervical Epidural Steroid Injection @ C7-T1  [facility]    PREOPERATIVE DIAGNOSIS:  Cervical Radiculopathy, Cervical Spinal Stenosis and Cervical Disc Displacement  POSTOPERATIVE DIAGNOSIS:  Same as preop diagnosis    PROCEDURE:   Cervical Epidural Steroid Injection, Therapeutic Translaminar Injection, with epidurogram, at  C7-T1 level    PRE-PROCEDURE DISCUSSION WITH PATIENT:    Risks and complications were discussed with the patient prior to starting the procedure and informed consent was obtained.  We discussed various topics including but not limited to bleeding, infection, injury, paralysis, nerve injury, dural puncture, coma, death, worsening of clinical picture, lack of pain relief, and postprocedural soreness.    SURGEON:  Blue Gill MD    REASON FOR PROCEDURE:   Degenerative changes are noted in the area., Stenotic area is noted, and is likely contributing to this chronic &/or recurrent pain.  and Radiating pattern of pain is likely consistent with degenerative changes in the area.    SEDATION:  Patient declined administration of moderate sedation    ANESTHETIC:  injectable LAs: Marcaine 0.25%  STEROID:   Methylprednisolone (DEPO MEDROL) 40mg/ml    DESCRIPTON OF PROCEDURE:  After obtaining informed consent, I.V. was started in the preop area.   The patient was taken to the operating room and placed in the prone position.  All pressure points were well padded.  The cervicothoracic spine area was prepped with Chloraprep and draped in a sterile fashion.  Under fluoroscopic guidance, the aforementioned interlaminar space was identified. Skin and subcutaneous tissues were anesthetized with 1% lidocaine in the middle of the space. A Tuohy needle was introduced through the skin and advanced to this interlaminar space and into the epidural space under fluoroscopic guidance and verified with loss-of-resistance technique to air.  After confirming the position of the needle with the fluoroscope with all  the views, and after aspiration was confirmed negative for blood and CSF, 1.5 mL of Omnipaque was injected.  After seeing appropriate epidurogram with lateral and PA views, a total of 4 cc solution was injected, consisting of 3cc of local anesthetic as above, with normal saline and injectable steroid as above.     ESTIMATED BLOOD LOSS:  <5 mL  SPECIMENS:  None    COMPLICATIONS:     No complications were noted., There was no indication of vascular uptake on live injection of contrast dye., There was no indication of intrathecal uptake on live injection of contrast dye. and There was not any evidence of dural puncture.      TOLERANCE & DISCHARGE CONDITION:    The patient tolerated the procedure well.  The patient was transported to the recovery area without difficulties.  The patient was discharged to home under the care of family in stable and satisfactory condition.    PLAN OF CARE:  1. The patient was given our standard instruction sheet.        2.   The patient will Return to clinic 4-6 wks.  3.    The patient will resume all medications as per the medication reconciliation sheet.

## 2023-04-12 RX ORDER — AMLODIPINE BESYLATE 5 MG/1
TABLET ORAL
Qty: 60 TABLET | Refills: 2 | Status: SHIPPED | OUTPATIENT
Start: 2023-04-12

## 2023-04-12 RX ORDER — AMLODIPINE BESYLATE 5 MG/1
TABLET ORAL
Qty: 60 TABLET | Refills: 2 | Status: SHIPPED | OUTPATIENT
Start: 2023-04-12 | End: 2023-04-12 | Stop reason: SDUPTHER

## 2023-04-13 ENCOUNTER — LAB (OUTPATIENT)
Dept: LAB | Facility: HOSPITAL | Age: 73
End: 2023-04-13
Payer: COMMERCIAL

## 2023-04-13 DIAGNOSIS — Z95.1 S/P CABG (CORONARY ARTERY BYPASS GRAFT): ICD-10-CM

## 2023-04-13 DIAGNOSIS — I25.110 CORONARY ARTERY DISEASE INVOLVING NATIVE CORONARY ARTERY OF NATIVE HEART WITH UNSTABLE ANGINA PECTORIS: ICD-10-CM

## 2023-04-13 DIAGNOSIS — E78.2 MIXED HYPERLIPIDEMIA: ICD-10-CM

## 2023-04-13 DIAGNOSIS — I10 ESSENTIAL HYPERTENSION: ICD-10-CM

## 2023-04-13 LAB
ANION GAP SERPL CALCULATED.3IONS-SCNC: 10.2 MMOL/L (ref 5–15)
BUN SERPL-MCNC: 25 MG/DL (ref 8–23)
BUN/CREAT SERPL: 17.6 (ref 7–25)
CALCIUM SPEC-SCNC: 9.6 MG/DL (ref 8.6–10.5)
CHLORIDE SERPL-SCNC: 102 MMOL/L (ref 98–107)
CO2 SERPL-SCNC: 23.8 MMOL/L (ref 22–29)
CREAT SERPL-MCNC: 1.42 MG/DL (ref 0.76–1.27)
EGFRCR SERPLBLD CKD-EPI 2021: 52.8 ML/MIN/1.73
GLUCOSE SERPL-MCNC: 105 MG/DL (ref 65–99)
POTASSIUM SERPL-SCNC: 4.2 MMOL/L (ref 3.5–5.2)
SODIUM SERPL-SCNC: 136 MMOL/L (ref 136–145)

## 2023-04-13 PROCEDURE — 36415 COLL VENOUS BLD VENIPUNCTURE: CPT

## 2023-04-13 PROCEDURE — 80048 BASIC METABOLIC PNL TOTAL CA: CPT

## 2023-04-28 ENCOUNTER — TELEPHONE (OUTPATIENT)
Dept: CARDIOLOGY | Facility: CLINIC | Age: 73
End: 2023-04-28
Payer: COMMERCIAL

## 2023-04-28 ENCOUNTER — TELEPHONE (OUTPATIENT)
Dept: PAIN MEDICINE | Facility: CLINIC | Age: 73
End: 2023-04-28
Payer: COMMERCIAL

## 2023-04-28 RX ORDER — LISINOPRIL 10 MG/1
10 TABLET ORAL DAILY
Qty: 30 TABLET | Refills: 3 | Status: SHIPPED | OUTPATIENT
Start: 2023-04-28

## 2023-04-28 NOTE — TELEPHONE ENCOUNTER
Called and notified, patient verbalized understanding    ----------------------    Enmanuel Wild MD  , BRYCE Bojorquez    Likely to taking amlodipine 5 mg twice a day is causing some of the swelling   Decrease the dose of Norvasc to 5 mg p.o. once a day   Start back on lisinopril 10 mg p.o. once a day   Depending upon the blood pressure readings and swelling we can decide what to do with the medications in future           Previous Messages       ----- Message -----   From:  BRYCE Bojorquez   Sent: 4/27/2023   3:52 PM EDT   To: Enmanuel Wild MD   Subject: Amlodipine                                       Patient called and states he has been having cramping in the feet and fingers since he started taking the amlodipine. He states its too much to deal with.   He wants to know if there is another medication he can go on. He states he is willing to go back on the Lisinopril.     Please advise

## 2023-04-28 NOTE — TELEPHONE ENCOUNTER
Caller: Chris Tinsley    Relationship to patient: Self    Best call back number: [phone]    Type of visit: EPIDURAL    Requested date: ASAP

## 2023-05-02 NOTE — TELEPHONE ENCOUNTER
Caller: Chris Tinsley A    Relationship to patient: Self    Best call back number: [phone]    Patient is needing: PATIENT STATED HE IS IN A LOT OF PAIN AND FOLLOWING UP ON HIS MESSAGE FROM YESTERDAY SINCE DR. RUST'S NEVER CALLED HIM BACK. I ATTEMPTED TO WARM TRANSFER CALL TO THE OFFICE BUT RECEIVED NO ANSWER. PATIENT WOULD LIKE A CALL BACK ASAP. THANK YOU!

## 2023-05-04 ENCOUNTER — OFFICE VISIT (OUTPATIENT)
Dept: PAIN MEDICINE | Facility: CLINIC | Age: 73
End: 2023-05-04
Payer: COMMERCIAL

## 2023-05-04 VITALS
DIASTOLIC BLOOD PRESSURE: 65 MMHG | OXYGEN SATURATION: 96 % | RESPIRATION RATE: 16 BRPM | SYSTOLIC BLOOD PRESSURE: 117 MMHG | HEART RATE: 76 BPM

## 2023-05-04 DIAGNOSIS — M50.20 DISPLACEMENT OF CERVICAL INTERVERTEBRAL DISC WITHOUT MYELOPATHY: ICD-10-CM

## 2023-05-04 DIAGNOSIS — Z79.899 HIGH RISK MEDICATION USE: Primary | ICD-10-CM

## 2023-05-04 PROCEDURE — G0463 HOSPITAL OUTPT CLINIC VISIT: HCPCS | Performed by: STUDENT IN AN ORGANIZED HEALTH CARE EDUCATION/TRAINING PROGRAM

## 2023-05-04 RX ORDER — METHOCARBAMOL 500 MG/1
500 TABLET, FILM COATED ORAL 2 TIMES DAILY PRN
Qty: 60 TABLET | Refills: 0 | Status: SHIPPED | OUTPATIENT
Start: 2023-05-04

## 2023-05-04 RX ORDER — HYDROCODONE BITARTRATE AND ACETAMINOPHEN 10; 325 MG/1; MG/1
1 TABLET ORAL 2 TIMES DAILY PRN
Qty: 60 TABLET | Refills: 0 | Status: SHIPPED | OUTPATIENT
Start: 2023-05-04

## 2023-05-04 NOTE — PROGRESS NOTES
CHIEF COMPLAINT  Chief Complaint   Patient presents with   • Neck Pain     F/u from Cervical epidural  CC  Displacement of cervical intervertebral disc  No Narcotics        Primary Care  Dwain Peter MD    Subjective   Chris Tinsley is a 73 y.o. male  who presents for right shoulder pain medication management.  He states that approximately 6 months ago, he sustained a fall off a ladder and subsequently fractured the head of the humerus.  He also sustained a rotator cuff injury resulting in biceps tendinopathy as well as adhesive capsulitis.  He is currently being treated conservatively with his orthopedic surgeon, but continues to require pain medication.  He is currently working with physical therapy as well as exercising several times a day at home.    Neck Pain          Location: Right shoulder  Onset: 6 months ago  Duration: Improving  Timing: Constant throughout the day  Quality: Sharp, stabbing  Severity: Today: 3       Last Week: 3       Worst: 7  Modifying Factors: The pain is worse with exercising and working with physical therapy    Physical Therapy: yes    Interval Update 05/04/2023: He reports 0% benefit with his cervical epidural steroid injection    The following portions of the patient's history were reviewed and updated as appropriate: allergies, current medications, past family history, past medical history, past social history, past surgical history and problem list.    Procedures:  1. 4/6/2023: Cervical epidural: 0% benefit      Current Outpatient Medications:   •  amLODIPine (NORVASC) 5 MG tablet, Take 1 tab BID, Disp: 60 tablet, Rfl: 2  •  aspirin 81 MG chewable tablet, Chew 1 tablet Daily., Disp: , Rfl:   •  atorvastatin (LIPITOR) 40 MG tablet, TAKE 1 TABLET BY MOUTH EVERY NIGHT, Disp: 90 tablet, Rfl: 1  •  cholecalciferol (VITAMIN D3) 25 MCG (1000 UT) tablet, Take 1 tablet by mouth Daily., Disp: , Rfl:   •  Hydrocortisone, Perianal, (ANUSOL-HC) 2.5 % rectal cream, APPLY TOPICALLY TO  THE AFFECTED AREA THREE TIMES DAILY AS NEEDED FOR HEMORRHOIDS, Disp: , Rfl:   •  lisinopril (PRINIVIL,ZESTRIL) 10 MG tablet, Take 1 tablet by mouth Daily., Disp: 30 tablet, Rfl: 3  •  metoprolol tartrate (LOPRESSOR) 25 MG tablet, TAKE 1 TABLET BY MOUTH EVERY 12 HOURS, Disp: 180 tablet, Rfl: 1  •  Omega-3 Fatty Acids (FISH OIL) 1000 MG capsule capsule, Take  by mouth Daily With Breakfast., Disp: , Rfl:   •  tamsulosin (FLOMAX) 0.4 MG capsule 24 hr capsule, Take 1 capsule by mouth Every Night., Disp: , Rfl:   •  triazolam (HALCION) 0.25 MG tablet, Take 1 tablet by mouth At Night As Needed for Sleep. Triazolam 0.25mg 1 tab - 2 tab at bedtime PRN, Disp: , Rfl:   •  HYDROcodone-acetaminophen (NORCO)  MG per tablet, Take 1 tablet by mouth 2 (Two) Times a Day As Needed for Moderate Pain., Disp: 60 tablet, Rfl: 0  •  methocarbamol (ROBAXIN) 500 MG tablet, Take 1 tablet by mouth 2 (Two) Times a Day As Needed for Muscle Spasms., Disp: 60 tablet, Rfl: 0    Review of Systems   Constitutional: Negative for fatigue.   Gastrointestinal: Negative for constipation.   Musculoskeletal: Positive for arthralgias, myalgias and neck pain.        Right shoulder pain       Vitals:    05/04/23 1445   BP: 117/65   Pulse: 76   Resp: 16   SpO2: 96%   PainSc:   5       Urine Drug Screen: 5/04/23  Appropriate: Pending    Objective   Physical Exam  Vitals and nursing note reviewed.   Constitutional:       General: He is not in acute distress.     Appearance: Normal appearance. He is normal weight.   Neck:      Comments: Cervical spine exam:  1.  Cervical paraspinals tender to palpation bilaterally  2.  Cervical facet loading negative bilaterally  3.  Spurling equivocal on both the left and the right  4.  Significant tenderness in the trapezius bilaterally  Musculoskeletal:      Right shoulder: Tenderness and crepitus present. Decreased range of motion. Decreased strength.      Cervical back: Neck supple. Tenderness present.   Neurological:       Mental Status: He is alert.           Assessment & Plan   Problems Addressed this Visit    None  Visit Diagnoses     High risk medication use    -  Primary    Relevant Orders    Urine Drug Screen - Urine, Clean Catch    Displacement of cervical intervertebral disc without myelopathy        Relevant Medications    HYDROcodone-acetaminophen (NORCO)  MG per tablet      Diagnoses       Codes Comments    High risk medication use    -  Primary ICD-10-CM: Z79.899  ICD-9-CM: V58.69     Displacement of cervical intervertebral disc without myelopathy     ICD-10-CM: M50.20  ICD-9-CM: 722.0           Plan:  1. Can try hydrocodone 10 mg twice daily as he reports he previously took this medication for many years  2. We will plan for Robaxin twice daily as he reports sedation with tizanidine.  3. UDS, Contract today  --- Follow-up 1 mos.           INSPECT REPORT    As part of the patient's treatment plan, I may be prescribing controlled substances. The patient has been made aware of appropriate use of such medications, including potential risk of somnolence, limited ability to drive and/or work safely, and the potential for dependence or overdose. It has also bee made clear that these medications are for use by this patient only, without concomitant use of alcohol or other substances unless prescribed.     Patient has completed prescribing agreement detailing terms of continued prescribing of controlled substances, including monitoring ESTEFANIA reports, urine drug screening, and pill counts if necessary. The patient is aware that inappropriate use will results in cessation of prescribing such medications.    INSPECT report has been reviewed and scanned into the patient's chart.    As the clinician, I personally reviewed the INSPECT from 05/03/23.    History and physical exam exhibit continued safe and appropriate use of controlled substances.      EMR Dragon/Transcription disclaimer:   Much of this encounter note is an  electronic transcription/translation of spoken language to printed text. The electronic translation of spoken language may permit erroneous, or at times, nonsensical words or phrases to be inadvertently transcribed; Although I have reviewed the note for such errors, some may still exist.

## 2023-05-15 NOTE — PROGRESS NOTES
"        Subjective     Chris Tinsley is a 73 y.o. male who presents today for initial evaluation.     Chief Complaint:  Depression, anxiety     Referral source:   Patient has a family member who goes here.     History of Present Illness:     At today's visit, patient states he has had problems since getting out of [place].   He used to drink a lot, states he self-medicated. Has been sober for about 20 years.   He is having anxiety.  He feels like he treats his wife worse than anything.   Medication trials in the past: [facility]--took a medicine and doesn't remember what it is.   He is having generalized anxiety.   He says he is sharp tempered with his wife.   It has gotten worse the last 2 years. He had a 5 way bypass--2019. 2007--small intestines removed from a bowel rupture.   Broke shoulder in 2020.   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    He has been  for 35 years.   He also feels like he has some depression.   He is on hydrocodone and muscle relaxers for his neck pain and neck spasms. He feels like that has really put him down.   He does have passive suicidal thoughts.. He says this happens \"once in a blue moon.\" He never comes up with a plan for how he would do this.   He has been having vertigo for a while. He is going to therapy for that.   He used to collazo a lot, 20 years ago. He feels like he broke himself from it.     He has done counseling, but it was about 50 years ago. He did not feel like it was helpful at that time.     Sleep: he is on triazolam for sleep. He sleeps for 4 hours and gets back up. Dr. Beach, family medicine.     Appetite: he has a pretty good appetite. He has lost about 10 pounds in 2 weeks.     He has one daughter. He has not talked to her in a long time. He sought help for that. She lives in [place].       Suicidal Ideation:  He states he sometimes thinks it would be easier to just give up, but he denies any " active suicidal ideation with any plan or intent.     History of suicide attempts:  None    Psychiatric Hospitalizations:   None    Family Psychiatric History:  None.     Social history:  He served in [place].   He lives with his wife at home. They have a puppy.   He has one daughter, but they are estranged. He lives in [place].     Substance use history:  Nicotine: No  THC: No  Alcohol: None current. Past heavy use, but last about 20 years ago.   Other illicit substances: No    Medical History:  Neck spasms, stage 2 kidney failure, Fatty liver,    Seizure history: No    Access to firearms:  Yes    Plan of Care:  We discussed starting sertraline. Will start 25mg for one month, then increase to 50mg once daily.       Ability and capacity to respond to treatment: good  Functional status: fair  Prognosis: good  Long term goals: improve depression and overall quality of life.   Short term goals:reduce anxiety. , improve depression.  and decrease irritability.   Strengths: Patient has a good support system in his wife.   Weaknesses: Patient has had symptoms for a long time, and had been reluctant to start treatment.     Patient presents with symptoms and behaviors that are consistent with the following DSM-5 diagnoses:  1. Major depressive disorder  2.  Generalized anxiety disorder    The following portions of the patient's history were reviewed and updated as appropriate: allergies, current medications, past family history, past medical history, past social history, past surgical history and problem list.    PAST OUTPATIENT TREATMENT  Diagnosis treated:  Affective Disorder, Anxiety/Panic Disorder  Treatment Type:  Medication Management  Prior Psychiatric Medications:  He tried one medication in the past but doesn't remember what it is.   Support Groups:  None  Sequelae Of Mental Disorder:  emotional distress    Interval History  New pt    Side Effects  None      Past Medical History:  Past Medical History:    Diagnosis Date   • Abnormal nuclear stress test 06/22/2019   • Chronic deep vein thrombosis (DVT) of left lower extremity    • BHATTI (nonalcoholic steatohepatitis) 06/2019   • Unstable angina 06/22/2019   • Vertigo        Social History:  Social History     Socioeconomic History   • Marital status:    Tobacco Use   • Smoking status: Never   • Smokeless tobacco: Never   Vaping Use   • Vaping Use: Never used   Substance and Sexual Activity   • Alcohol use: Not Currently     Comment: quit 20 yrs ago   • Drug use: No   • Sexual activity: Defer       Family History:  Family History   Problem Relation Age of Onset   • Heart disease Father        Past Surgical History:  Past Surgical History:   Procedure Laterality Date   • CARDIAC CATHETERIZATION N/A 6/22/2019    Procedure: LEFT HEART CATH with possible PCI;  Surgeon: Enmanuel Wild MD;  Location: [facility] CATH INVASIVE LOCATION;  Service: Cardiovascular   • COLON SURGERY     • CORONARY ARTERY BYPASS GRAFT N/A 6/24/2019    Procedure: CABG;  Surgeon: Jose Angel López MD;  Location: [facility] CVOR;  Service: Cardiothoracic   • LIVER BIOPSY  06/2019       Problem List:  Patient Active Problem List   Diagnosis   • Benign essential HTN   • Chronic pain   • Osteoarthritis   • Chronic deep vein thrombosis (DVT) of left lower extremity (HCC)   • Chronic insomnia   • Dizziness   • Shortness of breath   • Coronary artery disease involving native coronary artery with unstable angina pectoris   • S/P CABG (LIMA to LAD, SVG to PDA, SVG to lateral branch of ramus, SVG to OM1 --medial branch of ramus) per Dr. López 6/24/2019   • Mixed hyperlipidemia   • Family history of diabetes mellitus   • Generalized anxiety disorder   • Post-herpetic polyneuropathy   • Proteinuria   • Stage 3a chronic kidney disease   • BHATTI (nonalcoholic steatohepatitis)       Allergy:   Allergies   Allergen Reactions   • Ibuprofen Nausea And Vomiting        Discontinued Medications:  Medications  Discontinued During This Encounter   Medication Reason   • Hydrocortisone, Perianal, (ANUSOL-HC) 2.5 % rectal cream *Therapy completed       Current Medications:   Current Outpatient Medications   Medication Sig Dispense Refill   • acyclovir (ZOVIRAX) 800 MG tablet      • aspirin 81 MG chewable tablet Chew 1 tablet Daily.     • atorvastatin (LIPITOR) 40 MG tablet TAKE 1 TABLET BY MOUTH EVERY NIGHT 90 tablet 1   • cholecalciferol (VITAMIN D3) 25 MCG (1000 UT) tablet Take 1 tablet by mouth Daily.     • HYDROcodone-acetaminophen (NORCO)  MG per tablet Take 1 tablet by mouth 2 (Two) Times a Day As Needed for Moderate Pain. 60 tablet 0   • lisinopril (PRINIVIL,ZESTRIL) 10 MG tablet Take 1 tablet by mouth Daily. 30 tablet 3   • methocarbamol (ROBAXIN) 500 MG tablet Take 1 tablet by mouth 2 (Two) Times a Day As Needed for Muscle Spasms. 60 tablet 0   • metoprolol tartrate (LOPRESSOR) 25 MG tablet TAKE 1 TABLET BY MOUTH EVERY 12 HOURS 180 tablet 1   • Omega-3 Fatty Acids (FISH OIL) 1000 MG capsule capsule Take  by mouth Daily With Breakfast.     • tamsulosin (FLOMAX) 0.4 MG capsule 24 hr capsule Take 1 capsule by mouth Every Night.     • triazolam (HALCION) 0.25 MG tablet Take 1 tablet by mouth At Night As Needed for Sleep. Triazolam 0.25mg 1 tab - 2 tab at bedtime PRN     • amLODIPine (NORVASC) 5 MG tablet Take 1 tab BID (Patient not taking: Reported on 5/16/2023) 60 tablet 2   • sertraline (Zoloft) 25 MG tablet Take 1 tablet by mouth Daily. 30 tablet 0   • [START ON 6/13/2023] sertraline (Zoloft) 50 MG tablet Take 1 tablet by mouth Daily. 30 tablet 2     No current facility-administered medications for this visit.         Psychological ROS: positive for - anxiety, depression and irritability  negative for - behavioral disorder, concentration difficulties, decreased libido, disorientation, hallucinations, hostility, memory difficulties, mood swings, obsessive thoughts, physical abuse, sexual abuse, sleep disturbances  "or suicidal ideation      Physical Exam:   Blood pressure 128/84, pulse 75, height 172.7 cm (68\"), weight 81.4 kg (179 lb 6.4 oz), SpO2 96 %.    Mental Status Exam:   Hygiene:   good  Cooperation:  Cooperative  Eye Contact:  Good  Psychomotor Behavior:  Appropriate  Affect:  Appropriate  Mood: normal  Hopelessness: Denies  Speech:  Normal  Thought Process:  Goal directed and Linear  Thought Content:  Mood congruent  Suicidal:  None  Homicidal:  None  Hallucinations:  None  Delusion:  None  Memory:  Intact  Orientation:  Person, Place, Time and Situation  Reliability:  good  Insight:  Good  Judgement:  Good  Impulse Control:  Good  Physical/Medical Issues:  No        PHQ-9 Depression Screening    Little interest or pleasure in doing things? 2-->more than half the days   Feeling down, depressed, or hopeless? 2-->more than half the days   Trouble falling or staying asleep, or sleeping too much? 3-->nearly every day   Feeling tired or having little energy? 1-->several days   Poor appetite or overeating? 2-->more than half the days   Feeling bad about yourself - or that you are a failure or have let yourself or your family down? 2-->more than half the days   Trouble concentrating on things, such as reading the newspaper or watching television? 3-->nearly every day   Moving or speaking so slowly that other people could have noticed? Or the opposite - being so fidgety or restless that you have been moving around a lot more than usual? 0-->not at all   Thoughts that you would be better off dead, or of hurting yourself in some way? 0-->not at all   PHQ-9 Total Score 15   If you checked off any problems, how difficult have these problems made it for you to do your work, take care of things at home, or get along with other people? somewhat difficult        Never smoker    I advised Chris of the risks of tobacco use.     Result Review:    Labs:  Lab on 04/13/2023   Component Date Value Ref Range Status   • Glucose 04/13/2023 " 105 (H)  65 - 99 mg/dL Final   • BUN 04/13/2023 25 (H)  8 - 23 mg/dL Final   • Creatinine 04/13/2023 1.42 (H)  0.76 - 1.27 mg/dL Final   • Sodium 04/13/2023 136  136 - 145 mmol/L Final   • Potassium 04/13/2023 4.2  3.5 - 5.2 mmol/L Final    Slight hemolysis detected by analyzer. Results may be affected.   • Chloride 04/13/2023 102  98 - 107 mmol/L Final   • CO2 04/13/2023 23.8  22.0 - 29.0 mmol/L Final   • Calcium 04/13/2023 9.6  8.6 - 10.5 mg/dL Final   • BUN/Creatinine Ratio 04/13/2023 17.6  7.0 - 25.0 Final   • Anion Gap 04/13/2023 10.2  5.0 - 15.0 mmol/L Final   • eGFR 04/13/2023 52.8 (L)  >60.0 mL/min/1.73 Final       Assessment & Plan   Diagnoses and all orders for this visit:    1. Moderate episode of recurrent major depressive disorder (Primary)  -     sertraline (Zoloft) 25 MG tablet; Take 1 tablet by mouth Daily.  Dispense: 30 tablet; Refill: 0  -     sertraline (Zoloft) 50 MG tablet; Take 1 tablet by mouth Daily.  Dispense: 30 tablet; Refill: 2    2. Generalized anxiety disorder  -     sertraline (Zoloft) 25 MG tablet; Take 1 tablet by mouth Daily.  Dispense: 30 tablet; Refill: 0  -     sertraline (Zoloft) 50 MG tablet; Take 1 tablet by mouth Daily.  Dispense: 30 tablet; Refill: 2       Start sertraline 25mg daily. Increase to 50mg daily after one month.     Visit Diagnoses:    ICD-10-CM ICD-9-CM   1. Moderate episode of recurrent major depressive disorder  F33.1 296.32   2. Generalized anxiety disorder  F41.1 300.02       TREATMENT PLAN/GOALS: Continue supportive psychotherapy efforts and medications as indicated. Treatment and medication options discussed during today's visit. Patient ackowledged and verbally consented to continue with current treatment plan and was educated on the importance of compliance with treatment and follow-up appointments.    CRISIS RESOURCES:    In the event you have personal crisis, there are several resources to reach someone to talk with:    • 988 Suicide and Crisis  Lifeline  Call or text 988 or chat [employer]  • [facility]'s National Helpline  5-795-521-HELP (4357)  Text your zip code to [address] (HELP4U)  • [place]'s Crisis Line  Dial 988, then press 1  Text 575546    MEDICATION ISSUES:  INSPECT reviewed as expected    Discussed medication options and treatment plan of prescribed medication as well as the risks, benefits, and side effects including potential falls, possible impaired driving and metabolic adversities among others. Patient is agreeable to call the office with any worsening of symptoms or onset of side effects. Patient is agreeable to call 911 or go to the nearest ER should he/she begin having SI/HI. No medication side effects or related complaints today.     MEDS ORDERED DURING VISIT:  New Medications Ordered This Visit   Medications   • sertraline (Zoloft) 25 MG tablet     Sig: Take 1 tablet by mouth Daily.     Dispense:  30 tablet     Refill:  0   • sertraline (Zoloft) 50 MG tablet     Sig: Take 1 tablet by mouth Daily.     Dispense:  30 tablet     Refill:  2     Start after one month on 25mg.       Return in about 3 months (around 8/16/2023).         This document has been electronically signed by TORI Mckeon  May 16, 2023 10:35 EDT    Part of this note may be an electronic transcription/translation of spoken language to printed text using the Dragon Dictation System.

## 2023-05-16 ENCOUNTER — OFFICE VISIT (OUTPATIENT)
Dept: PSYCHIATRY | Facility: CLINIC | Age: 73
End: 2023-05-16
Payer: MEDICARE

## 2023-05-16 VITALS
WEIGHT: 179.4 LBS | BODY MASS INDEX: 27.19 KG/M2 | OXYGEN SATURATION: 96 % | SYSTOLIC BLOOD PRESSURE: 128 MMHG | DIASTOLIC BLOOD PRESSURE: 84 MMHG | HEIGHT: 68 IN | HEART RATE: 75 BPM

## 2023-05-16 DIAGNOSIS — F33.1 MODERATE EPISODE OF RECURRENT MAJOR DEPRESSIVE DISORDER: Primary | Chronic | ICD-10-CM

## 2023-05-16 DIAGNOSIS — F41.1 GENERALIZED ANXIETY DISORDER: Chronic | ICD-10-CM

## 2023-05-16 RX ORDER — ACYCLOVIR 800 MG/1
TABLET ORAL
COMMUNITY
Start: 2023-05-15

## 2023-05-16 RX ORDER — SERTRALINE HYDROCHLORIDE 25 MG/1
25 TABLET, FILM COATED ORAL DAILY
Qty: 30 TABLET | Refills: 0 | Status: SHIPPED | OUTPATIENT
Start: 2023-05-16 | End: 2024-05-15

## 2023-05-16 NOTE — PATIENT INSTRUCTIONS
Please return to clinic at your next scheduled visit.    We recommend you arrive 15 minutes prior to your appointment time to complete check in and insurance verification. Follow-up appointments for medication management are generally 15 minutes long. If you arrive more than 5 minutes late for your appointment, you may be asked to reschedule. This is to keep the clinic running smoothly and on-time, and to respect the appointment times that are given to all patients.   Contact the clinic ([phone]) at least 24 hours prior in the event you need to cancel or reschedule.    Note: Failure to give at least a 24 hour notice can result in being marked a same day cancel or a no show for your appointment. Per office policy, after 3 no shows, or 3 same day cancels in a 1 year period, you may be dismissed from the clinic.     Take all medications as prescribed.      If you are in need of medication refills, please call the clinic at [phone], 3-5 days before you are to run out of medication.   Medication refills requested on Friday may not be filled until the following week.     Should you need to get in touch with your provider, TORI Sherman, contact the office ([phone]), prior to 4pm and Renetta, her medical assistant will be able to send her a message.   If your call is sent to [email or web address], leave a message and Renetta will call you back within 24 hours.     If you have an emergency after 4pm or on weekends, you can contact the provider on call by calling [phone]. You will receive the answering service, leave a message, and the provider on call will be contacted.      If you are feeling like you are suicidal or want to harm yourself or someone else, please call 911, or present to any local emergency room to be evaluated.   [facility] in [place], as well as [facility] in [place], each offer emergency psychiatric evaluation.     In the event you have personal  crisis, there are several resources to reach someone to talk with.   988 Suicide and Crisis Lifeline    Call or text 988 or chat [employer]88[employer]org  [facility]'s National Helpline    0-458-690-HELP (4854)    Text your zip code to 366222 (HELP4U)  [place]'s Crisis Line    Dial 988, then press 1    Text 715952    You will NOT be able to contact provider on [employer], as Behavioral Health Providers are restricted. YOU MUST CALL.

## 2023-05-25 ENCOUNTER — OFFICE VISIT (OUTPATIENT)
Dept: PAIN MEDICINE | Facility: CLINIC | Age: 73
End: 2023-05-25
Payer: COMMERCIAL

## 2023-05-25 VITALS
RESPIRATION RATE: 16 BRPM | DIASTOLIC BLOOD PRESSURE: 88 MMHG | SYSTOLIC BLOOD PRESSURE: 150 MMHG | HEART RATE: 70 BPM | OXYGEN SATURATION: 99 %

## 2023-05-25 DIAGNOSIS — M50.20 DISPLACEMENT OF CERVICAL INTERVERTEBRAL DISC WITHOUT MYELOPATHY: ICD-10-CM

## 2023-05-25 DIAGNOSIS — Z79.899 HIGH RISK MEDICATION USE: Primary | ICD-10-CM

## 2023-05-25 PROCEDURE — G0463 HOSPITAL OUTPT CLINIC VISIT: HCPCS | Performed by: STUDENT IN AN ORGANIZED HEALTH CARE EDUCATION/TRAINING PROGRAM

## 2023-05-25 RX ORDER — HYDROCODONE BITARTRATE AND ACETAMINOPHEN 10; 325 MG/1; MG/1
1 TABLET ORAL 2 TIMES DAILY PRN
Qty: 60 TABLET | Refills: 0 | Status: SHIPPED | OUTPATIENT
Start: 2023-06-07

## 2023-05-25 RX ORDER — METHOCARBAMOL 500 MG/1
500 TABLET, FILM COATED ORAL 2 TIMES DAILY PRN
Qty: 60 TABLET | Refills: 1 | Status: SHIPPED | OUTPATIENT
Start: 2023-05-25

## 2023-05-25 RX ORDER — HYDROCODONE BITARTRATE AND ACETAMINOPHEN 10; 325 MG/1; MG/1
1 TABLET ORAL 2 TIMES DAILY PRN
Qty: 60 TABLET | Refills: 0 | Status: SHIPPED | OUTPATIENT
Start: 2023-07-06

## 2023-05-25 RX ORDER — HYDROCODONE BITARTRATE AND ACETAMINOPHEN 10; 325 MG/1; MG/1
1 TABLET ORAL 2 TIMES DAILY PRN
Qty: 60 TABLET | Refills: 0 | Status: SHIPPED | OUTPATIENT
Start: 2023-08-05

## 2023-05-25 NOTE — PROGRESS NOTES
CHIEF COMPLAINT  Chief Complaint   Patient presents with   • Neck Pain     Norco  last dose   this am       Primary Care  Dwain Peter MD    Subjective   Chris Tinsley is a 73 y.o. male  who presents for right shoulder pain medication management.  He states that approximately 6 months ago, he sustained a fall off a ladder and subsequently fractured the head of the humerus.  He also sustained a rotator cuff injury resulting in biceps tendinopathy as well as adhesive capsulitis.  He is currently being treated conservatively with his orthopedic surgeon, but continues to require pain medication.  He is currently working with physical therapy as well as exercising several times a day at home.    Neck Pain          Location: Right shoulder  Onset: 6 months ago  Duration: Improving  Timing: Constant throughout the day  Quality: Sharp, stabbing  Severity: Today: 3       Last Week: 3       Worst: 7  Modifying Factors: The pain is worse with exercising and working with physical therapy    Physical Therapy: yes    Interval Update 05/25/2023: He reports some benefit with hydrocodone 10 mg twice daily as well as Robaxin    The following portions of the patient's history were reviewed and updated as appropriate: allergies, current medications, past family history, past medical history, past social history, past surgical history and problem list.    Procedures:  1. 4/6/2023: Cervical epidural: 0% benefit      Current Outpatient Medications:   •  acyclovir (ZOVIRAX) 800 MG tablet, , Disp: , Rfl:   •  amLODIPine (NORVASC) 5 MG tablet, Take 1 tab BID, Disp: 60 tablet, Rfl: 2  •  aspirin 81 MG chewable tablet, Chew 1 tablet Daily., Disp: , Rfl:   •  atorvastatin (LIPITOR) 40 MG tablet, TAKE 1 TABLET BY MOUTH EVERY NIGHT, Disp: 90 tablet, Rfl: 1  •  cholecalciferol (VITAMIN D3) 25 MCG (1000 UT) tablet, Take 1 tablet by mouth Daily., Disp: , Rfl:   •  [START ON 8/5/2023] HYDROcodone-acetaminophen (NORCO)  MG per tablet,  Take 1 tablet by mouth 2 (Two) Times a Day As Needed for Moderate Pain., Disp: 60 tablet, Rfl: 0  •  lisinopril (PRINIVIL,ZESTRIL) 10 MG tablet, Take 1 tablet by mouth Daily., Disp: 30 tablet, Rfl: 3  •  methocarbamol (ROBAXIN) 500 MG tablet, Take 1 tablet by mouth 2 (Two) Times a Day As Needed for Muscle Spasms., Disp: 60 tablet, Rfl: 1  •  metoprolol tartrate (LOPRESSOR) 25 MG tablet, TAKE 1 TABLET BY MOUTH EVERY 12 HOURS, Disp: 180 tablet, Rfl: 1  •  Omega-3 Fatty Acids (FISH OIL) 1000 MG capsule capsule, Take  by mouth Daily With Breakfast., Disp: , Rfl:   •  sertraline (Zoloft) 25 MG tablet, Take 1 tablet by mouth Daily., Disp: 30 tablet, Rfl: 0  •  [START ON 6/13/2023] sertraline (Zoloft) 50 MG tablet, Take 1 tablet by mouth Daily., Disp: 30 tablet, Rfl: 2  •  tamsulosin (FLOMAX) 0.4 MG capsule 24 hr capsule, Take 1 capsule by mouth Every Night., Disp: , Rfl:   •  triazolam (HALCION) 0.25 MG tablet, Take 1 tablet by mouth At Night As Needed for Sleep. Triazolam 0.25mg 1 tab - 2 tab at bedtime PRN, Disp: , Rfl:   •  [START ON 7/6/2023] HYDROcodone-acetaminophen (NORCO)  MG per tablet, Take 1 tablet by mouth 2 (Two) Times a Day As Needed for Moderate Pain., Disp: 60 tablet, Rfl: 0  •  [START ON 6/7/2023] HYDROcodone-acetaminophen (NORCO)  MG per tablet, Take 1 tablet by mouth 2 (Two) Times a Day As Needed for Moderate Pain., Disp: 60 tablet, Rfl: 0    Review of Systems   Constitutional: Negative for fatigue.   Gastrointestinal: Negative for constipation.   Musculoskeletal: Positive for arthralgias, myalgias and neck pain.        Right shoulder pain       Vitals:    05/25/23 1456   BP: 150/88   Pulse: 70   Resp: 16   SpO2: 99%   PainSc:   3       Urine Drug Screen: 5/04/23  Appropriate: Yes    Objective   Physical Exam  Vitals and nursing note reviewed.   Constitutional:       General: He is not in acute distress.     Appearance: Normal appearance. He is normal weight.   Neck:      Comments: Cervical  spine exam:  1.  Cervical paraspinals tender to palpation bilaterally  2.  Cervical facet loading negative bilaterally  3.  Spurling equivocal on both the left and the right  4.  Significant tenderness in the trapezius bilaterally  Musculoskeletal:      Right shoulder: Tenderness and crepitus present. Decreased range of motion. Decreased strength.      Cervical back: Neck supple. Tenderness present.   Neurological:      Mental Status: He is alert.           Assessment & Plan   Problems Addressed this Visit    None  Visit Diagnoses     High risk medication use    -  Primary    Displacement of cervical intervertebral disc without myelopathy        Relevant Medications    HYDROcodone-acetaminophen (NORCO)  MG per tablet (Start on 8/5/2023)    HYDROcodone-acetaminophen (NORCO)  MG per tablet (Start on 7/6/2023)    HYDROcodone-acetaminophen (NORCO)  MG per tablet (Start on 6/7/2023)      Diagnoses       Codes Comments    High risk medication use    -  Primary ICD-10-CM: Z79.899  ICD-9-CM: V58.69     Displacement of cervical intervertebral disc without myelopathy     ICD-10-CM: M50.20  ICD-9-CM: 722.0           Plan:  1. Continue hydrocodone 10 mg twice daily  2. We will plan for Robaxin twice daily as he reports sedation with tizanidine.  3. UDS, Contract today  --- Follow-up 3 months           INSPECT REPORT    As part of the patient's treatment plan, I may be prescribing controlled substances. The patient has been made aware of appropriate use of such medications, including potential risk of somnolence, limited ability to drive and/or work safely, and the potential for dependence or overdose. It has also bee made clear that these medications are for use by this patient only, without concomitant use of alcohol or other substances unless prescribed.     Patient has completed prescribing agreement detailing terms of continued prescribing of controlled substances, including monitoring ESTEFANIA reports,  urine drug screening, and pill counts if necessary. The patient is aware that inappropriate use will results in cessation of prescribing such medications.    INSPECT report has been reviewed and scanned into the patient's chart.    As the clinician, I personally reviewed the INSPECT from 5/24/2023.    History and physical exam exhibit continued safe and appropriate use of controlled substances.      EMR Dragon/Transcription disclaimer:   Much of this encounter note is an electronic transcription/translation of spoken language to printed text. The electronic translation of spoken language may permit erroneous, or at times, nonsensical words or phrases to be inadvertently transcribed; Although I have reviewed the note for such errors, some may still exist.

## 2023-07-24 RX ORDER — LISINOPRIL 10 MG/1
10 TABLET ORAL DAILY
Qty: 90 TABLET | Refills: 1 | Status: SHIPPED | OUTPATIENT
Start: 2023-07-24

## 2023-07-25 ENCOUNTER — OFFICE VISIT (OUTPATIENT)
Dept: FAMILY MEDICINE CLINIC | Facility: CLINIC | Age: 73
End: 2023-07-25
Payer: MEDICARE

## 2023-07-25 VITALS
BODY MASS INDEX: 27.61 KG/M2 | TEMPERATURE: 97.9 F | SYSTOLIC BLOOD PRESSURE: 110 MMHG | HEART RATE: 80 BPM | OXYGEN SATURATION: 96 % | WEIGHT: 182.2 LBS | HEIGHT: 68 IN | DIASTOLIC BLOOD PRESSURE: 69 MMHG

## 2023-07-25 DIAGNOSIS — R35.1 NOCTURIA: ICD-10-CM

## 2023-07-25 DIAGNOSIS — F51.04 CHRONIC INSOMNIA: Primary | Chronic | ICD-10-CM

## 2023-07-25 PROCEDURE — 3074F SYST BP LT 130 MM HG: CPT | Performed by: NURSE PRACTITIONER

## 2023-07-25 PROCEDURE — 3078F DIAST BP <80 MM HG: CPT | Performed by: NURSE PRACTITIONER

## 2023-07-25 PROCEDURE — 99213 OFFICE O/P EST LOW 20 MIN: CPT | Performed by: NURSE PRACTITIONER

## 2023-07-25 RX ORDER — TAMSULOSIN HYDROCHLORIDE 0.4 MG/1
1 CAPSULE ORAL NIGHTLY
Qty: 90 CAPSULE | Refills: 1 | Status: SHIPPED | OUTPATIENT
Start: 2023-07-25

## 2023-07-25 NOTE — PROGRESS NOTES
Subjective   Chris Tinsley is a 73 y.o. male presents for   Chief Complaint   Patient presents with    Establish Care     Patient had went to old provider and then transferred back.  Wanting a referral to sleep doctor.      Pain     Patient see's pain management       Health Maintenance Due   Topic Date Due    COLORECTAL CANCER SCREENING  Never done    ZOSTER VACCINE (3 of 3) 08/24/2018    HEPATITIS C SCREENING  Never done    ANNUAL WELLNESS VISIT  Never done    COVID-19 Vaccine (7 - Moderna series) 02/06/2023       The patient presents to establish care and for a referral to sleep medicine.     He sees Dr. Bridget MD, for pain management. He had an epidural that was not effective. He was prescribed hydrocodone and a muscle relaxer after this, which provides significant benefit.     His previous primary care provider was Dr. Rome MD. He has seen Dr. Chirag MD, in the past to inquire about continuing a prescription for triazolam, which he was unable to do.     The patient has frequent nocturia. He requests a prescription for tamsulosin. His urinary frequency is more manageable during the day. He denies having an enlarged prostate.    He is taking acyclovir as needed for shingles outbreaks. He has received both shingles vaccinations. He is prone to shingles breakouts during high stress periods in his life, and he is currently concerned that he will have an outbreak as his wife recently lost her job. He typically develops 2 to 3 blisters near his coccyx. He has also developed shingles on his scalp and face.    The patient develops mild edema in his left lower extremity. He has a chronic deep vein thrombosis in his posterior knee, which developed when he had a resection of 12 to 16 inches his small bowel after a bowel obstruction approximately 8 years ago. He wears compression socks, which controls his edema.    The patient is still seeing TORI Wheatley. She prescribes the patient's Zoloft. He has an  "appointment to see her on 07/25/2023.    The patient is seeing Dr. Torri MD, for follow-up of hypertension and hyperlipidemia, who prescribes his blood pressure and cholesterol medications. He has annual follow-up. He has a history of coronary artery bypass surgery.    He has been seeing Dr. Samuel MD, every 6 months to follow his kidney function. He has an appointment in 08/2023. His follow-up has been extended to once annually as his kidney function has improved.    The patient has had a colonoscopy, and he is uncertain when he is due for a repeat. He had his tetanus vaccination a couple of years ago after he sustained a laceration.    He recently had laboratory work completed through [facility].    The patient is taking over-the-counter omega-3 supplements and vitamin D supplements.     Vitals:    07/25/23 0924   BP: 110/69   BP Location: Left arm   Patient Position: Sitting   Cuff Size: Adult   Pulse: 80   Temp: 97.9 °F (36.6 °C)   TempSrc: Temporal   SpO2: 96%   Weight: 82.6 kg (182 lb 3.2 oz)   Height: 172.7 cm (68\")     Body mass index is 27.7 kg/m².    Current Outpatient Medications on File Prior to Visit   Medication Sig Dispense Refill    aspirin 81 MG chewable tablet Chew 1 tablet Daily.      atorvastatin (LIPITOR) 40 MG tablet TAKE 1 TABLET BY MOUTH EVERY NIGHT 90 tablet 1    cholecalciferol (VITAMIN D3) 25 MCG (1000 UT) tablet Take 1 tablet by mouth Daily.      [START ON 8/5/2023] HYDROcodone-acetaminophen (NORCO)  MG per tablet Take 1 tablet by mouth 2 (Two) Times a Day As Needed for Moderate Pain. 60 tablet 0    HYDROcodone-acetaminophen (NORCO)  MG per tablet Take 1 tablet by mouth 2 (Two) Times a Day As Needed for Moderate Pain. 60 tablet 0    HYDROcodone-acetaminophen (NORCO)  MG per tablet Take 1 tablet by mouth 2 (Two) Times a Day As Needed for Moderate Pain. 60 tablet 0    lisinopril (PRINIVIL,ZESTRIL) 10 MG tablet TAKE 1 TABLET BY MOUTH DAILY 90 tablet 1    " methocarbamol (ROBAXIN) 500 MG tablet Take 1 tablet by mouth 2 (Two) Times a Day As Needed for Muscle Spasms. 60 tablet 1    metoprolol tartrate (LOPRESSOR) 25 MG tablet TAKE 1 TABLET BY MOUTH EVERY 12 HOURS 180 tablet 1    Omega-3 Fatty Acids (FISH OIL) 1000 MG capsule capsule Take  by mouth Daily With Breakfast.      sertraline (Zoloft) 50 MG tablet Take 1 tablet by mouth Daily. 30 tablet 2    [DISCONTINUED] acyclovir (ZOVIRAX) 800 MG tablet       [DISCONTINUED] tamsulosin (FLOMAX) 0.4 MG capsule 24 hr capsule Take 1 capsule by mouth Every Night.      triazolam (HALCION) 0.25 MG tablet Take 1 tablet by mouth At Night As Needed for Sleep. Triazolam 0.25mg 1 tab - 2 tab at bedtime PRN (Patient not taking: Reported on 7/25/2023)      [DISCONTINUED] amLODIPine (NORVASC) 5 MG tablet Take 1 tab BID 60 tablet 2    [DISCONTINUED] sertraline (Zoloft) 25 MG tablet Take 1 tablet by mouth Daily. 30 tablet 0     No current facility-administered medications on file prior to visit.       The following portions of the patient's history were reviewed and updated as appropriate: allergies, current medications, past family history, past medical history, past social history, past surgical history, and problem list.    Review of Systems   Genitourinary:  Positive for nocturia.     Objective   Physical Exam  Vitals and nursing note reviewed.   Constitutional:       Appearance: Normal appearance. He is well-developed.   HENT:      Head: Normocephalic and atraumatic.      Right Ear: External ear normal.      Left Ear: External ear normal.      Nose: Nose normal.   Eyes:      Extraocular Movements: Extraocular movements intact.      Pupils: Pupils are equal, round, and reactive to light.   Cardiovascular:      Rate and Rhythm: Normal rate and regular rhythm.      Heart sounds: Normal heart sounds.   Pulmonary:      Effort: Pulmonary effort is normal.      Breath sounds: Normal breath sounds.   Abdominal:      General: Bowel sounds are  normal.      Palpations: Abdomen is soft.   Musculoskeletal:         General: Normal range of motion.      Cervical back: Normal range of motion and neck supple.   Skin:     General: Skin is warm and dry.   Neurological:      General: No focal deficit present.      Mental Status: He is alert and oriented to person, place, and time.   Psychiatric:         Mood and Affect: Mood normal.         Behavior: Behavior normal.     PHQ-9 Total Score:      Assessment & Plan   Diagnoses and all orders for this visit:    1. Chronic insomnia (Primary)  -     Ambulatory Referral to Sleep Medicine    2. Nocturia  -     tamsulosin (FLOMAX) 0.4 MG capsule 24 hr capsule; Take 1 capsule by mouth Every Night.  Dispense: 90 capsule; Refill: 1      Chronic insomnia  - A referral was sent to sleep medicine.    Nocturia  - We will send a prescription for tamsulosin to the patient's pharmacy.    Health maintenance  - We will request the results of his recent blood work from [facility]. We will also request the results of his most recent colonoscopy. He will follow up in 1 year.    There are no Patient Instructions on file for this visit.         Transcribed from ambient dictation for TORI Gasca by Valerie Flynn.  07/25/23   12:04 EDT    Patient or patient representative verbalized consent to the visit recording.  I have personally performed the services described in this document as transcribed by the above individual, and it is both accurate and complete.

## 2023-08-08 ENCOUNTER — LAB (OUTPATIENT)
Dept: LAB | Facility: HOSPITAL | Age: 73
End: 2023-08-08
Payer: MEDICARE

## 2023-08-08 ENCOUNTER — TRANSCRIBE ORDERS (OUTPATIENT)
Dept: ADMINISTRATIVE | Facility: HOSPITAL | Age: 73
End: 2023-08-08
Payer: MEDICARE

## 2023-08-08 DIAGNOSIS — N40.0 ENLARGED PROSTATE: ICD-10-CM

## 2023-08-08 DIAGNOSIS — R80.9 PROTEINURIA, UNSPECIFIED TYPE: ICD-10-CM

## 2023-08-08 DIAGNOSIS — N18.31 CHRONIC KIDNEY DISEASE (CKD) STAGE G3A/A1, MODERATELY DECREASED GLOMERULAR FILTRATION RATE (GFR) BETWEEN 45-59 ML/MIN/1.73 SQUARE METER AND ALBUMINURIA CREATININE RATIO LESS THAN 30 MG/G (CMS/H*: ICD-10-CM

## 2023-08-08 DIAGNOSIS — N18.31 CHRONIC KIDNEY DISEASE (CKD) STAGE G3A/A1, MODERATELY DECREASED GLOMERULAR FILTRATION RATE (GFR) BETWEEN 45-59 ML/MIN/1.73 SQUARE METER AND ALBUMINURIA CREATININE RATIO LESS THAN 30 MG/G (CMS/H*: Primary | ICD-10-CM

## 2023-08-08 LAB
ALBUMIN SERPL-MCNC: 4.5 G/DL (ref 3.5–5.2)
ANION GAP SERPL CALCULATED.3IONS-SCNC: 10 MMOL/L (ref 5–15)
BUN SERPL-MCNC: 34 MG/DL (ref 8–23)
BUN/CREAT SERPL: 24.6 (ref 7–25)
CALCIUM SPEC-SCNC: 9.9 MG/DL (ref 8.6–10.5)
CHLORIDE SERPL-SCNC: 104 MMOL/L (ref 98–107)
CHOLEST SERPL-MCNC: 119 MG/DL (ref 0–200)
CO2 SERPL-SCNC: 24 MMOL/L (ref 22–29)
CREAT SERPL-MCNC: 1.38 MG/DL (ref 0.76–1.27)
CREAT UR-MCNC: 83.7 MG/DL
EGFRCR SERPLBLD CKD-EPI 2021: 54.7 ML/MIN/1.73
GLUCOSE SERPL-MCNC: 113 MG/DL (ref 65–99)
HDLC SERPL-MCNC: 43 MG/DL (ref 40–60)
LDLC SERPL CALC-MCNC: 51 MG/DL (ref 0–100)
LDLC/HDLC SERPL: 1.1 {RATIO}
PHOSPHATE SERPL-MCNC: 2.8 MG/DL (ref 2.5–4.5)
POTASSIUM SERPL-SCNC: 4.1 MMOL/L (ref 3.5–5.2)
PROT ?TM UR-MCNC: 78.7 MG/DL
PROT/CREAT UR: 940.3 MG/G CREA (ref 0–200)
SODIUM SERPL-SCNC: 138 MMOL/L (ref 136–145)
TRIGL SERPL-MCNC: 144 MG/DL (ref 0–150)
VLDLC SERPL-MCNC: 25 MG/DL (ref 5–40)

## 2023-08-08 PROCEDURE — 36415 COLL VENOUS BLD VENIPUNCTURE: CPT

## 2023-08-08 PROCEDURE — 82570 ASSAY OF URINE CREATININE: CPT

## 2023-08-08 PROCEDURE — 84156 ASSAY OF PROTEIN URINE: CPT

## 2023-08-08 PROCEDURE — 80061 LIPID PANEL: CPT

## 2023-08-08 PROCEDURE — 80069 RENAL FUNCTION PANEL: CPT

## 2023-08-09 DIAGNOSIS — M50.20 DISPLACEMENT OF CERVICAL INTERVERTEBRAL DISC WITHOUT MYELOPATHY: ICD-10-CM

## 2023-08-09 RX ORDER — HYDROCODONE BITARTRATE AND ACETAMINOPHEN 10; 325 MG/1; MG/1
1 TABLET ORAL 2 TIMES DAILY PRN
Qty: 60 TABLET | Refills: 0 | Status: SHIPPED | OUTPATIENT
Start: 2023-08-09

## 2023-08-11 ENCOUNTER — TELEPHONE (OUTPATIENT)
Dept: PAIN MEDICINE | Facility: CLINIC | Age: 73
End: 2023-08-11
Payer: MEDICARE

## 2023-08-11 RX ORDER — METHOCARBAMOL 500 MG/1
500 TABLET, FILM COATED ORAL 2 TIMES DAILY PRN
Qty: 60 TABLET | Refills: 1 | Status: SHIPPED | OUTPATIENT
Start: 2023-08-11

## 2023-08-11 NOTE — TELEPHONE ENCOUNTER
Caller: Chris Tinsley    Relationship: Self    Best call back number:     Requested Prescriptions:   Requested Prescriptions     Pending Prescriptions Disp Refills    methocarbamol (ROBAXIN) 500 MG tablet 60 tablet 1     Sig: Take 1 tablet by mouth 2 (Two) Times a Day As Needed for Muscle Spasms.        Pharmacy where request should be sent: [id number] - [address] AT [facility] OF [address] & [address] [phone] [place] [phone] FX     Last office visit with prescribing clinician: 5/25/2023   Last telemedicine visit with prescribing clinician: Visit date not found   Next office visit with prescribing clinician: 9/1/2023     Does the patient have less than a 3 day supply:  [x] Yes  [] No    Would you like a call back once the refill request has been completed: [x] Yes [] No    If the office needs to give you a call back, can they leave a voicemail: [x] Yes [] No    Janny Sharp Rep   08/11/23 13:35 EDT

## 2023-08-14 ENCOUNTER — TELEPHONE (OUTPATIENT)
Dept: FAMILY MEDICINE CLINIC | Facility: CLINIC | Age: 73
End: 2023-08-14
Payer: MEDICARE

## 2023-08-14 RX ORDER — ACYCLOVIR 800 MG/1
800 TABLET ORAL
Qty: 35 TABLET | Refills: 0 | Status: SHIPPED | OUTPATIENT
Start: 2023-08-14 | End: 2023-08-15 | Stop reason: SDUPTHER

## 2023-08-14 NOTE — PROGRESS NOTES
Subjective     Chris Tinsley is a 73 y.o. male who presents today for follow up for psychiatric medication management.     Chief Complaint:  Depression, anxiety     History of Present Illness:     Medication adjustments last visit:   Start sertraline 25mg daily. Increase to 50mg daily after one month.     At today's visit, he is accompanied to the appointment with his wife. He states she has recently lost her job, and as a result, he has lost his insurance. [facility].   He is having a lot of depression since his wife lost his job.   He is having lots of irritability.   No suicidal thoughts. No HI/AVH.   We discussed adding abilify for irritability which he was agreeable to.   We also discussed adding hydroxyzine as needed for anxiety. He wanted to hold off on that medication for now, but will call and let me know if he wants me to send it in.   No suicidal thoughts. No HI/AVH.     Patient presents with symptoms and behaviors that are consistent with the following DSM-5 diagnoses:  Major depressive disorder  2.  Generalized anxiety disorder    The following portions of the patient's history were reviewed and updated as appropriate: allergies, current medications, past family history, past medical history, past social history, past surgical history and problem list.    PAST OUTPATIENT TREATMENT  Diagnosis treated:  Affective Disorder, Anxiety/Panic Disorder  Treatment Type:  Medication Management  Prior Psychiatric Medications:  He tried one medication in the past but doesn't remember what it is.   Support Groups:  None  Sequelae Of Mental Disorder:  emotional distress    Interval History  New pt    Side Effects  None      Past Medical History:  Past Medical History:   Diagnosis Date    Abnormal nuclear stress test 06/22/2019    Chronic deep vein thrombosis (DVT) of left lower extremity     BHATTI (nonalcoholic steatohepatitis) 06/2019    Unstable  angina 06/22/2019    Vertigo        Social History:  Social History     Socioeconomic History    Marital status:    Tobacco Use    Smoking status: Never     Passive exposure: Never    Smokeless tobacco: Never   Vaping Use    Vaping Use: Never used   Substance and Sexual Activity    Alcohol use: Not Currently     Comment: quit 20 yrs ago    Drug use: No    Sexual activity: Defer       Family History:  Family History   Problem Relation Age of Onset    Heart disease Father        Past Surgical History:  Past Surgical History:   Procedure Laterality Date    CARDIAC CATHETERIZATION N/A 6/22/2019    Procedure: LEFT HEART CATH with possible PCI;  Surgeon: Enmanuel Wild MD;  Location: [facility] CATH INVASIVE LOCATION;  Service: Cardiovascular    COLON SURGERY      CORONARY ARTERY BYPASS GRAFT N/A 6/24/2019    Procedure: CABG;  Surgeon: Jose Angel López MD;  Location: [facility] CVOR;  Service: Cardiothoracic    LIVER BIOPSY  06/2019       Problem List:  Patient Active Problem List   Diagnosis    Benign essential HTN    Chronic pain    Osteoarthritis    Chronic deep vein thrombosis (DVT) of left lower extremity    Chronic insomnia    Dizziness    Shortness of breath    Coronary artery disease involving native coronary artery with unstable angina pectoris    S/P CABG (LIMA to LAD, SVG to PDA, SVG to lateral branch of ramus, SVG to OM1 --medial branch of ramus) per Dr. López 6/24/2019    Mixed hyperlipidemia    Family history of diabetes mellitus    Generalized anxiety disorder    Post-herpetic polyneuropathy    Proteinuria    Stage 3a chronic kidney disease    BHATTI (nonalcoholic steatohepatitis)       Allergy:   Allergies   Allergen Reactions    Ibuprofen Nausea And Vomiting        Discontinued Medications:  Medications Discontinued During This Encounter   Medication Reason    sertraline (Zoloft) 50 MG tablet Reorder         Current Medications:   Current Outpatient Medications   Medication Sig Dispense Refill     sertraline (Zoloft) 50 MG tablet Take 1 tablet by mouth Daily. 30 tablet 5    acyclovir (ZOVIRAX) 800 MG tablet Take 1 tablet by mouth 5 (Five) Times a Day for 10 days. 50 tablet 0    ARIPiprazole (ABILIFY) 5 MG tablet Take 1 tablet by mouth Daily. 30 tablet 5    aspirin 81 MG chewable tablet Chew 1 tablet Daily.      atorvastatin (LIPITOR) 40 MG tablet TAKE 1 TABLET BY MOUTH EVERY NIGHT 90 tablet 1    cholecalciferol (VITAMIN D3) 25 MCG (1000 UT) tablet Take 1 tablet by mouth Daily.      HYDROcodone-acetaminophen (NORCO)  MG per tablet Take 1 tablet by mouth 2 (Two) Times a Day As Needed for Moderate Pain. 60 tablet 0    lisinopril (PRINIVIL,ZESTRIL) 10 MG tablet TAKE 1 TABLET BY MOUTH DAILY 90 tablet 1    methocarbamol (ROBAXIN) 500 MG tablet Take 1 tablet by mouth 2 (Two) Times a Day As Needed for Muscle Spasms. 60 tablet 1    metoprolol tartrate (LOPRESSOR) 25 MG tablet TAKE 1 TABLET BY MOUTH EVERY 12 HOURS 180 tablet 1    Omega-3 Fatty Acids (FISH OIL) 1000 MG capsule capsule Take  by mouth Daily With Breakfast.      tamsulosin (FLOMAX) 0.4 MG capsule 24 hr capsule Take 1 capsule by mouth Every Night. 90 capsule 1     No current facility-administered medications for this visit.         Psychological ROS: positive for - anxiety, depression and irritability  negative for - behavioral disorder, concentration difficulties, decreased libido, disorientation, hallucinations, hostility, memory difficulties, mood swings, obsessive thoughts, physical abuse, sexual abuse, sleep disturbances or suicidal ideation      Physical Exam:   There were no vitals taken for this visit.    Mental Status Exam:   Hygiene:   good  Cooperation:  Cooperative  Eye Contact:  Good  Psychomotor Behavior:  Appropriate  Affect:  Appropriate  Mood: normal  Hopelessness: Denies  Speech:  Normal  Thought Process:  Goal directed and Linear  Thought Content:  Mood congruent  Suicidal:  None  Homicidal:  None  Hallucinations:  None  Delusion:   None  Memory:  Intact  Orientation:  Person, Place, Time and Situation  Reliability:  good  Insight:  Good  Judgement:  Good  Impulse Control:  Good  Physical/Medical Issues:  No      Mental status exam was reviewed and compared to visit on 5/15/23 and appropriate updates were made.     PHQ-9 Depression Screening    Little interest or pleasure in doing things? 2-->more than half the days   Feeling down, depressed, or hopeless? 1-->several days   Trouble falling or staying asleep, or sleeping too much? 3-->nearly every day   Feeling tired or having little energy? 2-->more than half the days   Poor appetite or overeating? 3-->nearly every day   Feeling bad about yourself - or that you are a failure or have let yourself or your family down? 0-->not at all   Trouble concentrating on things, such as reading the newspaper or watching television? 2-->more than half the days   Moving or speaking so slowly that other people could have noticed? Or the opposite - being so fidgety or restless that you have been moving around a lot more than usual? 1-->several days   Thoughts that you would be better off dead, or of hurting yourself in some way? 0-->not at all   PHQ-9 Total Score 14   If you checked off any problems, how difficult have these problems made it for you to do your work, take care of things at home, or get along with other people?          Never smoker    I advised Chris of the risks of tobacco use.     Result Review:    Labs:  Lab on 08/08/2023   Component Date Value Ref Range Status    Glucose 08/08/2023 113 (H)  65 - 99 mg/dL Final    BUN 08/08/2023 34 (H)  8 - 23 mg/dL Final    Creatinine 08/08/2023 1.38 (H)  0.76 - 1.27 mg/dL Final    Sodium 08/08/2023 138  136 - 145 mmol/L Final    Potassium 08/08/2023 4.1  3.5 - 5.2 mmol/L Final    Chloride 08/08/2023 104  98 - 107 mmol/L Final    CO2 08/08/2023 24.0  22.0 - 29.0 mmol/L Final    Calcium 08/08/2023 9.9  8.6 - 10.5 mg/dL Final    Albumin 08/08/2023 4.5  3.5 -  5.2 g/dL Final    Phosphorus 08/08/2023 2.8  2.5 - 4.5 mg/dL Final    Anion Gap 08/08/2023 10.0  5.0 - 15.0 mmol/L Final    BUN/Creatinine Ratio 08/08/2023 24.6  7.0 - 25.0 Final    eGFR 08/08/2023 54.7 (L)  >60.0 mL/min/1.73 Final    Protein/Creatinine Ratio, Urine 08/08/2023 940.3 (H)  0.0 - 200.0 mg/G Crea Final    Creatinine, Urine 08/08/2023 83.7  mg/dL Final    Total Protein, Urine 08/08/2023 78.7  mg/dL Final    Total Cholesterol 08/08/2023 119  0 - 200 mg/dL Final    Triglycerides 08/08/2023 144  0 - 150 mg/dL Final    HDL Cholesterol 08/08/2023 43  40 - 60 mg/dL Final    LDL Cholesterol  08/08/2023 51  0 - 100 mg/dL Final    VLDL Cholesterol 08/08/2023 25  5 - 40 mg/dL Final    LDL/HDL Ratio 08/08/2023 1.10   Final       Assessment & Plan   Diagnoses and all orders for this visit:    1. Moderate episode of recurrent major depressive disorder (Primary)  -     sertraline (Zoloft) 50 MG tablet; Take 1 tablet by mouth Daily.  Dispense: 30 tablet; Refill: 5  -     ARIPiprazole (ABILIFY) 5 MG tablet; Take 1 tablet by mouth Daily.  Dispense: 30 tablet; Refill: 5    2. Generalized anxiety disorder  -     sertraline (Zoloft) 50 MG tablet; Take 1 tablet by mouth Daily.  Dispense: 30 tablet; Refill: 5  -     ARIPiprazole (ABILIFY) 5 MG tablet; Take 1 tablet by mouth Daily.  Dispense: 30 tablet; Refill: 5         Continue sertraline 50g daily.   Start aripiprazole 5mg daily.     Visit Diagnoses:    ICD-10-CM ICD-9-CM   1. Moderate episode of recurrent major depressive disorder  F33.1 296.32   2. Generalized anxiety disorder  F41.1 300.02         TREATMENT PLAN/GOALS: Continue supportive psychotherapy efforts and medications as indicated. Treatment and medication options discussed during today's visit. Patient ackowledged and verbally consented to continue with current treatment plan and was educated on the importance of compliance with treatment and follow-up appointments.    CRISIS RESOURCES:    In the event you have  personal crisis, there are several resources to reach someone to talk with:    983 Suicide and Crisis Lifeline  Call or text 981 or chat [employer]org  [facility]'s National Helpline  3-337-741-HELP (4357)  Text your zip code to 083382 (HELP4U)  [facility]'s Crisis Line  Dial 988, then press 1  Text 690471    MEDICATION ISSUES:  INSPECT reviewed as expected    Discussed medication options and treatment plan of prescribed medication as well as the risks, benefits, and side effects including potential falls, possible impaired driving and metabolic adversities among others. Patient is agreeable to call the office with any worsening of symptoms or onset of side effects. Patient is agreeable to call 911 or go to the nearest ER should he/she begin having SI/HI. No medication side effects or related complaints today.     MEDS ORDERED DURING VISIT:  New Medications Ordered This Visit   Medications    sertraline (Zoloft) 50 MG tablet     Sig: Take 1 tablet by mouth Daily.     Dispense:  30 tablet     Refill:  5    ARIPiprazole (ABILIFY) 5 MG tablet     Sig: Take 1 tablet by mouth Daily.     Dispense:  30 tablet     Refill:  5       Return in about 6 months (around 2/15/2024).         This document has been electronically signed by TORI Mckeon  August 15, 2023 12:41 EDT    Part of this note may be an electronic transcription/translation of spoken language to printed text using the Dragon Dictation System.

## 2023-08-14 NOTE — TELEPHONE ENCOUNTER
Patient wife asking for a script of Acyclovir. Patient has another case of the shingles. Acyclovir worked well last time. He is trying to avoide coming in for appt, as his wife has lost her job and has no secondary insurance at this time. Last script was Acyclovir 800mg, 1 tab 5 times a day

## 2023-08-14 NOTE — TELEPHONE ENCOUNTER
Prescription sent.  If symptoms do not resolve or they worsen he will need an appointment for further evaluation.

## 2023-08-15 ENCOUNTER — OFFICE VISIT (OUTPATIENT)
Dept: PSYCHIATRY | Facility: CLINIC | Age: 73
End: 2023-08-15
Payer: OTHER GOVERNMENT

## 2023-08-15 DIAGNOSIS — F41.1 GENERALIZED ANXIETY DISORDER: Chronic | ICD-10-CM

## 2023-08-15 DIAGNOSIS — F33.1 MODERATE EPISODE OF RECURRENT MAJOR DEPRESSIVE DISORDER: Primary | Chronic | ICD-10-CM

## 2023-08-15 PROCEDURE — 99214 OFFICE O/P EST MOD 30 MIN: CPT

## 2023-08-15 PROCEDURE — 1160F RVW MEDS BY RX/DR IN RCRD: CPT

## 2023-08-15 PROCEDURE — 1159F MED LIST DOCD IN RCRD: CPT

## 2023-08-15 RX ORDER — ARIPIPRAZOLE 5 MG/1
5 TABLET ORAL DAILY
Qty: 30 TABLET | Refills: 5 | Status: SHIPPED | OUTPATIENT
Start: 2023-08-15

## 2023-08-15 RX ORDER — ACYCLOVIR 800 MG/1
800 TABLET ORAL
Qty: 50 TABLET | Refills: 0 | Status: SHIPPED | OUTPATIENT
Start: 2023-08-15 | End: 2023-08-15 | Stop reason: SDUPTHER

## 2023-08-17 ENCOUNTER — TELEPHONE (OUTPATIENT)
Dept: FAMILY MEDICINE CLINIC | Facility: CLINIC | Age: 73
End: 2023-08-17
Payer: OTHER GOVERNMENT

## 2023-08-17 DIAGNOSIS — Z12.5 SCREENING FOR PROSTATE CANCER: Primary | ICD-10-CM

## 2023-08-17 NOTE — TELEPHONE ENCOUNTER
It appears we have not done a PSA here.  Ask if he has gone to urology in the past, and if not we can check it at his next appointment.

## 2023-08-18 NOTE — TELEPHONE ENCOUNTER
Spoke to Eleanor. She is out today but will try to have him run up today or Monday Morning. I told her we prefer a schedule but if he runs up its also ok

## 2023-08-21 ENCOUNTER — CLINICAL SUPPORT (OUTPATIENT)
Dept: FAMILY MEDICINE CLINIC | Facility: CLINIC | Age: 73
End: 2023-08-21
Payer: MEDICARE

## 2023-08-21 DIAGNOSIS — Z12.5 SCREENING FOR PROSTATE CANCER: ICD-10-CM

## 2023-08-21 PROCEDURE — G0103 PSA SCREENING: HCPCS | Performed by: NURSE PRACTITIONER

## 2023-08-21 PROCEDURE — 36415 COLL VENOUS BLD VENIPUNCTURE: CPT | Performed by: NURSE PRACTITIONER

## 2023-08-21 NOTE — PROGRESS NOTES
Venipuncture performed on Left Arm by Leann Dominguez MA  with good hemostasis. Patient tolerated well. 08/21/23  TORI Gasca

## 2023-08-22 ENCOUNTER — TELEPHONE (OUTPATIENT)
Dept: FAMILY MEDICINE CLINIC | Facility: CLINIC | Age: 73
End: 2023-08-22
Payer: OTHER GOVERNMENT

## 2023-08-22 DIAGNOSIS — R97.20 ELEVATED PSA: Primary | ICD-10-CM

## 2023-08-22 LAB — PSA SERPL-MCNC: 5.83 NG/ML (ref 0–4)

## 2023-08-22 NOTE — TELEPHONE ENCOUNTER
HUB TO READ  ----- Message from TORI Gasca sent at 8/22/2023  8:10 AM EDT -----  Prostate level is elevated.  I will refer to urology for further evaluation.

## 2023-08-25 NOTE — TELEPHONE ENCOUNTER
[facility]. Gal with the [facility] called me to say that Chris has to have a consult with [facility] to get the referral for Urology. I faxed to a different number to see if we get a different response.

## 2023-09-01 ENCOUNTER — OFFICE VISIT (OUTPATIENT)
Dept: PAIN MEDICINE | Facility: CLINIC | Age: 73
End: 2023-09-01
Payer: MEDICARE

## 2023-09-01 VITALS
RESPIRATION RATE: 16 BRPM | SYSTOLIC BLOOD PRESSURE: 119 MMHG | DIASTOLIC BLOOD PRESSURE: 74 MMHG | HEART RATE: 72 BPM | OXYGEN SATURATION: 98 %

## 2023-09-01 DIAGNOSIS — M50.20 DISPLACEMENT OF CERVICAL INTERVERTEBRAL DISC WITHOUT MYELOPATHY: ICD-10-CM

## 2023-09-01 DIAGNOSIS — Z79.899 HIGH RISK MEDICATION USE: Primary | ICD-10-CM

## 2023-09-01 PROCEDURE — G0463 HOSPITAL OUTPT CLINIC VISIT: HCPCS | Performed by: STUDENT IN AN ORGANIZED HEALTH CARE EDUCATION/TRAINING PROGRAM

## 2023-09-01 RX ORDER — HYDROCODONE BITARTRATE AND ACETAMINOPHEN 10; 325 MG/1; MG/1
1 TABLET ORAL EVERY 12 HOURS PRN
Qty: 60 TABLET | Refills: 0 | Status: SHIPPED | OUTPATIENT
Start: 2023-11-06

## 2023-09-01 RX ORDER — HYDROCODONE BITARTRATE AND ACETAMINOPHEN 10; 325 MG/1; MG/1
1 TABLET ORAL 2 TIMES DAILY PRN
Qty: 60 TABLET | Refills: 0 | Status: SHIPPED | OUTPATIENT
Start: 2023-09-08

## 2023-09-01 RX ORDER — METHOCARBAMOL 500 MG/1
500 TABLET, FILM COATED ORAL 2 TIMES DAILY PRN
Qty: 60 TABLET | Refills: 1 | Status: SHIPPED | OUTPATIENT
Start: 2023-09-01

## 2023-09-01 RX ORDER — HYDROCODONE BITARTRATE AND ACETAMINOPHEN 10; 325 MG/1; MG/1
1 TABLET ORAL EVERY 12 HOURS PRN
Qty: 60 TABLET | Refills: 0 | Status: SHIPPED | OUTPATIENT
Start: 2023-10-07

## 2023-09-01 NOTE — PROGRESS NOTES
CHIEF COMPLAINT  Chief Complaint   Patient presents with    Neck Pain     LD Hydrocodone @ 0830 9/1/23       Primary Care  Nichole Paul, APRN    Subjective   Chris Tinsley is a 73 y.o. male  who presents for right shoulder pain medication management.  He states that approximately 6 months ago, he sustained a fall off a ladder and subsequently fractured the head of the humerus.  He also sustained a rotator cuff injury resulting in biceps tendinopathy as well as adhesive capsulitis.  He is currently being treated conservatively with his orthopedic surgeon, but continues to require pain medication.  He is currently working with physical therapy as well as exercising several times a day at home.    Neck Pain        Location: Right shoulder  Onset: 6 months ago  Duration: Improving  Timing: Constant throughout the day  Quality: Sharp, stabbing  Severity: Today: 3       Last Week: 3       Worst: 7  Modifying Factors: The pain is worse with exercising and working with physical therapy    Physical Therapy: yes    Interval Update 09/01/2023: Overall fairly stable with his medication.  Denies constipation or sedation.  Is considering undergoing shoulder surgery in the winter    The following portions of the patient's history were reviewed and updated as appropriate: allergies, current medications, past family history, past medical history, past social history, past surgical history and problem list.    Procedures:  4/6/2023: Cervical epidural: 0% benefit      Current Outpatient Medications:     ARIPiprazole (ABILIFY) 5 MG tablet, Take 1 tablet by mouth Daily., Disp: 30 tablet, Rfl: 5    aspirin 81 MG chewable tablet, Chew 1 tablet Daily., Disp: , Rfl:     atorvastatin (LIPITOR) 40 MG tablet, TAKE 1 TABLET BY MOUTH EVERY NIGHT, Disp: 90 tablet, Rfl: 1    cholecalciferol (VITAMIN D3) 25 MCG (1000 UT) tablet, Take 1 tablet by mouth Daily., Disp: , Rfl:     [START ON 9/8/2023] HYDROcodone-acetaminophen (NORCO)  MG per  tablet, Take 1 tablet by mouth 2 (Two) Times a Day As Needed for Moderate Pain., Disp: 60 tablet, Rfl: 0    lisinopril (PRINIVIL,ZESTRIL) 10 MG tablet, TAKE 1 TABLET BY MOUTH DAILY, Disp: 90 tablet, Rfl: 1    methocarbamol (ROBAXIN) 500 MG tablet, Take 1 tablet by mouth 2 (Two) Times a Day As Needed for Muscle Spasms., Disp: 60 tablet, Rfl: 1    metoprolol tartrate (LOPRESSOR) 25 MG tablet, TAKE 1 TABLET BY MOUTH EVERY 12 HOURS, Disp: 180 tablet, Rfl: 1    Omega-3 Fatty Acids (FISH OIL) 1000 MG capsule capsule, Take  by mouth Daily With Breakfast., Disp: , Rfl:     predniSONE (DELTASONE) 20 MG tablet, Take 2 tablets by mouth once daily for 3 days, then take 1 tablet by mouth once daily for 3 days, then take half a tablet once daily for 3 days., Disp: 12 tablet, Rfl: 0    sertraline (Zoloft) 50 MG tablet, Take 1 tablet by mouth Daily., Disp: 30 tablet, Rfl: 5    tamsulosin (FLOMAX) 0.4 MG capsule 24 hr capsule, Take 1 capsule by mouth Every Night., Disp: 90 capsule, Rfl: 1    [START ON 10/7/2023] HYDROcodone-acetaminophen (NORCO)  MG per tablet, Take 1 tablet by mouth Every 12 (Twelve) Hours As Needed for Moderate Pain., Disp: 60 tablet, Rfl: 0    [START ON 11/6/2023] HYDROcodone-acetaminophen (NORCO)  MG per tablet, Take 1 tablet by mouth Every 12 (Twelve) Hours As Needed for Moderate Pain., Disp: 60 tablet, Rfl: 0    Review of Systems   Constitutional:  Negative for fatigue.   Gastrointestinal:  Negative for constipation.   Musculoskeletal:  Positive for arthralgias, myalgias and neck pain.        Right shoulder pain     Vitals:    09/01/23 0851   BP: 119/74   Pulse: 72   Resp: 16   SpO2: 98%   PainSc:   2       Urine Drug Screen: 5/04/23  Appropriate: Yes    Objective   Physical Exam  Vitals and nursing note reviewed.   Constitutional:       General: He is not in acute distress.     Appearance: Normal appearance. He is normal weight.   Neck:      Comments: Cervical spine exam:  1.  Cervical paraspinals  tender to palpation bilaterally  2.  Cervical facet loading negative bilaterally  3.  Spurling equivocal on both the left and the right  4.  Significant tenderness in the trapezius bilaterally  Musculoskeletal:      Right shoulder: Tenderness and crepitus present. Decreased range of motion. Decreased strength.      Cervical back: Neck supple. Tenderness present.   Neurological:      Mental Status: He is alert.         Assessment & Plan   Problems Addressed this Visit    None  Visit Diagnoses       High risk medication use    -  Primary    Displacement of cervical intervertebral disc without myelopathy        Relevant Medications    HYDROcodone-acetaminophen (NORCO)  MG per tablet (Start on 9/8/2023)    HYDROcodone-acetaminophen (NORCO)  MG per tablet (Start on 10/7/2023)    HYDROcodone-acetaminophen (NORCO)  MG per tablet (Start on 11/6/2023)          Diagnoses         Codes Comments    High risk medication use    -  Primary ICD-10-CM: Z79.899  ICD-9-CM: V58.69     Displacement of cervical intervertebral disc without myelopathy     ICD-10-CM: M50.20  ICD-9-CM: 722.0             Plan:  Continue hydrocodone 10 mg twice daily  We will plan for Robaxin twice daily as he reports sedation with tizanidine.  UDS and inspect appropriate  --- Follow-up 3 months           INSPECT REPORT    As part of the patient's treatment plan, I may be prescribing controlled substances. The patient has been made aware of appropriate use of such medications, including potential risk of somnolence, limited ability to drive and/or work safely, and the potential for dependence or overdose. It has also bee made clear that these medications are for use by this patient only, without concomitant use of alcohol or other substances unless prescribed.     Patient has completed prescribing agreement detailing terms of continued prescribing of controlled substances, including monitoring ESTEFANIA reports, urine drug screening, and pill  counts if necessary. The patient is aware that inappropriate use will results in cessation of prescribing such medications.    INSPECT report has been reviewed and scanned into the patient's chart.    As the clinician, I personally reviewed the INSPECT from 8/30/2023.    History and physical exam exhibit continued safe and appropriate use of controlled substances.      EMR Dragon/Transcription disclaimer:   Much of this encounter note is an electronic transcription/translation of spoken language to printed text. The electronic translation of spoken language may permit erroneous, or at times, nonsensical words or phrases to be inadvertently transcribed; Although I have reviewed the note for such errors, some may still exist.

## 2023-09-08 DIAGNOSIS — M50.20 DISPLACEMENT OF CERVICAL INTERVERTEBRAL DISC WITHOUT MYELOPATHY: ICD-10-CM

## 2023-09-08 RX ORDER — HYDROCODONE BITARTRATE AND ACETAMINOPHEN 10; 325 MG/1; MG/1
1 TABLET ORAL EVERY 12 HOURS PRN
Qty: 60 TABLET | Refills: 0 | Status: SHIPPED | OUTPATIENT
Start: 2023-11-06

## 2023-09-08 RX ORDER — HYDROCODONE BITARTRATE AND ACETAMINOPHEN 10; 325 MG/1; MG/1
1 TABLET ORAL 2 TIMES DAILY PRN
Qty: 60 TABLET | Refills: 0 | Status: SHIPPED | OUTPATIENT
Start: 2023-09-08

## 2023-09-08 RX ORDER — HYDROCODONE BITARTRATE AND ACETAMINOPHEN 10; 325 MG/1; MG/1
1 TABLET ORAL EVERY 12 HOURS PRN
Qty: 60 TABLET | Refills: 0 | Status: SHIPPED | OUTPATIENT
Start: 2023-10-07

## 2023-09-12 DIAGNOSIS — F33.1 MODERATE EPISODE OF RECURRENT MAJOR DEPRESSIVE DISORDER: Chronic | ICD-10-CM

## 2023-09-12 DIAGNOSIS — F41.1 GENERALIZED ANXIETY DISORDER: Chronic | ICD-10-CM

## 2023-09-12 RX ORDER — ARIPIPRAZOLE 5 MG/1
5 TABLET ORAL DAILY
Qty: 90 TABLET | Refills: 1 | Status: SHIPPED | OUTPATIENT
Start: 2023-09-12

## 2023-10-02 RX ORDER — ATORVASTATIN CALCIUM 40 MG/1
TABLET, FILM COATED ORAL
Qty: 90 TABLET | Refills: 1 | Status: SHIPPED | OUTPATIENT
Start: 2023-10-02

## 2023-10-10 ENCOUNTER — TELEPHONE (OUTPATIENT)
Dept: PAIN MEDICINE | Facility: CLINIC | Age: 73
End: 2023-10-10
Payer: OTHER GOVERNMENT

## 2023-10-10 DIAGNOSIS — M50.20 DISPLACEMENT OF CERVICAL INTERVERTEBRAL DISC WITHOUT MYELOPATHY: ICD-10-CM

## 2023-10-10 RX ORDER — HYDROCODONE BITARTRATE AND ACETAMINOPHEN 10; 325 MG/1; MG/1
1 TABLET ORAL EVERY 12 HOURS PRN
Qty: 60 TABLET | Refills: 0 | Status: SHIPPED | OUTPATIENT
Start: 2023-10-10

## 2023-10-10 NOTE — TELEPHONE ENCOUNTER
Caller: Chris Tinsley    Relationship: Self    Best call back number: [phone]    What is the best time to reach you: ANY     Who are you requesting to speak with (clinical staff, provider,  specific staff member): CLINICAL     Do you know the name of the person who called: YES     What was the call regarding: PATIENT STATES THAT [facility] CANNOT FILL THE HYDROcodone-acetaminophen (NORCO)  MG per tablet - THEY DO NOT HAVE THE MEDS IN STOCK. ADVISED THE PATIENT TO HAVE THE PROVIDER CHANGE THE MEDICATION DUE TO MOST PHARMACY'S NOT HAVING THE MEDICATION

## 2023-10-10 NOTE — TELEPHONE ENCOUNTER
Provider: DR VALE     Caller: EDYTA MONTAÑO    Relationship to Patient: WIFE    Pharmacy: [facility] [place], [place]     Phone Number: [phone]    Reason for Call: PATIENTS WIFE FOUND A PHARMACY THAT HAS THE  MG HYDROCODONE IN STOCK AND THEY WOULD LIKE IT TO BE SENT TO THE [facility] ON [facility].

## 2023-10-11 ENCOUNTER — OFFICE VISIT (OUTPATIENT)
Dept: CARDIOLOGY | Facility: CLINIC | Age: 73
End: 2023-10-11
Payer: MEDICARE

## 2023-10-11 VITALS
HEART RATE: 65 BPM | BODY MASS INDEX: 26.98 KG/M2 | SYSTOLIC BLOOD PRESSURE: 138 MMHG | DIASTOLIC BLOOD PRESSURE: 81 MMHG | WEIGHT: 178 LBS | OXYGEN SATURATION: 98 % | HEIGHT: 68 IN

## 2023-10-11 DIAGNOSIS — I10 BENIGN ESSENTIAL HTN: Chronic | ICD-10-CM

## 2023-10-11 DIAGNOSIS — Z95.1 S/P CABG (CORONARY ARTERY BYPASS GRAFT): Primary | ICD-10-CM

## 2023-10-11 DIAGNOSIS — E78.2 MIXED HYPERLIPIDEMIA: ICD-10-CM

## 2023-10-11 DIAGNOSIS — I25.110 CORONARY ARTERY DISEASE INVOLVING NATIVE CORONARY ARTERY OF NATIVE HEART WITH UNSTABLE ANGINA PECTORIS: ICD-10-CM

## 2023-10-11 NOTE — PROGRESS NOTES
Cardiology Office Visit      Encounter Date:  10/11/2023    Patient ID:   Chris Tinsley is a 73 y.o. male.    Reason For Followup:  Coronary artery disease  Hypertension  Hyperlipidemia  Chronic renal insufficiency    Brief Clinical History:  Dear Dr. Peter, Dwain Bello MD    I had the pleasure of seeing Crhis Tinsley today. As you are well aware, this is a 73 y.o. male who presented to [facility] with c/o dizziness, SOA, and diaphoresis.  He ruled out for acute coronary syndrome.  Cardiology was consulted and pt had stress test that revealed prior MI and miles-infarct ischemia.  Pt had subsequent cardiac cath revealing MV CAD, EF 50%.   On 6/24/2019, he underwent CABG x5.         Interval History:  Denies any new cardiac symptoms  Denies any exertional symptoms of chest pain shortness of breath dizziness or syncope  Compliant with medical therapy  Elevated PSA schedule for prostate biopsy    Assessment & Plan    Impressions:  Multivessel coronary artery disease status post coronary artery bypass surgery/2019  Normal LV systolic function  Coronary artery disease  Mild cardiomyopathy with LV ejection fraction of 50%  Hypertension  Hyperipidemia  Acute on chronic renal insufficiency creatinine is back to baseline/recent labs with a stable creatinine  Normal myocardial perfusion study in January 2023    Recommendations:    Continue aggressive risk factor modification  Medications reviewed with the patient  Continue current medical therapy  Recent labs reviewed and discussed with patient   Test results and labs discussed with the patient  Recent labs work-up and stress test results during the hospital visit reviewed and discussed with patient  Continue follow-up with nephrology for chronic renal insufficiency  Continue current medical therapy with fish oil supplements metoprolol 25 mg p.o. twice daily Lipitor 40 mg p.o. once a day aspirin 81 mg p.o. once a day lisinopril 10 mg p.o. once a day  Patient is low risk to  undergo the planned prostate biopsy  Okay to hold aspirin if needed for prostate biopsy  Continued aggressive risk factor modification  Close monitoring of blood pressure at home  Recent labs and work-up reviewed and discussed with patient  Follow-up in office in 6 months            Lab Results   Component Value Date    GLUCOSE 113 (H) 08/08/2023    BUN 34 (H) 08/08/2023    CREATININE 1.38 (H) 08/08/2023    EGFR 54.7 (L) 08/08/2023    BCR 24.6 08/08/2023    K 4.1 08/08/2023    CO2 24.0 08/08/2023    CALCIUM 9.9 08/08/2023    ALBUMIN 4.5 08/08/2023    BILITOT 0.5 01/04/2023    AST 16 01/04/2023    ALT 16 01/04/2023     Results for orders placed during the hospital encounter of 06/20/19    Adult Transthoracic Echo Complete W/ Cont if Necessary Per Protocol    Interpretation Summary  ú The left ventricular cavity is mildly dilated.  ú Left ventricular wall thickness is consistent with mild-to-moderate concentric hypertrophy.  ú Left ventricular systolic function is low normal.  ú Left ventricular diastolic dysfunction (grade I a) consistent with impaired relaxation.  ú Left atrial cavity size is mild-to-moderately dilated.    Mildly dilated left ventricle with mild to moderate concentric left ventricle hypertrophy with diastolic dysfunction with overall EF of 50%  Mild to moderate biatrial enlargement  No effusion  Trace tricuspid regurgitation without pulmonary hypertension     Results for orders placed during the hospital encounter of 06/20/19    Cardiac Catheterization/Vascular Study    Narrative  CARDIAC CATHETERIZATION REPORT    DATE OF PROCEDURE: 6/22/2019    INDICATION FOR PROCEDURE:    Unstable angina  Abnormal stress test with large area of inducible ischemia    PROCEDURE PERFORMED:  1.Left heart catheterization  2. coronary angiography  3. left ventriculography    PROCEDURE COMMENTS:    Informed consent was obtained from the patient after explaining risks and benefits.  Patient was brought to the cardiac  interventional artery and placed on the table in the usual fashion.  Right groin was shaved and prepped in sterile fashion.  2% again was used with midsternal anesthesia to the right groin.  A total of 20 cc was used.  A 6 [place] sheath was placed in the right femoral artery.  A 6 [place] pigtail catheter, 6 [place] JR4 catheter and a 6 [place] JL4 catheter was used for the procedure.  After the cardiac catheterization is complete, all the catheters and sheath were removed.  Patient tolerated the procedure very well.  No complications noted.    FINDINGS:    1. HEMODYNAMICS:  , ventricular end-diastolic pressure 14-18, /84 with a mean pressure 111.    2. LEFT VENTRICULOGRAPHY: EF 50%, mild to moderate inferior wall hypokinesis , 1-2+ mitral regurgitation.    3. CORONARY ANGIOGRAPHY:    Left main: Normal    LAD: Proximal angiographic 70 to 80% stenosis.  Mid to distal focal area of 70% stenosis.  First diagonal branch is a moderate sized vessel with diffuse proximal 70% stenosis.    Left circumflex artery: 100% occlusion in the midportion with left to left collaterals filling the OM branch.    RCA: 100% occlusion in the proximal portion with left-to-right collaterals.    SUMMARY:  Severe three-vessel coronary artery disease.  100% occlusion of the circumflex and 100% occlusion of the right coronary artery with left-to-right and left to left collaterals  Significant stenosis involving the LAD and diagonal branches  Mild LV systolic dysfunction with significant hypokinesis involving the basal inferior wall with an estimated LV contraction of 50%  1-2+ mitral regurgitation    RECOMMENDATIONS:  Surgical evaluation for consideration for a coronary artery bypass surgery  Echocardiogram to assess the LV systolic function and also rule out any significant valve pathology  Observe patient in PCU bed  Optimize medical therapy  Test results discussed the patient and family        Enmanuel Wild,  "MD  6/22/2019  10:49 AM     Lab Results   Component Value Date    CHOL 119 08/08/2023    TRIG 144 08/08/2023    HDL 43 08/08/2023    LDL 51 08/08/2023      Results for orders placed during the hospital encounter of 01/03/23    Stress Test With Myocardial Perfusion One Day    Interpretation Summary    Myocardial perfusion study shows moderate size severe intensity mostly fixed perfusion defect involving the basal inferolateral wall suggestive of prior myocardial infarction with no significant reversible ischemia    Left ventricular ejection fraction is normal (Calculated EF = 60%).    Clinical correlation is recommended   Results for orders placed during the hospital encounter of 06/20/19    SCANNED - CARDIOLOGY           Objective:    Vitals:  Vitals:    10/11/23 1429   BP: 138/81   BP Location: Left arm   Patient Position: Sitting   Pulse: 65   SpO2: 98%   Weight: 80.7 kg (178 lb)   Height: 172.7 cm (68\")       Physical Exam:    General: Alert, cooperative, no distress, appears stated age  Head:  Normocephalic, atraumatic, mucous membranes moist  Eyes:  Conjunctiva/corneas clear, EOM's intact     Neck:  Supple,  no adenopathy;      Lungs: Clear to auscultation bilaterally, no wheezes rhonchi rales are noted  Chest wall: No tenderness  Heart::  Regular rate and rhythm, S1 and S2 normal, no murmur, rub or gallop  Abdomen: Soft, non-tender, nondistended bowel sounds active  Extremities: No cyanosis, clubbing, or edema  Pulses: 2+ and symmetric all extremities  Skin:  No rashes or lesions  Neuro/psych: A&O x3. CN II through XII are grossly intact with appropriate affect      Allergies:  Allergies   Allergen Reactions    Ibuprofen Nausea And Vomiting       Medication Review:     Current Outpatient Medications:     ARIPiprazole (ABILIFY) 5 MG tablet, Take 1 tablet by mouth Daily., Disp: 90 tablet, Rfl: 1    aspirin 81 MG chewable tablet, Chew 1 tablet Daily., Disp: , Rfl:     atorvastatin (LIPITOR) 40 MG tablet, TAKE 1 " TABLET BY MOUTH EVERY NIGHT, Disp: 90 tablet, Rfl: 1    cholecalciferol (VITAMIN D3) 25 MCG (1000 UT) tablet, Take 1 tablet by mouth Daily., Disp: , Rfl:     HYDROcodone-acetaminophen (NORCO)  MG per tablet, Take 1 tablet by mouth 2 (Two) Times a Day As Needed for Moderate Pain., Disp: 60 tablet, Rfl: 0    lisinopril (PRINIVIL,ZESTRIL) 10 MG tablet, TAKE 1 TABLET BY MOUTH DAILY, Disp: 90 tablet, Rfl: 1    methocarbamol (ROBAXIN) 500 MG tablet, Take 1 tablet by mouth 2 (Two) Times a Day As Needed for Muscle Spasms., Disp: 60 tablet, Rfl: 1    metoprolol tartrate (LOPRESSOR) 25 MG tablet, TAKE 1 TABLET BY MOUTH EVERY 12 HOURS, Disp: 180 tablet, Rfl: 1    Omega-3 Fatty Acids (FISH OIL) 1000 MG capsule capsule, Take  by mouth Daily With Breakfast., Disp: , Rfl:     sertraline (Zoloft) 50 MG tablet, Take 1 tablet by mouth Daily., Disp: 90 tablet, Rfl: 1    tamsulosin (FLOMAX) 0.4 MG capsule 24 hr capsule, Take 1 capsule by mouth Every Night., Disp: 90 capsule, Rfl: 1    triazolam (HALCION) 0.25 MG tablet, Take  by mouth., Disp: , Rfl:     [START ON 11/6/2023] HYDROcodone-acetaminophen (NORCO)  MG per tablet, Take 1 tablet by mouth Every 12 (Twelve) Hours As Needed for Moderate Pain., Disp: 60 tablet, Rfl: 0    HYDROcodone-acetaminophen (NORCO)  MG per tablet, Take 1 tablet by mouth Every 12 (Twelve) Hours As Needed for Moderate Pain., Disp: 60 tablet, Rfl: 0    Family History:  Family History   Problem Relation Age of Onset    Heart disease Father        Past Medical History:  Past Medical History:   Diagnosis Date    Abnormal nuclear stress test 06/22/2019    Chronic deep vein thrombosis (DVT) of left lower extremity     BHATTI (nonalcoholic steatohepatitis) 06/2019    Unstable angina 06/22/2019    Vertigo        Past surgical History:  Past Surgical History:   Procedure Laterality Date    CARDIAC CATHETERIZATION N/A 6/22/2019    Procedure: LEFT HEART CATH with possible PCI;  Surgeon: Enmanuel Wild  MD MIRANDA;  Location: [facility] CATH INVASIVE LOCATION;  Service: Cardiovascular    COLON SURGERY      CORONARY ARTERY BYPASS GRAFT N/A 6/24/2019    Procedure: CABG;  Surgeon: Jose Angel López MD;  Location: [facility] CVOR;  Service: Cardiothoracic    LIVER BIOPSY  06/2019       Social History:  Social History     Socioeconomic History    Marital status:    Tobacco Use    Smoking status: Never     Passive exposure: Never    Smokeless tobacco: Never   Vaping Use    Vaping Use: Never used   Substance and Sexual Activity    Alcohol use: Not Currently     Comment: quit 20 yrs ago    Drug use: No    Sexual activity: Defer       Review of Systems:  The following systems were reviewed as they relate to the cardiovascular system: Constitutional, Eyes, ENT, Cardiovascular, Respiratory, Gastrointestinal, Integumentary, Neurological, Psychiatric, Hematologic, Endocrine, Musculoskeletal, and Genitourinary. The pertinent cardiovascular findings are reported above with all other cardiovascular points within those systems being negative.    Diagnostic Study Review:     Current Electrocardiogram:    ECG 12 Lead    Date/Time: 10/11/2023 3:04 PM  Performed by: Enmanuel Wild MD    Authorized by: Enmanuel Wild MD  Comparison: compared with previous ECG   Similar to previous ECG  Rhythm: sinus rhythm  Rate: normal  BPM: 63  Conduction: conduction normal  QRS axis: normal  Other findings: non-specific ST-T wave changes    Clinical impression: abnormal EKG            Advance Care Planning   ACP discussion was held with the patient during this visit. Patient has an advance directive in EMR which is still valid.         NOTE: The following portions of the patient's history were reviewed and updated this visit as appropriate: allergies, current medications, past family history, past medical history, past social history, past surgical history and problem list.

## 2023-11-17 ENCOUNTER — TELEPHONE (OUTPATIENT)
Dept: FAMILY MEDICINE CLINIC | Facility: CLINIC | Age: 73
End: 2023-11-17
Payer: OTHER GOVERNMENT

## 2023-11-17 NOTE — TELEPHONE ENCOUNTER
spoke to patient - he was too busy and was on the road to talk-     HUB TO RELAY  I do not see a Neurologist  in system- I only see a Neurosurgeon

## 2023-11-17 NOTE — TELEPHONE ENCOUNTER
Caller: EDYTA LAURA    Relationship: Emergency Contact    Best call back number: [phone]    WIFE CALLED WONDERING IF PATIENT HAS SEEN A NEUROLOGIST AT ANY POINT, AND IF SO, WHAT IS THAT PROVIDER'S NAME?      PLEASE ADVISE

## 2023-11-20 ENCOUNTER — TELEPHONE (OUTPATIENT)
Dept: FAMILY MEDICINE CLINIC | Facility: CLINIC | Age: 73
End: 2023-11-20
Payer: OTHER GOVERNMENT

## 2023-11-20 NOTE — TELEPHONE ENCOUNTER
PATIENT'S WIFE STATES THAT THE PATIENT'S MOM AND DAD BOTH HAD DEMENTIA, AND SHE IS UNSURE IF HER  IS EXPECTING THIS. PATIENT'S WIFE STATES THAT THE DOCTOR NEEDS TO BE AN ENGLISH SPEAKER. PATIENT'S WIFE STATES THAT THIS WAS THE REASON THAT HE NEEDED TO SEE ONE.

## 2023-11-20 NOTE — TELEPHONE ENCOUNTER
Caller: EDYTA LAURA    Relationship: Emergency Contact    Best call back number: [phone]    What is the medical concern/diagnosis: A SURGERY PATIENT IS GOING TO HAVE    What specialty or service is being requested: NEUROLOGIST    Any additional details: PATIENT'S WIFE IS CALLING REQUESTING AN APPOINTMENT FOR A REFERRAL FOR A NEUROLOGIST BUT THE SOONEST APPOINTMENT WAS 12/19/23 AND THAT WAS TOO FAR AWAY AS PATIENT WOULD BE HAVING SURGERY IN 01/2024. PATIENT'S WIFE IS REQUESTING IF THE REFERRAL COULD BE PLACED BUT SHE DID NOT HAVE A SPECIFIC PERSON JUST NOT THE LAST PERSON HE SAW.SHE IS WANTING TO KNOE IF THERE IS ANYTHING SOONER THEY COULD GET OR IF THE REFERRAL COULD BE PLACED.

## 2023-11-20 NOTE — TELEPHONE ENCOUNTER
Chris Tinsley notified and voiced comprehension and understanding.    He is having his shoulder replaced.

## 2023-11-21 DIAGNOSIS — R41.3 MEMORY PROBLEM: Primary | ICD-10-CM

## 2023-11-21 NOTE — TELEPHONE ENCOUNTER
Patient showing signs of Dementia. Both parents had Dementia. They would like a referral to see Dr Seipel.

## 2023-11-30 ENCOUNTER — TELEPHONE (OUTPATIENT)
Dept: PSYCHIATRY | Facility: CLINIC | Age: 73
End: 2023-11-30
Payer: OTHER GOVERNMENT

## 2023-11-30 NOTE — TELEPHONE ENCOUNTER
"Patient called stating that he quit taking the Zoloft. It was making him sluggish. Plus he can't sleep. He was taking it at night.  I asked him if he tried taking it in the morning. He stated no and was really not interested in trying it. He wants something to help him sleep. He brought Xanax, and I informed him we do not prescribe xanax for sleep and then stated he had tried \"Triazalom\" and it worked well also.     "

## 2023-12-01 ENCOUNTER — OFFICE VISIT (OUTPATIENT)
Dept: PAIN MEDICINE | Facility: CLINIC | Age: 73
End: 2023-12-01
Payer: OTHER GOVERNMENT

## 2023-12-01 VITALS
RESPIRATION RATE: 16 BRPM | OXYGEN SATURATION: 93 % | SYSTOLIC BLOOD PRESSURE: 117 MMHG | DIASTOLIC BLOOD PRESSURE: 71 MMHG | HEART RATE: 75 BPM

## 2023-12-01 DIAGNOSIS — M25.511 CHRONIC PAIN OF BOTH SHOULDERS: Primary | ICD-10-CM

## 2023-12-01 DIAGNOSIS — M50.20 DISPLACEMENT OF CERVICAL INTERVERTEBRAL DISC WITHOUT MYELOPATHY: ICD-10-CM

## 2023-12-01 DIAGNOSIS — M25.512 CHRONIC PAIN OF BOTH SHOULDERS: Primary | ICD-10-CM

## 2023-12-01 DIAGNOSIS — G89.29 CHRONIC PAIN OF BOTH SHOULDERS: Primary | ICD-10-CM

## 2023-12-01 PROCEDURE — G0463 HOSPITAL OUTPT CLINIC VISIT: HCPCS | Performed by: STUDENT IN AN ORGANIZED HEALTH CARE EDUCATION/TRAINING PROGRAM

## 2023-12-01 PROCEDURE — 99214 OFFICE O/P EST MOD 30 MIN: CPT | Performed by: STUDENT IN AN ORGANIZED HEALTH CARE EDUCATION/TRAINING PROGRAM

## 2023-12-01 RX ORDER — ACYCLOVIR 800 MG/1
TABLET ORAL
COMMUNITY
Start: 2023-10-26

## 2023-12-01 RX ORDER — HYDROCODONE BITARTRATE AND ACETAMINOPHEN 10; 325 MG/1; MG/1
1 TABLET ORAL EVERY 12 HOURS PRN
Qty: 60 TABLET | Refills: 0 | Status: SHIPPED | OUTPATIENT
Start: 2024-01-06

## 2023-12-01 RX ORDER — HYDROCODONE BITARTRATE AND ACETAMINOPHEN 10; 325 MG/1; MG/1
1 TABLET ORAL EVERY 12 HOURS PRN
Qty: 60 TABLET | Refills: 0 | Status: SHIPPED | OUTPATIENT
Start: 2023-12-08

## 2023-12-01 RX ORDER — HYDROCODONE BITARTRATE AND ACETAMINOPHEN 10; 325 MG/1; MG/1
1 TABLET ORAL 2 TIMES DAILY PRN
Qty: 60 TABLET | Refills: 0 | Status: SHIPPED | OUTPATIENT
Start: 2024-02-05

## 2023-12-01 RX ORDER — TRAZODONE HYDROCHLORIDE 50 MG/1
TABLET ORAL
Qty: 60 TABLET | Refills: 1 | Status: SHIPPED | OUTPATIENT
Start: 2023-12-01

## 2023-12-01 NOTE — TELEPHONE ENCOUNTER
Spoke with patient. He does not want to try a different antidepressant right now.  He would like to try the trazodone for sleep. He is more worried about that

## 2023-12-01 NOTE — PROGRESS NOTES
CHIEF COMPLAINT  Chief Complaint   Patient presents with    Neck Pain     LD Hydrocodone @ 0700 12/1/23       Primary Care  Nichole Paul, APRN    Subjective   Chris Tinsley is a 73 y.o. male  who presents for right shoulder pain medication management.  He states that approximately 6 months ago, he sustained a fall off a ladder and subsequently fractured the head of the humerus.  He also sustained a rotator cuff injury resulting in biceps tendinopathy as well as adhesive capsulitis.  He is currently being treated conservatively with his orthopedic surgeon, but continues to require pain medication.  He is currently working with physical therapy as well as exercising several times a day at home.    Neck Pain          Location: Right shoulder  Onset: 6 months ago  Duration: Improving  Timing: Constant throughout the day  Quality: Sharp, stabbing  Severity: Today: 3       Last Week: 3       Worst: 7  Modifying Factors: The pain is worse with exercising and working with physical therapy    Physical Therapy: yes    Interval Update 12/01/2023: Relatively unchanged.  Continues with shoulder pain.  Planning shoulder surgery this month    The following portions of the patient's history were reviewed and updated as appropriate: allergies, current medications, past family history, past medical history, past social history, past surgical history and problem list.    Procedures:  4/6/2023: Cervical epidural: 0% benefit      Current Outpatient Medications:     acyclovir (ZOVIRAX) 800 MG tablet, TAKE 1 TABLET BY MOUTH 5 TIMES A DAY FOR 10 DAYS, Disp: , Rfl:     ARIPiprazole (ABILIFY) 5 MG tablet, Take 1 tablet by mouth Daily., Disp: 90 tablet, Rfl: 1    aspirin 81 MG chewable tablet, Chew 1 tablet Daily., Disp: , Rfl:     atorvastatin (LIPITOR) 40 MG tablet, TAKE 1 TABLET BY MOUTH EVERY NIGHT, Disp: 90 tablet, Rfl: 1    cholecalciferol (VITAMIN D3) 25 MCG (1000 UT) tablet, Take 1 tablet by mouth Daily., Disp: , Rfl:     [START  ON 2/5/2024] HYDROcodone-acetaminophen (NORCO)  MG per tablet, Take 1 tablet by mouth 2 (Two) Times a Day As Needed for Moderate Pain., Disp: 60 tablet, Rfl: 0    [START ON 1/6/2024] HYDROcodone-acetaminophen (NORCO)  MG per tablet, Take 1 tablet by mouth Every 12 (Twelve) Hours As Needed for Moderate Pain., Disp: 60 tablet, Rfl: 0    [START ON 12/8/2023] HYDROcodone-acetaminophen (NORCO)  MG per tablet, Take 1 tablet by mouth Every 12 (Twelve) Hours As Needed for Moderate Pain., Disp: 60 tablet, Rfl: 0    lisinopril (PRINIVIL,ZESTRIL) 10 MG tablet, TAKE 1 TABLET BY MOUTH DAILY, Disp: 90 tablet, Rfl: 1    methocarbamol (ROBAXIN) 500 MG tablet, Take 1 tablet by mouth 2 (Two) Times a Day As Needed for Muscle Spasms., Disp: 60 tablet, Rfl: 1    metoprolol tartrate (LOPRESSOR) 25 MG tablet, TAKE 1 TABLET BY MOUTH EVERY 12 HOURS, Disp: 180 tablet, Rfl: 1    Omega-3 Fatty Acids (FISH OIL) 1000 MG capsule capsule, Take  by mouth Daily With Breakfast., Disp: , Rfl:     sertraline (Zoloft) 50 MG tablet, Take 1 tablet by mouth Daily., Disp: 90 tablet, Rfl: 1    tamsulosin (FLOMAX) 0.4 MG capsule 24 hr capsule, Take 1 capsule by mouth Every Night., Disp: 90 capsule, Rfl: 1    triazolam (HALCION) 0.25 MG tablet, Take  by mouth., Disp: , Rfl:     Review of Systems   Constitutional:  Negative for fatigue.   Gastrointestinal:  Negative for constipation.   Musculoskeletal:  Positive for arthralgias, myalgias and neck pain.        Right shoulder pain       Vitals:    12/01/23 0859   BP: 117/71   Pulse: 75   Resp: 16   SpO2: 93%   PainSc:   3       Urine Drug Screen: 5/04/23  Appropriate: Yes    Objective   Physical Exam  Vitals and nursing note reviewed.   Constitutional:       General: He is not in acute distress.     Appearance: Normal appearance. He is normal weight.   Neck:      Comments: Cervical spine exam:  1.  Cervical paraspinals tender to palpation bilaterally  2.  Cervical facet loading negative  bilaterally  3.  Spurling equivocal on both the left and the right  4.  Significant tenderness in the trapezius bilaterally  Musculoskeletal:      Right shoulder: Tenderness and crepitus present. Decreased range of motion. Decreased strength.      Cervical back: Neck supple. Tenderness present.   Neurological:      Mental Status: He is alert.           Assessment & Plan   Problems Addressed this Visit    None  Visit Diagnoses       Chronic pain of both shoulders    -  Primary    Displacement of cervical intervertebral disc without myelopathy        Relevant Medications    HYDROcodone-acetaminophen (NORCO)  MG per tablet (Start on 2/5/2024)    HYDROcodone-acetaminophen (NORCO)  MG per tablet (Start on 1/6/2024)    HYDROcodone-acetaminophen (NORCO)  MG per tablet (Start on 12/8/2023)          Diagnoses         Codes Comments    Chronic pain of both shoulders    -  Primary ICD-10-CM: M25.511, G89.29, M25.512  ICD-9-CM: 719.41, 338.29     Displacement of cervical intervertebral disc without myelopathy     ICD-10-CM: M50.20  ICD-9-CM: 722.0             Plan:  Continue hydrocodone 10 mg twice daily  Can continue Robaxin as needed  UDS and inspect appropriate  --- Follow-up 3 months           INSPECT REPORT    As part of the patient's treatment plan, I may be prescribing controlled substances. The patient has been made aware of appropriate use of such medications, including potential risk of somnolence, limited ability to drive and/or work safely, and the potential for dependence or overdose. It has also bee made clear that these medications are for use by this patient only, without concomitant use of alcohol or other substances unless prescribed.     Patient has completed prescribing agreement detailing terms of continued prescribing of controlled substances, including monitoring ESTEFANIA reports, urine drug screening, and pill counts if necessary. The patient is aware that inappropriate use will results  in cessation of prescribing such medications.    INSPECT report has been reviewed and scanned into the patient's chart.    As the clinician, I personally reviewed the INSPECT from 11/29/2023.    History and physical exam exhibit continued safe and appropriate use of controlled substances.      EMR Dragon/Transcription disclaimer:   Much of this encounter note is an electronic transcription/translation of spoken language to printed text. The electronic translation of spoken language may permit erroneous, or at times, nonsensical words or phrases to be inadvertently transcribed; Although I have reviewed the note for such errors, some may still exist.

## 2023-12-11 RX ORDER — METHOCARBAMOL 500 MG/1
500 TABLET, FILM COATED ORAL 2 TIMES DAILY PRN
Qty: 60 TABLET | Refills: 1 | Status: SHIPPED | OUTPATIENT
Start: 2023-12-11

## 2024-01-12 ENCOUNTER — TELEPHONE (OUTPATIENT)
Dept: FAMILY MEDICINE CLINIC | Facility: CLINIC | Age: 74
End: 2024-01-12
Payer: OTHER GOVERNMENT

## 2024-01-12 NOTE — TELEPHONE ENCOUNTER
Hydrocortisone cream sent.  Instruct pt to also use preparation H wipes when using the bathroom, stool softeners to keep from straining to have a bowel movement and call for worsening.

## 2024-01-12 NOTE — TELEPHONE ENCOUNTER
Caller: Chris Tinsley    Relationship: Self    Best call back number: [phone]     What medication are you requesting: HYDROCORTISONE CREAM OR SOME OTHER CREAM THAT ARIEL JEFFERY WOULD ADVISE HE USE FOR HEMORRHOIDAL DISCOMFORT    HAS BEEN DOING SITZ BATHS.    What are your current symptoms: RECTAL PAIN    How long have you been experiencing symptoms: 1 WEEK    Have you had these symptoms before:    [x] Yes  [] No    Have you been treated for these symptoms before:   [x] Yes  [] No    If a prescription is needed, what is your preferred pharmacy and phone number: [id number] - [facility] [address] [facility] [address] [facility] [address] [facility] [address] - [phone]  - [phone] FX

## 2024-01-23 RX ORDER — TRAZODONE HYDROCHLORIDE 50 MG/1
TABLET ORAL
Qty: 60 TABLET | Refills: 1 | Status: SHIPPED | OUTPATIENT
Start: 2024-01-23

## 2024-02-05 ENCOUNTER — TELEPHONE (OUTPATIENT)
Dept: PHYSICAL THERAPY | Facility: CLINIC | Age: 74
End: 2024-02-05
Payer: OTHER GOVERNMENT

## 2024-02-05 NOTE — TELEPHONE ENCOUNTER
Caller: EDYTA LAURA    Relationship: Emergency Contact    Best call back number: [phone]       What was the call regarding: PLEASE CALL TO SCHEDULE POST OP WOULD LIKE MOHSEN, SEE NOTE IN REFERRAL

## 2024-02-14 ENCOUNTER — TREATMENT (OUTPATIENT)
Dept: PHYSICAL THERAPY | Facility: CLINIC | Age: 74
End: 2024-02-14
Payer: MEDICARE

## 2024-02-14 DIAGNOSIS — M25.511 CHRONIC RIGHT SHOULDER PAIN: Primary | ICD-10-CM

## 2024-02-14 DIAGNOSIS — G89.29 CHRONIC RIGHT SHOULDER PAIN: Primary | ICD-10-CM

## 2024-02-14 PROCEDURE — 97161 PT EVAL LOW COMPLEX 20 MIN: CPT | Performed by: PHYSICAL THERAPIST

## 2024-02-14 PROCEDURE — 97140 MANUAL THERAPY 1/> REGIONS: CPT | Performed by: PHYSICAL THERAPIST

## 2024-02-14 PROCEDURE — 97110 THERAPEUTIC EXERCISES: CPT | Performed by: PHYSICAL THERAPIST

## 2024-02-14 PROCEDURE — G0283 ELEC STIM OTHER THAN WOUND: HCPCS | Performed by: PHYSICAL THERAPIST

## 2024-02-16 ENCOUNTER — TREATMENT (OUTPATIENT)
Dept: PHYSICAL THERAPY | Facility: CLINIC | Age: 74
End: 2024-02-16
Payer: MEDICARE

## 2024-02-16 DIAGNOSIS — G89.29 CHRONIC RIGHT SHOULDER PAIN: Primary | ICD-10-CM

## 2024-02-16 DIAGNOSIS — M25.511 CHRONIC RIGHT SHOULDER PAIN: Primary | ICD-10-CM

## 2024-02-16 PROCEDURE — 97140 MANUAL THERAPY 1/> REGIONS: CPT | Performed by: PHYSICAL THERAPIST

## 2024-02-16 PROCEDURE — 97110 THERAPEUTIC EXERCISES: CPT | Performed by: PHYSICAL THERAPIST

## 2024-02-16 PROCEDURE — 97530 THERAPEUTIC ACTIVITIES: CPT | Performed by: PHYSICAL THERAPIST

## 2024-02-19 ENCOUNTER — TREATMENT (OUTPATIENT)
Dept: PHYSICAL THERAPY | Facility: CLINIC | Age: 74
End: 2024-02-19
Payer: MEDICARE

## 2024-02-19 DIAGNOSIS — G89.29 CHRONIC RIGHT SHOULDER PAIN: Primary | ICD-10-CM

## 2024-02-19 DIAGNOSIS — M25.511 CHRONIC RIGHT SHOULDER PAIN: Primary | ICD-10-CM

## 2024-02-19 PROCEDURE — 97140 MANUAL THERAPY 1/> REGIONS: CPT | Performed by: PHYSICAL THERAPIST

## 2024-02-19 PROCEDURE — 97530 THERAPEUTIC ACTIVITIES: CPT | Performed by: PHYSICAL THERAPIST

## 2024-02-19 PROCEDURE — 97110 THERAPEUTIC EXERCISES: CPT | Performed by: PHYSICAL THERAPIST

## 2024-02-19 NOTE — PROGRESS NOTES
Physical Therapy Daily Treatment Note  Visit: 3    Chris Tinsley reports: tried sidelying exercises but limited per neck pain.  Does feel R shoulder ROM is improving some with PT tx and HEP.   YOB: 1950  Referring practitioner : Bryce Hill  Date of Initial: Type: THERAPY  Noted: 2/14/2024  Today's date: 2/19/2024  Patient seen for 3 sessions    Visit Diagnoses:    ICD-10-CM ICD-9-CM   1. Chronic right shoulder pain  M25.511 719.41    G89.29 338.29       Subjective       Objective   See Exercise, Manual, and Modality Logs for complete treatment.       Assessment/Plan    Limited but improved passive and active assisted R shoulder ROM and pain.  Upper thoracic ROM improved with seated reaching/cervical rotation and scapular mobilization.     Plan:    Continue             Timed:         Manual Therapy:    20     mins  25881;     Therapeutic Exercise:    12     mins  72807;     Therapeutic Activity:     10     mins  18223;             Timed Treatment:   42   mins   Total Treatment:     42   mins         Baljinder Hernandez PT, DPT  Physical Therapist  [place] Lic #[id number]

## 2024-02-20 RX ORDER — LISINOPRIL 10 MG/1
10 TABLET ORAL DAILY
Qty: 90 TABLET | Refills: 1 | Status: SHIPPED | OUTPATIENT
Start: 2024-02-20

## 2024-02-21 ENCOUNTER — TREATMENT (OUTPATIENT)
Dept: PHYSICAL THERAPY | Facility: CLINIC | Age: 74
End: 2024-02-21
Payer: MEDICARE

## 2024-02-21 DIAGNOSIS — G89.29 CHRONIC RIGHT SHOULDER PAIN: Primary | ICD-10-CM

## 2024-02-21 DIAGNOSIS — M25.511 CHRONIC RIGHT SHOULDER PAIN: Primary | ICD-10-CM

## 2024-02-21 PROCEDURE — 97530 THERAPEUTIC ACTIVITIES: CPT | Performed by: PHYSICAL THERAPIST

## 2024-02-21 PROCEDURE — 97110 THERAPEUTIC EXERCISES: CPT | Performed by: PHYSICAL THERAPIST

## 2024-02-21 PROCEDURE — 97140 MANUAL THERAPY 1/> REGIONS: CPT | Performed by: PHYSICAL THERAPIST

## 2024-02-21 NOTE — PROGRESS NOTES
Physical Therapy Daily Treatment Note  Visit: 4    Chris Tinsley reports: has been trying to reach/use his right arm more at home in addition to stretching exercises.   Reports still painful but not worse even increased ROM and use of his R arm.   YOB: 1950  Referring practitioner : Bryce Hill  Date of Initial: Type: THERAPY  Noted: 2024  Today's date: 2024  Patient seen for 4 sessions    Visit Diagnoses:    ICD-10-CM ICD-9-CM   1. Chronic right shoulder pain  M25.511 719.41    G89.29 338.29       Subjective       Objective   ST. R shoulder pain with ADL's decreased 20% or greater over 3 weeks. - Progressed towards   2. R shoulder PROM improved to 100 degrees elevation in scapular plane over 3 weeks. - Progressed towards  3. AROM R shoulder elevation w/o increased pain or compensation to 70 degrees FL or greater. - Progressed towards  4. Independent and compliant with HEP over 3 weeks. - Progressed towards      See Exercise, Manual, and Modality Logs for complete treatment.       Assessment/Plan    Moderate improvement especially R shoulder elevation both actively/passively and small improvement in ER. Decreased guarding during both passive and active assisted ROM.     Plan:    Continue            Timed:         Manual Therapy:    16     mins  72595;     Therapeutic Exercise:    14     mins  15133;     Therapeutic Activity:     10     mins  77133;           Timed Treatment:   40   mins   Total Treatment:     40   mins         Baljinder Hernandez PT, DPT  Physical Therapist  [place] Lic #[id number]

## 2024-02-26 ENCOUNTER — TREATMENT (OUTPATIENT)
Dept: PHYSICAL THERAPY | Facility: CLINIC | Age: 74
End: 2024-02-26
Payer: MEDICARE

## 2024-02-26 DIAGNOSIS — G89.29 CHRONIC RIGHT SHOULDER PAIN: Primary | ICD-10-CM

## 2024-02-26 DIAGNOSIS — M25.511 CHRONIC RIGHT SHOULDER PAIN: Primary | ICD-10-CM

## 2024-02-26 PROCEDURE — 97110 THERAPEUTIC EXERCISES: CPT | Performed by: PHYSICAL THERAPIST

## 2024-02-26 PROCEDURE — 97530 THERAPEUTIC ACTIVITIES: CPT | Performed by: PHYSICAL THERAPIST

## 2024-02-26 PROCEDURE — 97140 MANUAL THERAPY 1/> REGIONS: CPT | Performed by: PHYSICAL THERAPIST

## 2024-02-26 NOTE — PROGRESS NOTES
Physical Therapy Daily Treatment Note  Visit: 5    Chris Tinsley reports: still painful especially on the backside of his right shoulder along with neck pain.   YOB: 1950  Referring practitioner : Bryec Hill  Date of Initial: Type: THERAPY  Noted: 2/14/2024  Today's date: 2/26/2024  Patient seen for 5 sessions    Visit Diagnoses:    ICD-10-CM ICD-9-CM   1. Chronic right shoulder pain  M25.511 719.41    G89.29 338.29       Subjective       Objective   See Exercise, Manual, and Modality Logs for complete treatment.       Assessment/Plan    More limited AROM/PROM initially but improved to prior level end of session in R shoulder.  Demonstrating improved trunk rotation and cervical AROM in addition to R shoulder ROM.     Plan:    Continue current           Timed:         Manual Therapy:    21     mins  78755;     Therapeutic Exercise:    10     mins  62868;     Therapeutic Activity:     9     mins  06378;            Timed Treatment:   40   mins   Total Treatment:     40   mins         Baljinder Hernandez PT, DPT  Physical Therapist  [place] Lic #[id number]

## 2024-02-27 NOTE — PROGRESS NOTES
[facility]   [address], [address]  [place] [address]  [phone]  Merna Layton, MSN, APRN, PMHNP-BC        Subjective     Chris Tinsley is a 73 y.o. male who presents today for follow up for psychiatric medication management.     Chief Complaint:  Depression, anxiety     History of Present Illness:     Medication adjustments last visit:   Continue sertraline 50g daily. Called and stated he stopped taking this as it was making him sluggish.   Start aripiprazole 5mg daily.   Started trazodone     He had shoulder surgery on December 18th. He had a shoulder replacement. He is still recovering from this, still in a sling  He is not taking sertraline or abilify. He is only taking his sleeping medication.   He is having prostate surgery next week. He is getting up every half hour at night to urinate and not getting any sleep.   They would like to hold off on mood medications until after he has recovered from these surgeries, to see where he's at.   Denies any suicidal ideation.     Previous visit:   At today's visit, he is accompanied to the appointment with his wife. He states she has recently lost her job, and as a result, he has lost his insurance. [facility] a [facility].   He is having a lot of depression since his wife lost his job.   He is having lots of irritability.   No suicidal thoughts. No HI/AVH.   We discussed adding abilify for irritability which he was agreeable to.   We also discussed adding hydroxyzine as needed for anxiety. He wanted to hold off on that medication for now, but will call and let me know if he wants me to send it in.   No suicidal thoughts. No HI/AVH.     Patient presents with symptoms and behaviors that are consistent with the following DSM-5 diagnoses:  Major depressive disorder  2.  Generalized anxiety disorder  3. Insomnia     The following portions of the patient's history were  reviewed and updated as appropriate: allergies, current medications, past family history, past medical history, past social history, past surgical history and problem list.    PAST OUTPATIENT TREATMENT  Diagnosis treated:  Affective Disorder, Anxiety/Panic Disorder  Treatment Type:  Medication Management  Prior Psychiatric Medications:  He tried one medication in the past but doesn't remember what it is.   Support Groups:  None  Sequelae Of Mental Disorder:  emotional distress    Interval History  New pt    Side Effects  None      Past Medical History:  Past Medical History:   Diagnosis Date    Abnormal nuclear stress test 06/22/2019    Chronic deep vein thrombosis (DVT) of left lower extremity     BHATTI (nonalcoholic steatohepatitis) 06/2019    Unstable angina 06/22/2019    Vertigo        Social History:  Social History     Socioeconomic History    Marital status:    Tobacco Use    Smoking status: Never     Passive exposure: Never    Smokeless tobacco: Never   Vaping Use    Vaping Use: Never used   Substance and Sexual Activity    Alcohol use: Not Currently     Comment: quit 20 yrs ago    Drug use: No    Sexual activity: Defer       Family History:  Family History   Problem Relation Age of Onset    Heart disease Father        Past Surgical History:  Past Surgical History:   Procedure Laterality Date    CARDIAC CATHETERIZATION N/A 6/22/2019    Procedure: LEFT HEART CATH with possible PCI;  Surgeon: Enmanuel Wild MD;  Location: [facility] CATH INVASIVE LOCATION;  Service: Cardiovascular    COLON SURGERY      CORONARY ARTERY BYPASS GRAFT N/A 6/24/2019    Procedure: CABG;  Surgeon: Jose Angel López MD;  Location: [facility] CVOR;  Service: Cardiothoracic    LIVER BIOPSY  06/2019       Problem List:  Patient Active Problem List   Diagnosis    Benign essential HTN    Chronic pain    Osteoarthritis    Chronic deep vein thrombosis (DVT) of left lower extremity    Chronic insomnia    Dizziness    Shortness of  breath    Coronary artery disease involving native coronary artery with unstable angina pectoris    S/P CABG (LIMA to LAD, SVG to PDA, SVG to lateral branch of ramus, SVG to OM1 --medial branch of ramus) per Dr. López 6/24/2019    Mixed hyperlipidemia    Family history of diabetes mellitus    Generalized anxiety disorder    Post-herpetic polyneuropathy    Proteinuria    Stage 3a chronic kidney disease    BHATTI (nonalcoholic steatohepatitis)       Allergy:   Allergies   Allergen Reactions    Ibuprofen Nausea And Vomiting        Discontinued Medications:  Medications Discontinued During This Encounter   Medication Reason    ARIPiprazole (ABILIFY) 5 MG tablet     HYDROcodone-acetaminophen (NORCO)  MG per tablet     HYDROcodone-acetaminophen (NORCO)  MG per tablet     HYDROcodone-acetaminophen (NORCO)  MG per tablet     sertraline (Zoloft) 50 MG tablet     triazolam (HALCION) 0.25 MG tablet            Current Medications:   Current Outpatient Medications   Medication Sig Dispense Refill    acyclovir (ZOVIRAX) 800 MG tablet TAKE 1 TABLET BY MOUTH 5 TIMES A DAY FOR 10 DAYS      aspirin 81 MG chewable tablet Chew 1 tablet Daily.      atorvastatin (LIPITOR) 40 MG tablet TAKE 1 TABLET BY MOUTH EVERY NIGHT 90 tablet 1    cholecalciferol (VITAMIN D3) 25 MCG (1000 UT) tablet Take 1 tablet by mouth Daily.      hydrocortisone 2.5 % cream Apply 1 application  topically to the appropriate area as directed 2 (Two) Times a Day. 30 g 1    lisinopril (PRINIVIL,ZESTRIL) 10 MG tablet TAKE 1 TABLET BY MOUTH DAILY 90 tablet 1    methocarbamol (ROBAXIN) 500 MG tablet Take 1 tablet by mouth 2 (Two) Times a Day As Needed for Muscle Spasms. 60 tablet 1    metoprolol tartrate (LOPRESSOR) 25 MG tablet TAKE 1 TABLET BY MOUTH EVERY 12 HOURS 180 tablet 1    Omega-3 Fatty Acids (FISH OIL) 1000 MG capsule capsule Take  by mouth Daily With Breakfast.      oxyCODONE (OxyCONTIN) 10 MG 12 hr tablet Take 1 tablet by mouth 3 (Three) Times  a Day As Needed for Moderate Pain.      tamsulosin (FLOMAX) 0.4 MG capsule 24 hr capsule Take 1 capsule by mouth Every Night. 90 capsule 1    traZODone (DESYREL) 50 MG tablet TAKE 1 TO 2 TABLETS BY MOUTH EVERY NIGHT AS NEEDED FOR SLEEP 60 tablet 1     No current facility-administered medications for this visit.         Psychological ROS: positive for - anxiety, depression and irritability  negative for - behavioral disorder, concentration difficulties, decreased libido, disorientation, hallucinations, hostility, memory difficulties, mood swings, obsessive thoughts, physical abuse, sexual abuse, sleep disturbances or suicidal ideation      Physical Exam:   There were no vitals taken for this visit.    Mental Status Exam:   Hygiene:   good  Cooperation:  Cooperative  Eye Contact:  Good  Psychomotor Behavior:  Appropriate  Affect:  Appropriate  Mood: normal  Hopelessness: Denies  Speech:  Normal  Thought Process:  Goal directed and Linear  Thought Content:  Mood congruent  Suicidal:  None  Homicidal:  None  Hallucinations:  None  Delusion:  None  Memory:  Intact  Orientation:  Person, Place, Time and Situation  Reliability:  good  Insight:  Good  Judgement:  Good  Impulse Control:  Good  Physical/Medical Issues:  No      Mental status exam was reviewed and compared to visit on 8/15/23 and appropriate updates were made.     PHQ-9 Depression Screening    Little interest or pleasure in doing things? 2-->more than half the days   Feeling down, depressed, or hopeless? 2-->more than half the days   Trouble falling or staying asleep, or sleeping too much? 3-->nearly every day   Feeling tired or having little energy? 2-->more than half the days   Poor appetite or overeating? 0-->not at all   Feeling bad about yourself - or that you are a failure or have let yourself or your family down? 0-->not at all   Trouble concentrating on things, such as reading the newspaper or watching television? 2-->more than half the days   Moving  or speaking so slowly that other people could have noticed? Or the opposite - being so fidgety or restless that you have been moving around a lot more than usual? 2-->more than half the days   Thoughts that you would be better off dead, or of hurting yourself in some way? 0-->not at all   PHQ-9 Total Score 13   If you checked off any problems, how difficult have these problems made it for you to do your work, take care of things at home, or get along with other people? somewhat difficult        Never smoker    I advised Chris of the risks of tobacco use.     Result Review:    Labs:  No visits with results within 3 Month(s) from this visit.   Latest known visit with results is:   Clinical Support on 08/21/2023   Component Date Value Ref Range Status    PSA 08/21/2023 5.830 (H)  0.000 - 4.000 ng/mL Final       Assessment & Plan   Diagnoses and all orders for this visit:    1. Moderate episode of recurrent major depressive disorder (Primary)    2. Insomnia due to mental condition      Continue trazodone 50-100mg nightly.     Visit Diagnoses:    ICD-10-CM ICD-9-CM   1. Moderate episode of recurrent major depressive disorder  F33.1 296.32   2. Insomnia due to mental condition  F51.05 300.9     327.02           TREATMENT PLAN/GOALS: Continue supportive psychotherapy efforts and medications as indicated. Treatment and medication options discussed during today's visit. Patient ackowledged and verbally consented to continue with current treatment plan and was educated on the importance of compliance with treatment and follow-up appointments.    CRISIS RESOURCES:    In the event you have personal crisis, there are several resources to reach someone to talk with:    988 Suicide and Crisis Lifeline  Call or text 981 or chat 988ClickEquationsline.org  [facility]'s National Helpline  2-386-647-HELP (3918)  Text your zip code to 724692 (HELP4U)  's Crisis Line  Dial 988, then press 1  Text 315732    MEDICATION ISSUES:  INSPECT reviewed  as expected    Discussed medication options and treatment plan of prescribed medication as well as the risks, benefits, and side effects including potential falls, possible impaired driving and metabolic adversities among others. Patient is agreeable to call the office with any worsening of symptoms or onset of side effects. Patient is agreeable to call 911 or go to the nearest ER should he/she begin having SI/HI. No medication side effects or related complaints today.     MEDS ORDERED DURING VISIT:  No orders of the defined types were placed in this encounter.      Return in about 3 months (around 5/28/2024).         This document has been electronically signed by TORI Mckeon  February 28, 2024 10:44 EST    Part of this note may be an electronic transcription/translation of spoken language to printed text using the Dragon Dictation System.

## 2024-02-28 ENCOUNTER — OFFICE VISIT (OUTPATIENT)
Dept: PSYCHIATRY | Facility: CLINIC | Age: 74
End: 2024-02-28
Payer: MEDICARE

## 2024-02-28 DIAGNOSIS — F33.1 MODERATE EPISODE OF RECURRENT MAJOR DEPRESSIVE DISORDER: Primary | Chronic | ICD-10-CM

## 2024-02-28 DIAGNOSIS — F51.05 INSOMNIA DUE TO MENTAL CONDITION: Chronic | ICD-10-CM

## 2024-02-28 RX ORDER — OXYCODONE HCL 10 MG/1
10 TABLET, FILM COATED, EXTENDED RELEASE ORAL 3 TIMES DAILY PRN
COMMUNITY
Start: 2023-12-19

## 2024-02-29 ENCOUNTER — TREATMENT (OUTPATIENT)
Dept: PHYSICAL THERAPY | Facility: CLINIC | Age: 74
End: 2024-02-29
Payer: MEDICARE

## 2024-02-29 DIAGNOSIS — G89.29 CHRONIC RIGHT SHOULDER PAIN: Primary | ICD-10-CM

## 2024-02-29 DIAGNOSIS — M25.511 CHRONIC RIGHT SHOULDER PAIN: Primary | ICD-10-CM

## 2024-02-29 PROCEDURE — 97140 MANUAL THERAPY 1/> REGIONS: CPT | Performed by: PHYSICAL THERAPIST

## 2024-02-29 PROCEDURE — 97110 THERAPEUTIC EXERCISES: CPT | Performed by: PHYSICAL THERAPIST

## 2024-02-29 NOTE — PROGRESS NOTES
Physical Therapy Daily Treatment Note  Visit: 6    Chris Tinsley reports: no new issues today.    YOB: 1950  Referring practitioner : Bryce Hill  Date of Initial: Type: THERAPY  Noted: 2024  Today's date: 2024  Patient seen for 6 sessions    Visit Diagnoses:    ICD-10-CM ICD-9-CM   1. Chronic right shoulder pain  M25.511 719.41    G89.29 338.29       Subjective       Objective   ST. R shoulder pain with ADL's decreased 20% or greater over 3 weeks. - Progressed towards  2. R shoulder PROM improved to 100 degrees elevation in scapular plane over 3 weeks. - Progressed towards  3. AROM R shoulder elevation w/o increased pain or compensation to 70 degrees FL or greater. - Progressed towards  4. Independent and compliant with HEP over 3 weeks. - Met        See Exercise, Manual, and Modality Logs for complete treatment.       Assessment/Plan    PROM improved all planes in L shoulder with continued but decreased pain and muscle guarding during both active and passive ROM activities.     Plan:    Continue current           Timed:         Manual Therapy:    28     mins  18422;     Therapeutic Exercise:    10     mins  21696;           Timed Treatment:   38   mins   Total Treatment:     38   mins         Baljinder Hernandez PT, DPT  Physical Therapist  [place] Lic #[id number]

## 2024-03-01 ENCOUNTER — OFFICE VISIT (OUTPATIENT)
Dept: PAIN MEDICINE | Facility: CLINIC | Age: 74
End: 2024-03-01
Payer: MEDICARE

## 2024-03-01 VITALS
DIASTOLIC BLOOD PRESSURE: 79 MMHG | RESPIRATION RATE: 16 BRPM | SYSTOLIC BLOOD PRESSURE: 122 MMHG | OXYGEN SATURATION: 98 % | HEART RATE: 65 BPM

## 2024-03-01 DIAGNOSIS — M50.20 DISPLACEMENT OF CERVICAL INTERVERTEBRAL DISC WITHOUT MYELOPATHY: Primary | ICD-10-CM

## 2024-03-01 DIAGNOSIS — M25.511 CHRONIC PAIN OF BOTH SHOULDERS: ICD-10-CM

## 2024-03-01 DIAGNOSIS — M25.512 CHRONIC PAIN OF BOTH SHOULDERS: ICD-10-CM

## 2024-03-01 DIAGNOSIS — G89.29 CHRONIC PAIN OF BOTH SHOULDERS: ICD-10-CM

## 2024-03-01 PROCEDURE — 1159F MED LIST DOCD IN RCRD: CPT | Performed by: STUDENT IN AN ORGANIZED HEALTH CARE EDUCATION/TRAINING PROGRAM

## 2024-03-01 PROCEDURE — G0463 HOSPITAL OUTPT CLINIC VISIT: HCPCS | Performed by: STUDENT IN AN ORGANIZED HEALTH CARE EDUCATION/TRAINING PROGRAM

## 2024-03-01 PROCEDURE — 99214 OFFICE O/P EST MOD 30 MIN: CPT | Performed by: STUDENT IN AN ORGANIZED HEALTH CARE EDUCATION/TRAINING PROGRAM

## 2024-03-01 PROCEDURE — 1125F AMNT PAIN NOTED PAIN PRSNT: CPT | Performed by: STUDENT IN AN ORGANIZED HEALTH CARE EDUCATION/TRAINING PROGRAM

## 2024-03-01 PROCEDURE — 1160F RVW MEDS BY RX/DR IN RCRD: CPT | Performed by: STUDENT IN AN ORGANIZED HEALTH CARE EDUCATION/TRAINING PROGRAM

## 2024-03-01 PROCEDURE — 3078F DIAST BP <80 MM HG: CPT | Performed by: STUDENT IN AN ORGANIZED HEALTH CARE EDUCATION/TRAINING PROGRAM

## 2024-03-01 PROCEDURE — 3074F SYST BP LT 130 MM HG: CPT | Performed by: STUDENT IN AN ORGANIZED HEALTH CARE EDUCATION/TRAINING PROGRAM

## 2024-03-01 RX ORDER — METHOCARBAMOL 500 MG/1
500 TABLET, FILM COATED ORAL 2 TIMES DAILY PRN
Qty: 60 TABLET | Refills: 5 | Status: SHIPPED | OUTPATIENT
Start: 2024-03-01

## 2024-03-01 RX ORDER — OXYCODONE AND ACETAMINOPHEN 10; 325 MG/1; MG/1
TABLET ORAL
COMMUNITY

## 2024-03-01 NOTE — PROGRESS NOTES
CHIEF COMPLAINT  Chief Complaint   Patient presents with    Shoulder Pain    Neck Pain     LD Oxycodone @ PM 2/29/24       Primary Care  Nichole Paul, TORI    Subjective   Chris Tinsley is a 73 y.o. male  who presents for right shoulder pain medication management.  He states that approximately 6 months ago, he sustained a fall off a ladder and subsequently fractured the head of the humerus.  He also sustained a rotator cuff injury resulting in biceps tendinopathy as well as adhesive capsulitis.  He is currently being treated conservatively with his orthopedic surgeon, but continues to require pain medication.  He is currently working with physical therapy as well as exercising several times a day at home.    Neck Pain          Location: Right shoulder  Onset: 6 months ago  Duration: Improving  Timing: Constant throughout the day  Quality: Sharp, stabbing  Severity: Today: 3       Last Week: 3       Worst: 7  Modifying Factors: The pain is worse with exercising and working with physical therapy    Physical Therapy: yes    Interval Update 03/01/2024: He recently had shoulder surgery and is recovering slowly.  He is now taking narcotics from his orthopedic surgeon.    The following portions of the patient's history were reviewed and updated as appropriate: allergies, current medications, past family history, past medical history, past social history, past surgical history and problem list.    Procedures:  4/6/2023: Cervical epidural: 0% benefit      Current Outpatient Medications:     acyclovir (ZOVIRAX) 800 MG tablet, TAKE 1 TABLET BY MOUTH 5 TIMES A DAY FOR 10 DAYS, Disp: , Rfl:     aspirin 81 MG chewable tablet, Chew 1 tablet Daily., Disp: , Rfl:     atorvastatin (LIPITOR) 40 MG tablet, TAKE 1 TABLET BY MOUTH EVERY NIGHT, Disp: 90 tablet, Rfl: 1    cholecalciferol (VITAMIN D3) 25 MCG (1000 UT) tablet, Take 1 tablet by mouth Daily., Disp: , Rfl:     hydrocortisone 2.5 % cream, Apply 1 application  topically to  the appropriate area as directed 2 (Two) Times a Day., Disp: 30 g, Rfl: 1    lisinopril (PRINIVIL,ZESTRIL) 10 MG tablet, TAKE 1 TABLET BY MOUTH DAILY, Disp: 90 tablet, Rfl: 1    methocarbamol (ROBAXIN) 500 MG tablet, Take 1 tablet by mouth 2 (Two) Times a Day As Needed for Muscle Spasms., Disp: 60 tablet, Rfl: 5    metoprolol tartrate (LOPRESSOR) 25 MG tablet, TAKE 1 TABLET BY MOUTH EVERY 12 HOURS, Disp: 180 tablet, Rfl: 1    Omega-3 Fatty Acids (FISH OIL) 1000 MG capsule capsule, Take  by mouth Daily With Breakfast., Disp: , Rfl:     oxyCODONE (OxyCONTIN) 10 MG 12 hr tablet, Take 1 tablet by mouth 3 (Three) Times a Day As Needed for Moderate Pain., Disp: , Rfl:     oxyCODONE-acetaminophen (PERCOCET)  MG per tablet, take 1 tablet by mouth 3 times a day as needed for pain, Disp: , Rfl:     tamsulosin (FLOMAX) 0.4 MG capsule 24 hr capsule, Take 1 capsule by mouth Every Night., Disp: 90 capsule, Rfl: 1    traZODone (DESYREL) 50 MG tablet, TAKE 1 TO 2 TABLETS BY MOUTH EVERY NIGHT AS NEEDED FOR SLEEP, Disp: 60 tablet, Rfl: 1    Review of Systems   Constitutional:  Negative for fatigue.   Gastrointestinal:  Negative for constipation.   Musculoskeletal:  Positive for arthralgias, myalgias and neck pain.        Right shoulder pain       Vitals:    03/01/24 0822   BP: 122/79   Pulse: 65   Resp: 16   SpO2: 98%   PainSc:   4       Urine Drug Screen: 5/04/23  Appropriate: Yes    Objective   Physical Exam  Vitals and nursing note reviewed.   Constitutional:       General: He is not in acute distress.     Appearance: Normal appearance. He is normal weight.   Neck:      Comments: Cervical spine exam:  1.  Cervical paraspinals tender to palpation bilaterally  2.  Cervical facet loading negative bilaterally  3.  Spurling equivocal on both the left and the right  4.  Significant tenderness in the trapezius bilaterally  Musculoskeletal:      Right shoulder: Tenderness and crepitus present. Decreased range of motion. Decreased  strength.      Cervical back: Neck supple. Tenderness present.   Neurological:      Mental Status: He is alert.           Assessment & Plan   Problems Addressed this Visit    None  Visit Diagnoses       Displacement of cervical intervertebral disc without myelopathy    -  Primary    Chronic pain of both shoulders              Diagnoses         Codes Comments    Displacement of cervical intervertebral disc without myelopathy    -  Primary ICD-10-CM: M50.20  ICD-9-CM: 722.0     Chronic pain of both shoulders     ICD-10-CM: M25.511, G89.29, M25.512  ICD-9-CM: 719.41, 338.29             Plan:  His surgeon can continue his narcotics  Can continue Robaxin as needed  UDS and inspect appropriate  --- Follow-up as needed         INSPECT REPORT    As part of the patient's treatment plan, I may be prescribing controlled substances. The patient has been made aware of appropriate use of such medications, including potential risk of somnolence, limited ability to drive and/or work safely, and the potential for dependence or overdose. It has also bee made clear that these medications are for use by this patient only, without concomitant use of alcohol or other substances unless prescribed.     Patient has completed prescribing agreement detailing terms of continued prescribing of controlled substances, including monitoring ESTEFANIA reports, urine drug screening, and pill counts if necessary. The patient is aware that inappropriate use will results in cessation of prescribing such medications.    INSPECT report has been reviewed and scanned into the patient's chart.    As the clinician, I personally reviewed the INSPECT from 2/28/2024.    History and physical exam exhibit continued safe and appropriate use of controlled substances.      EMR Dragon/Transcription disclaimer:   Much of this encounter note is an electronic transcription/translation of spoken language to printed text. The electronic translation of spoken language may  permit erroneous, or at times, nonsensical words or phrases to be inadvertently transcribed; Although I have reviewed the note for such errors, some may still exist.

## 2024-03-04 ENCOUNTER — TREATMENT (OUTPATIENT)
Dept: PHYSICAL THERAPY | Facility: CLINIC | Age: 74
End: 2024-03-04
Payer: MEDICARE

## 2024-03-04 DIAGNOSIS — M25.511 CHRONIC RIGHT SHOULDER PAIN: Primary | ICD-10-CM

## 2024-03-04 DIAGNOSIS — G89.29 CHRONIC RIGHT SHOULDER PAIN: Primary | ICD-10-CM

## 2024-03-04 PROCEDURE — 97140 MANUAL THERAPY 1/> REGIONS: CPT | Performed by: PHYSICAL THERAPIST

## 2024-03-04 PROCEDURE — G0283 ELEC STIM OTHER THAN WOUND: HCPCS | Performed by: PHYSICAL THERAPIST

## 2024-03-04 PROCEDURE — 97110 THERAPEUTIC EXERCISES: CPT | Performed by: PHYSICAL THERAPIST

## 2024-03-04 NOTE — PROGRESS NOTES
Physical Therapy Daily Treatment Note  Visit: 7    Chris Tinsley reports: frustrated as pain still limiting his ability to move his right shoulder.  Reports some improvement in ROM with PT but plans on checking with MD if he would recommend manipulation as he had positive response to stiffness with prior shoulder injury.  YOB: 1950  Referring practitioner : Bryce Hill  Date of Initial: Type: THERAPY  Noted: 2/14/2024  Today's date: 3/4/2024  Patient seen for 7 sessions    Visit Diagnoses:    ICD-10-CM ICD-9-CM   1. Chronic right shoulder pain  M25.511 719.41    G89.29 338.29       Subjective       Objective   See Exercise, Manual, and Modality Logs for complete treatment.       Assessment/Plan    Improving but limited R GH, scapular, thoracic ROM.  Still limited by pain especially with FL/ER ROM both active and passively at endrange.       Plan:    Continue           Timed:         Manual Therapy:    20     mins  96324;     Therapeutic Exercise:    10     mins  44742;           Un-Timed:  Electrical Stimulation:    15     mins  64653 ( );      Timed Treatment:   30   mins   Total Treatment:     45   mins         Baljinder Hernandez PT, DPT  Physical Therapist  [place] Lic #[id number]

## 2024-03-05 PROCEDURE — 88305 TISSUE EXAM BY PATHOLOGIST: CPT | Performed by: UROLOGY

## 2024-03-06 ENCOUNTER — LAB REQUISITION (OUTPATIENT)
Dept: LAB | Facility: HOSPITAL | Age: 74
End: 2024-03-06
Payer: MEDICARE

## 2024-03-06 DIAGNOSIS — N40.1 BENIGN PROSTATIC HYPERPLASIA WITH LOWER URINARY TRACT SYMPTOMS: ICD-10-CM

## 2024-03-07 LAB
LAB AP CASE REPORT: NORMAL
PATH REPORT.FINAL DX SPEC: NORMAL
PATH REPORT.GROSS SPEC: NORMAL

## 2024-03-19 RX ORDER — TRAZODONE HYDROCHLORIDE 50 MG/1
TABLET ORAL
Qty: 180 TABLET | Refills: 1 | Status: SHIPPED | OUTPATIENT
Start: 2024-03-19

## 2024-03-21 ENCOUNTER — TREATMENT (OUTPATIENT)
Dept: PHYSICAL THERAPY | Facility: CLINIC | Age: 74
End: 2024-03-21
Payer: MEDICARE

## 2024-03-21 DIAGNOSIS — G89.29 CHRONIC RIGHT SHOULDER PAIN: Primary | ICD-10-CM

## 2024-03-21 DIAGNOSIS — M25.511 CHRONIC RIGHT SHOULDER PAIN: Primary | ICD-10-CM

## 2024-03-21 PROCEDURE — 97140 MANUAL THERAPY 1/> REGIONS: CPT | Performed by: PHYSICAL THERAPIST

## 2024-03-21 PROCEDURE — 97110 THERAPEUTIC EXERCISES: CPT | Performed by: PHYSICAL THERAPIST

## 2024-03-21 NOTE — PROGRESS NOTES
Physical Therapy Daily Treatment Note  Visit: 8    Chris Tinsley reports: had manipulation on R shoulder this AM.  Reports MD was pleased with progress in ROM with audible released of shoulder at endrange.   YOB: 1950  Referring practitioner : Bryce Hill  Date of Initial: Type: THERAPY  Noted: 2/14/2024  Today's date: 3/21/2024  Patient seen for 8 sessions    Visit Diagnoses:    ICD-10-CM ICD-9-CM   1. Chronic right shoulder pain  M25.511 719.41    G89.29 338.29       Subjective       Objective     PROM R shoulder FL 95 degrees, ER 30 degrees  See Exercise, Manual, and Modality Logs for complete treatment.       Assessment/Plan    Improved PROM/AAROM R shoulder status post manipulation.  Less pain and guarding with ROM activity and good understanding of HEP.  Follow up PT visit tomorrow.            Timed:         Manual Therapy:    20     mins  21734;     Therapeutic Exercise:    10     mins  95853;           Timed Treatment:   30   mins   Total Treatment:     30   mins         Baljinder Hernandez PT, DPT  Physical Therapist  [place] Lic #[id number]

## 2024-03-22 ENCOUNTER — TREATMENT (OUTPATIENT)
Dept: PHYSICAL THERAPY | Facility: CLINIC | Age: 74
End: 2024-03-22
Payer: MEDICARE

## 2024-03-22 DIAGNOSIS — G89.29 CHRONIC RIGHT SHOULDER PAIN: Primary | ICD-10-CM

## 2024-03-22 DIAGNOSIS — M25.511 CHRONIC RIGHT SHOULDER PAIN: Primary | ICD-10-CM

## 2024-03-22 PROCEDURE — 97140 MANUAL THERAPY 1/> REGIONS: CPT | Performed by: PHYSICAL THERAPIST

## 2024-03-22 PROCEDURE — 97110 THERAPEUTIC EXERCISES: CPT | Performed by: PHYSICAL THERAPIST

## 2024-03-22 NOTE — PROGRESS NOTES
Physical Therapy Daily Progress Note      Patient: Chris Tinsley   : 1950  Diagnosis/ICD-10 Code:  Chronic right shoulder pain [M25.511, G89.29]  Referring practitioner: Bryce Hill  Date of Initial Visit: Type: THERAPY  Noted: 2024  Today's Date: 3/22/2024  Patient seen for 9 sessions             Subjective Pt did not get any sleep last night because of so much shoulder pain.    Objective   See Exercise, Manual, and Modality Logs for complete treatment.       Assessment/Plan  Pt had moderate/severe guarding with PROM today, having a lot of pain with ROM and even mod/severe TTP to anterior deltoid.  Difficult to progress pt's ROM as a result.  Pt educated on not keeping his right UE against his body when he stands, to try and let it relax and naturally hang.    Progress per Plan of Care           Timed:         Manual Therapy:    20     mins  57140;     Therapeutic Exercise:    10     mins  72720;           Timed Treatment:   30   mins   Total Treatment:     30   mins        Irving Gandhi PTA  Physical Therapist Assistant

## 2024-03-25 ENCOUNTER — TREATMENT (OUTPATIENT)
Dept: PHYSICAL THERAPY | Facility: CLINIC | Age: 74
End: 2024-03-25
Payer: MEDICARE

## 2024-03-25 DIAGNOSIS — G89.29 CHRONIC RIGHT SHOULDER PAIN: Primary | ICD-10-CM

## 2024-03-25 DIAGNOSIS — M25.511 CHRONIC RIGHT SHOULDER PAIN: Primary | ICD-10-CM

## 2024-03-25 PROCEDURE — 97140 MANUAL THERAPY 1/> REGIONS: CPT | Performed by: PHYSICAL THERAPIST

## 2024-03-25 PROCEDURE — 97110 THERAPEUTIC EXERCISES: CPT | Performed by: PHYSICAL THERAPIST

## 2024-03-25 NOTE — PROGRESS NOTES
"Physical Therapy Daily Treatment Note  Visit: 10    Chris Tinsley reports: his right shoulder was sore with last treatment but felt like \"what it needed.\"  His wife reports they are still not getting enough sleep per the need for him to get up so frequently.   YOB: 1950  Referring practitioner : Bryce Hill  Date of Initial: Type: THERAPY  Noted: 2/14/2024  Today's date: 3/25/2024  Patient seen for 10 sessions    Visit Diagnoses:    ICD-10-CM ICD-9-CM   1. Chronic right shoulder pain  M25.511 719.41    G89.29 338.29       Subjective       Objective     AAROM  FL 95, ER 20 degrees end of session R shoulder  See Exercise, Manual, and Modality Logs for complete treatment.       Assessment/Plan    Limited but improving R shoulder ROM.  Educated on likely need for more frequent HEP for ROM.             Timed:         Manual Therapy:    25     mins  85994;     Therapeutic Exercise:    18     mins  42438;           Timed Treatment:   43   mins   Total Treatment:     43   mins         Baljinder Hernandez PT, DPT  Physical Therapist  [place] Lic #[id number]  "

## 2024-03-26 ENCOUNTER — TREATMENT (OUTPATIENT)
Dept: PHYSICAL THERAPY | Facility: CLINIC | Age: 74
End: 2024-03-26
Payer: MEDICARE

## 2024-03-26 DIAGNOSIS — G89.29 CHRONIC RIGHT SHOULDER PAIN: Primary | ICD-10-CM

## 2024-03-26 DIAGNOSIS — M25.511 CHRONIC RIGHT SHOULDER PAIN: Primary | ICD-10-CM

## 2024-03-26 PROCEDURE — 97140 MANUAL THERAPY 1/> REGIONS: CPT | Performed by: PHYSICAL THERAPIST

## 2024-03-26 PROCEDURE — 97110 THERAPEUTIC EXERCISES: CPT | Performed by: PHYSICAL THERAPIST

## 2024-03-26 NOTE — PROGRESS NOTES
Physical Therapy Daily Treatment Note  Visit: 11    Chris Tinsley reports: did HEP more frequently yesterday and feels was helpful.   YOB: 1950  Referring practitioner : Bryce Hill  Date of Initial: Type: THERAPY  Noted: 2/14/2024  Today's date: 3/26/2024  Patient seen for 11 sessions    Visit Diagnoses:    ICD-10-CM ICD-9-CM   1. Chronic right shoulder pain  M25.511 719.41    G89.29 338.29       Subjective       Objective   See Exercise, Manual, and Modality Logs for complete treatment.       Assessment/Plan    Best PROM and AAROM R shoulder ROM this episode. Increased frequency with HEP helpful in addition to in clinic treatment.     Plan:    Continue             Timed:         Manual Therapy:    25     mins  68770;     Therapeutic Exercise:    15     mins  07584;           Timed Treatment:   40   mins   Total Treatment:     40   mins         Baljinder Hernandez PT, DPT  Physical Therapist  [place] Lic #[id number]

## 2024-03-27 ENCOUNTER — TELEPHONE (OUTPATIENT)
Dept: FAMILY MEDICINE CLINIC | Facility: CLINIC | Age: 74
End: 2024-03-27
Payer: OTHER GOVERNMENT

## 2024-03-27 ENCOUNTER — TREATMENT (OUTPATIENT)
Dept: PHYSICAL THERAPY | Facility: CLINIC | Age: 74
End: 2024-03-27
Payer: MEDICARE

## 2024-03-27 DIAGNOSIS — M25.511 CHRONIC RIGHT SHOULDER PAIN: Primary | ICD-10-CM

## 2024-03-27 DIAGNOSIS — G89.29 CHRONIC RIGHT SHOULDER PAIN: Primary | ICD-10-CM

## 2024-03-27 PROCEDURE — 97110 THERAPEUTIC EXERCISES: CPT | Performed by: PHYSICAL THERAPIST

## 2024-03-27 PROCEDURE — 97140 MANUAL THERAPY 1/> REGIONS: CPT | Performed by: PHYSICAL THERAPIST

## 2024-03-27 NOTE — PROGRESS NOTES
Physical Therapy Daily Treatment Note  Visit: 12    Chris Tinsley reports: no new issues today.   YOB: 1950  Referring practitioner : Bryce Hill  Date of Initial: Type: THERAPY  Noted: 2/14/2024  Today's date: 3/27/2024  Patient seen for 12 sessions    Visit Diagnoses:    ICD-10-CM ICD-9-CM   1. Chronic right shoulder pain  M25.511 719.41    G89.29 338.29       Subjective       Objective   R shoulder ER 30 degrees end of tx, 100 FL PROM  See Exercise, Manual, and Modality Logs for complete treatment.       Assessment/Plan    Improving passive and active R shoulder ROM.  Still limited by pain and requires moderate cueing for exercise technique each visit.     Plan:    Continue                Timed:         Manual Therapy:    25     mins  15161;     Therapeutic Exercise:    15     mins  98343;         Timed Treatment:   40   mins   Total Treatment:     40   mins         Baljinder Hernandez PT, DPT  Physical Therapist  [place] Lic #[id number]

## 2024-03-27 NOTE — TELEPHONE ENCOUNTER
----- Message from TORI Gasca sent at 3/26/2024  2:57 PM EDT -----  I have not seen the patient in the office since last year and do not feel comfortable writing that at this time.  As next of kin, it should not be a problem for her though.  ----- Message -----  From: Aislinn Henao RegSched Rep  Sent: 3/26/2024   2:01 PM EDT  To: TORI Gasca    PATIENTS WIFE STOPPED BY AND REQUESTED A LETTER BE DONE JUST IN CASE.

## 2024-03-28 ENCOUNTER — TREATMENT (OUTPATIENT)
Dept: PHYSICAL THERAPY | Facility: CLINIC | Age: 74
End: 2024-03-28
Payer: MEDICARE

## 2024-03-28 DIAGNOSIS — G89.29 CHRONIC RIGHT SHOULDER PAIN: Primary | ICD-10-CM

## 2024-03-28 DIAGNOSIS — M25.511 CHRONIC RIGHT SHOULDER PAIN: Primary | ICD-10-CM

## 2024-03-28 PROCEDURE — 97110 THERAPEUTIC EXERCISES: CPT | Performed by: PHYSICAL THERAPIST

## 2024-03-28 PROCEDURE — 97140 MANUAL THERAPY 1/> REGIONS: CPT | Performed by: PHYSICAL THERAPIST

## 2024-03-29 ENCOUNTER — TREATMENT (OUTPATIENT)
Dept: PHYSICAL THERAPY | Facility: CLINIC | Age: 74
End: 2024-03-29
Payer: MEDICARE

## 2024-03-29 DIAGNOSIS — M25.511 CHRONIC RIGHT SHOULDER PAIN: Primary | ICD-10-CM

## 2024-03-29 DIAGNOSIS — G89.29 CHRONIC RIGHT SHOULDER PAIN: Primary | ICD-10-CM

## 2024-03-29 PROCEDURE — 97110 THERAPEUTIC EXERCISES: CPT | Performed by: PHYSICAL THERAPIST

## 2024-03-29 PROCEDURE — 97140 MANUAL THERAPY 1/> REGIONS: CPT | Performed by: PHYSICAL THERAPIST

## 2024-03-29 NOTE — PROGRESS NOTES
Physical Therapy Daily Treatment Note  Visit: 14    Chris Tinsley reports: no new issues today.   YOB: 1950  Referring practitioner : Bryce Hill  Date of Initial: Type: THERAPY  Noted: 2/14/2024  Today's date: 3/29/2024  Patient seen for 14 sessions    Visit Diagnoses:    ICD-10-CM ICD-9-CM   1. Chronic right shoulder pain  M25.511 719.41    G89.29 338.29       Subjective       Objective   See Exercise, Manual, and Modality Logs for complete treatment.       Assessment/Plan    More limited PROM R shoulder initially this visit but improved to prior level scaption and past prior level with IR.  Greater ability for full elbow EXT with reaching and decreased scapular substitution with endrange FL AROM.     Plan:    Continue            Timed:         Manual Therapy:    20     mins  21870;     Therapeutic Exercise:    10     mins  58960;         Timed Treatment:   30   mins   Total Treatment:     30   mins         Baljinder Hernandez PT, DPT  Physical Therapist  [place] Lic #[id number]

## 2024-03-29 NOTE — PROGRESS NOTES
Physical Therapy Daily Treatment Note  Visit: 13    Chris Tinsley reports: no new issues today.   YOB: 1950  Referring practitioner : Bryce Hill  Date of Initial: Type: THERAPY  Noted: 2/14/2024  Today's date: 3/29/2024  Patient seen for 13 sessions    Visit Diagnoses:    ICD-10-CM ICD-9-CM   1. Chronic right shoulder pain  M25.511 719.41    G89.29 338.29       Subjective       Objective   See Exercise, Manual, and Modality Logs for complete treatment.       Assessment/Plan    Improving R shoulder active/passive ROM.  Encouraged for more frequent HEP as he guards less with AAROM/AROM than PROM.     Plan:    Continue           Timed:         Manual Therapy:    20     mins  27909;     Therapeutic Exercise:    10     mins  36535;           Timed Treatment:   30   mins   Total Treatment:     30   mins         Baljinder Hernandez PT, DPT  Physical Therapist  [place] Lic #[id number]

## 2024-04-01 ENCOUNTER — TREATMENT (OUTPATIENT)
Dept: PHYSICAL THERAPY | Facility: CLINIC | Age: 74
End: 2024-04-01
Payer: MEDICARE

## 2024-04-01 DIAGNOSIS — M25.511 CHRONIC RIGHT SHOULDER PAIN: Primary | ICD-10-CM

## 2024-04-01 DIAGNOSIS — G89.29 CHRONIC RIGHT SHOULDER PAIN: Primary | ICD-10-CM

## 2024-04-01 PROCEDURE — 97110 THERAPEUTIC EXERCISES: CPT | Performed by: PHYSICAL THERAPIST

## 2024-04-01 PROCEDURE — 97140 MANUAL THERAPY 1/> REGIONS: CPT | Performed by: PHYSICAL THERAPIST

## 2024-04-01 NOTE — PROGRESS NOTES
Physical Therapy Daily Treatment Note  Visit: 15    Chris Tinsley reports: has been doing more HEP to improve R shoulder motion.   YOB: 1950  Referring practitioner : Bryce Hill  Date of Initial: Type: THERAPY  Noted: 2/14/2024  Today's date: 4/1/2024  Patient seen for 15 sessions    Visit Diagnoses:    ICD-10-CM ICD-9-CM   1. Chronic right shoulder pain  M25.511 719.41    G89.29 338.29       Subjective       Objective   See Exercise, Manual, and Modality Logs for complete treatment.       Assessment/Plan    Progressed standing AROM and AAROM with use of resistance pulley and Nu-step for UE motion.  PROM more limited but improved to and slightly past prior level of R shoulder ER/elevation.     Plan:    Continue            Timed:         Manual Therapy:    15     mins  02961;     Therapeutic Exercise:    24     mins  07939;           Timed Treatment:   39   mins   Total Treatment:     39   mins         Baljinder Hernandez PT, DPT  Physical Therapist  [place] Lic #[id number]

## 2024-04-03 ENCOUNTER — TREATMENT (OUTPATIENT)
Dept: PHYSICAL THERAPY | Facility: CLINIC | Age: 74
End: 2024-04-03
Payer: MEDICARE

## 2024-04-03 DIAGNOSIS — M25.511 CHRONIC RIGHT SHOULDER PAIN: Primary | ICD-10-CM

## 2024-04-03 DIAGNOSIS — G89.29 CHRONIC RIGHT SHOULDER PAIN: Primary | ICD-10-CM

## 2024-04-03 PROCEDURE — 97140 MANUAL THERAPY 1/> REGIONS: CPT | Performed by: PHYSICAL THERAPIST

## 2024-04-03 PROCEDURE — 97530 THERAPEUTIC ACTIVITIES: CPT | Performed by: PHYSICAL THERAPIST

## 2024-04-03 PROCEDURE — 97110 THERAPEUTIC EXERCISES: CPT | Performed by: PHYSICAL THERAPIST

## 2024-04-03 NOTE — PROGRESS NOTES
Physical Therapy Daily Treatment Note  Visit: 16    Chris Tinsley reports: has been doing HEP more frequently and can tell improvement.  Reports would like more activities to focus on gaining strength in his right arm as well.   YOB: 1950  Referring practitioner : Bryce Hill  Date of Initial: Type: THERAPY  Noted: 2/14/2024  Today's date: 4/3/2024  Patient seen for 16 sessions    Visit Diagnoses:    ICD-10-CM ICD-9-CM   1. Chronic right shoulder pain  M25.511 719.41    G89.29 338.29       Subjective       Objective   See Exercise, Manual, and Modality Logs for complete treatment.       Assessment/Plan    PROM improved in elevation to highest ROM post manipulation.  Educated and instructed in strengthening in scaption/flexion for R UE with handweight and modificactions for using UE resistance for HEP.     Plan:    Continue current           Timed:         Manual Therapy:    18     mins  56068;     Therapeutic Exercise:    13     mins  44902;     Therapeutic Activity:     15     mins  29479;         Timed Treatment:   46   mins   Total Treatment:     46   mins         Baljinder Hernandez PT, DPT  Physical Therapist  [place] Lic #[id number]

## 2024-04-05 ENCOUNTER — TREATMENT (OUTPATIENT)
Dept: PHYSICAL THERAPY | Facility: CLINIC | Age: 74
End: 2024-04-05
Payer: MEDICARE

## 2024-04-05 DIAGNOSIS — G89.29 CHRONIC RIGHT SHOULDER PAIN: Primary | ICD-10-CM

## 2024-04-05 DIAGNOSIS — M25.511 CHRONIC RIGHT SHOULDER PAIN: Primary | ICD-10-CM

## 2024-04-05 PROCEDURE — 97110 THERAPEUTIC EXERCISES: CPT | Performed by: PHYSICAL THERAPIST

## 2024-04-05 PROCEDURE — 97140 MANUAL THERAPY 1/> REGIONS: CPT | Performed by: PHYSICAL THERAPIST

## 2024-04-05 NOTE — PROGRESS NOTES
Physical Therapy Daily Progress Note      Patient: Chris Tinsley   : 1950  Diagnosis/ICD-10 Code:  Chronic right shoulder pain [M25.511, G89.29]  Referring practitioner: Bryce Hill  Date of Initial Visit: Type: THERAPY  Noted: 2024  Today's Date: 2024  Patient seen for 17 sessions             Subjective Pt thinks he took too much pain meds yesterday and became sick.    Objective   See Exercise, Manual, and Modality Logs for complete treatment.       Assessment/Plan  Significant functional ROM and strength deficits still persist.  Progress both as tolerated.    Progress per Plan of Care           Timed:         Manual Therapy:    15     mins  28186;     Therapeutic Exercise:    15     mins  99304;         Timed Treatment:   30   mins   Total Treatment:     30   mins        Irving Gandhi PTA  Physical Therapist Assistant

## 2024-04-08 ENCOUNTER — TREATMENT (OUTPATIENT)
Dept: PHYSICAL THERAPY | Facility: CLINIC | Age: 74
End: 2024-04-08
Payer: MEDICARE

## 2024-04-08 DIAGNOSIS — M25.511 CHRONIC RIGHT SHOULDER PAIN: Primary | ICD-10-CM

## 2024-04-08 DIAGNOSIS — G89.29 CHRONIC RIGHT SHOULDER PAIN: Primary | ICD-10-CM

## 2024-04-08 PROCEDURE — 97140 MANUAL THERAPY 1/> REGIONS: CPT | Performed by: PHYSICAL THERAPIST

## 2024-04-08 PROCEDURE — 97110 THERAPEUTIC EXERCISES: CPT | Performed by: PHYSICAL THERAPIST

## 2024-04-08 NOTE — PROGRESS NOTES
Physical Therapy Daily Progress Note      Patient: Chris Tinsley   : 1950  Diagnosis/ICD-10 Code:  Chronic right shoulder pain [M25.511, G89.29]  Referring practitioner: Bryce Hill  Date of Initial Visit: Type: THERAPY  Noted: 2024  Today's Date: 2024  Patient seen for 18 sessions             Subjective The pain in his shoulder, and especially in his right pec insertion, seems to be keeping him from getting better.    Objective   See Exercise, Manual, and Modality Logs for complete treatment.       Assessment/Plan  ROM is still greatly limited due to pain and joint mobility.  Ability to progress has been challenging.    Progress per Plan of Care           Timed:         Manual Therapy:    23     mins  83034;     Therapeutic Exercise:    15     mins  33044;         Timed Treatment:   38   mins   Total Treatment:     38   mins        Irving Gandhi PTA  Physical Therapist Assistant

## 2024-04-10 ENCOUNTER — TREATMENT (OUTPATIENT)
Dept: PHYSICAL THERAPY | Facility: CLINIC | Age: 74
End: 2024-04-10
Payer: MEDICARE

## 2024-04-10 DIAGNOSIS — M25.511 CHRONIC RIGHT SHOULDER PAIN: Primary | ICD-10-CM

## 2024-04-10 DIAGNOSIS — G89.29 CHRONIC RIGHT SHOULDER PAIN: Primary | ICD-10-CM

## 2024-04-10 PROCEDURE — 97530 THERAPEUTIC ACTIVITIES: CPT | Performed by: PHYSICAL THERAPIST

## 2024-04-10 PROCEDURE — 97140 MANUAL THERAPY 1/> REGIONS: CPT | Performed by: PHYSICAL THERAPIST

## 2024-04-10 PROCEDURE — 97110 THERAPEUTIC EXERCISES: CPT | Performed by: PHYSICAL THERAPIST

## 2024-04-10 NOTE — PROGRESS NOTES
Physical Therapy Daily Treatment Note  Visit: 19    Chris Tinsely reports: sore with massage technique on his right shoulder last visit.   YOB: 1950  Referring practitioner : Bryce Hill  Date of Initial: Type: THERAPY  Noted: 2/14/2024  Today's date: 4/10/2024  Patient seen for 19 sessions    Visit Diagnoses:    ICD-10-CM ICD-9-CM   1. Chronic right shoulder pain  M25.511 719.41    G89.29 338.29       Subjective       Objective     PROM R shoulder end of visit - 100 FL - ER 30   See Exercise, Manual, and Modality Logs for complete treatment.       Assessment/Plan    More limited AROM/PROM R shoulder per pain initially today.  Improved to prior level end of session.  Encouraged for continued and frequent ROM exercise.     Plan:    Continue           Timed:         Manual Therapy:    20     mins  59386;     Therapeutic Exercise:    10     mins  15397;     Therapeutic Activity:     10     mins  60518;           Timed Treatment:   40   mins   Total Treatment:     40   mins         Baljinder Hernandez PT, DPT  Physical Therapist  [place] Lic #[id number]

## 2024-04-12 ENCOUNTER — TREATMENT (OUTPATIENT)
Dept: PHYSICAL THERAPY | Facility: CLINIC | Age: 74
End: 2024-04-12
Payer: MEDICARE

## 2024-04-12 DIAGNOSIS — M25.511 CHRONIC RIGHT SHOULDER PAIN: Primary | ICD-10-CM

## 2024-04-12 DIAGNOSIS — G89.29 CHRONIC RIGHT SHOULDER PAIN: Primary | ICD-10-CM

## 2024-04-12 PROCEDURE — 97110 THERAPEUTIC EXERCISES: CPT | Performed by: PHYSICAL THERAPIST

## 2024-04-12 PROCEDURE — 97140 MANUAL THERAPY 1/> REGIONS: CPT | Performed by: PHYSICAL THERAPIST

## 2024-04-12 PROCEDURE — 97530 THERAPEUTIC ACTIVITIES: CPT | Performed by: PHYSICAL THERAPIST

## 2024-04-12 NOTE — PROGRESS NOTES
Physical Therapy Daily Treatment Note  Visit: 20    Chris Tinsley reports: would like review of HEP as he has multiple exercises from prior PT and current exercises.   YOB: 1950  Referring practitioner : Bryce Hill  Date of Initial: Type: THERAPY  Noted: 2/14/2024  Today's date: 4/12/2024  Patient seen for 20 sessions    Visit Diagnoses:    ICD-10-CM ICD-9-CM   1. Chronic right shoulder pain  M25.511 719.41    G89.29 338.29       Subjective       Objective   See Exercise, Manual, and Modality Logs for complete treatment.       Assessment/Plan    R shoulder more limited and painful initially but improved to and past prior level of ROM with elevation.  STM to scar and anterior shoulder helpful for both pain and ROM.     Plan:    Continue            Timed:         Manual Therapy:    20     mins  26316;     Therapeutic Exercise:    15     mins  15785;     Therapeutic Activity:     10     mins  09245;         Timed Treatment:   45   mins   Total Treatment:     45   mins         Baljinder Hernandez PT, DPT  Physical Therapist  [place] Lic #[id number]

## 2024-04-15 ENCOUNTER — TREATMENT (OUTPATIENT)
Dept: PHYSICAL THERAPY | Facility: CLINIC | Age: 74
End: 2024-04-15
Payer: MEDICARE

## 2024-04-15 DIAGNOSIS — M25.511 CHRONIC RIGHT SHOULDER PAIN: Primary | ICD-10-CM

## 2024-04-15 DIAGNOSIS — G89.29 CHRONIC RIGHT SHOULDER PAIN: Primary | ICD-10-CM

## 2024-04-15 PROCEDURE — 97530 THERAPEUTIC ACTIVITIES: CPT | Performed by: PHYSICAL THERAPIST

## 2024-04-15 PROCEDURE — 97110 THERAPEUTIC EXERCISES: CPT | Performed by: PHYSICAL THERAPIST

## 2024-04-15 PROCEDURE — 97140 MANUAL THERAPY 1/> REGIONS: CPT | Performed by: PHYSICAL THERAPIST

## 2024-04-15 NOTE — PROGRESS NOTES
Physical Therapy Daily Treatment Note  Visit: 21    Chris Tinsley reports: has prescription for topical pain relief cream but not filled yet.   YOB: 1950  Referring practitioner : Bryce Hill  Date of Initial: Type: THERAPY  Noted: 2/14/2024  Today's date: 4/15/2024  Patient seen for 21 sessions    Visit Diagnoses:    ICD-10-CM ICD-9-CM   1. Chronic right shoulder pain  M25.511 719.41    G89.29 338.29       Subjective       Objective   PROM R shoulder ER 35 degrees, scaption 105 degrees end of visit  See Exercise, Manual, and Modality Logs for complete treatment.       Assessment/Plan    Improving R shoulder PROM/AROM/strength.  Still painful at endrange of all ROM especially FL/ER but range extending and pain lessening.     Plan:    Continue current             Timed:         Manual Therapy:    18    mins  79417;     Therapeutic Exercise:    12     mins  44263;     Therapeutic Activity:     10     mins  20551;           Timed Treatment:   40   mins   Total Treatment:     40   mins         Baljinder Hernandez PT, DPT  Physical Therapist  [place] Lic #[id number]

## 2024-04-18 ENCOUNTER — TREATMENT (OUTPATIENT)
Dept: PHYSICAL THERAPY | Facility: CLINIC | Age: 74
End: 2024-04-18
Payer: MEDICARE

## 2024-04-18 DIAGNOSIS — M25.511 CHRONIC RIGHT SHOULDER PAIN: Primary | ICD-10-CM

## 2024-04-18 DIAGNOSIS — G89.29 CHRONIC RIGHT SHOULDER PAIN: Primary | ICD-10-CM

## 2024-04-18 PROCEDURE — 97530 THERAPEUTIC ACTIVITIES: CPT | Performed by: PHYSICAL THERAPIST

## 2024-04-18 PROCEDURE — 97110 THERAPEUTIC EXERCISES: CPT | Performed by: PHYSICAL THERAPIST

## 2024-04-18 PROCEDURE — 97140 MANUAL THERAPY 1/> REGIONS: CPT | Performed by: PHYSICAL THERAPIST

## 2024-04-22 ENCOUNTER — TREATMENT (OUTPATIENT)
Dept: PHYSICAL THERAPY | Facility: CLINIC | Age: 74
End: 2024-04-22
Payer: MEDICARE

## 2024-04-22 DIAGNOSIS — M25.511 CHRONIC RIGHT SHOULDER PAIN: Primary | ICD-10-CM

## 2024-04-22 DIAGNOSIS — G89.29 CHRONIC RIGHT SHOULDER PAIN: Primary | ICD-10-CM

## 2024-04-22 PROCEDURE — 97140 MANUAL THERAPY 1/> REGIONS: CPT | Performed by: PHYSICAL THERAPIST

## 2024-04-22 PROCEDURE — 97110 THERAPEUTIC EXERCISES: CPT | Performed by: PHYSICAL THERAPIST

## 2024-04-22 PROCEDURE — 97530 THERAPEUTIC ACTIVITIES: CPT | Performed by: PHYSICAL THERAPIST

## 2024-04-22 NOTE — PROGRESS NOTES
Physical Therapy Daily Treatment Note  Visit: 22    Chris Alvina reports: returned to MD today and instructed to continue PT for ROM/strength in his right shoulder.   YOB: 1950  Referring practitioner : Bryce Hill  Date of Initial: Type: THERAPY  Noted: 2/14/2024  Today's date: 4/22/2024  Patient seen for 22 sessions    Visit Diagnoses:    ICD-10-CM ICD-9-CM   1. Chronic right shoulder pain  M25.511 719.41    G89.29 338.29       Subjective       Objective   See Exercise, Manual, and Modality Logs for complete treatment.       Assessment/Plan    More painful with PROM/AROM of R shoulder this visit.  Strength and AROM improving but limited per pain and still requires verbal cueing to avoid substitution at ends of ROM especially FL.     Plan:    Continue            Timed:         Manual Therapy:    17     mins  18839;     Therapeutic Exercise:    12     mins  69595;       Therapeutic Activity:     10     mins  92490;           Timed Treatment:   39   mins   Total Treatment:     39   mins         Baljinder Hernandez PT, DPT  Physical Therapist  [place] Lic #[id number]

## 2024-04-24 ENCOUNTER — TREATMENT (OUTPATIENT)
Dept: PHYSICAL THERAPY | Facility: CLINIC | Age: 74
End: 2024-04-24
Payer: MEDICARE

## 2024-04-24 ENCOUNTER — OFFICE VISIT (OUTPATIENT)
Dept: CARDIOLOGY | Facility: CLINIC | Age: 74
End: 2024-04-24
Payer: MEDICARE

## 2024-04-24 VITALS
BODY MASS INDEX: 25.01 KG/M2 | DIASTOLIC BLOOD PRESSURE: 78 MMHG | OXYGEN SATURATION: 98 % | WEIGHT: 165 LBS | SYSTOLIC BLOOD PRESSURE: 127 MMHG | HEART RATE: 66 BPM | HEIGHT: 68 IN

## 2024-04-24 DIAGNOSIS — I25.110 CORONARY ARTERY DISEASE INVOLVING NATIVE CORONARY ARTERY OF NATIVE HEART WITH UNSTABLE ANGINA PECTORIS: ICD-10-CM

## 2024-04-24 DIAGNOSIS — Z95.1 S/P CABG (CORONARY ARTERY BYPASS GRAFT): Primary | ICD-10-CM

## 2024-04-24 DIAGNOSIS — E78.2 MIXED HYPERLIPIDEMIA: ICD-10-CM

## 2024-04-24 DIAGNOSIS — G89.29 CHRONIC RIGHT SHOULDER PAIN: Primary | ICD-10-CM

## 2024-04-24 DIAGNOSIS — M25.511 CHRONIC RIGHT SHOULDER PAIN: Primary | ICD-10-CM

## 2024-04-24 DIAGNOSIS — I10 BENIGN ESSENTIAL HTN: Chronic | ICD-10-CM

## 2024-04-24 PROCEDURE — 97110 THERAPEUTIC EXERCISES: CPT | Performed by: PHYSICAL THERAPIST

## 2024-04-24 PROCEDURE — 97530 THERAPEUTIC ACTIVITIES: CPT | Performed by: PHYSICAL THERAPIST

## 2024-04-24 PROCEDURE — 97140 MANUAL THERAPY 1/> REGIONS: CPT | Performed by: PHYSICAL THERAPIST

## 2024-04-24 NOTE — PROGRESS NOTES
Physical Therapy Daily Treatment Note  Visit: 24    Chris Tinsley reports: forgot to take pain medication before today's visit and has not done HEP this morning per other MD appointments.   YOB: 1950  Referring practitioner : Bryce Hill  Date of Initial: Type: THERAPY  Noted: 2/14/2024  Today's date: 4/24/2024  Patient seen for 24 sessions    Visit Diagnoses:    ICD-10-CM ICD-9-CM   1. Chronic right shoulder pain  M25.511 719.41    G89.29 338.29       Subjective       Objective   See Exercise, Manual, and Modality Logs for complete treatment.       Assessment/Plan    More limited R shoulder active/passive ROM initially with increased pain but improved to prior level end of session. Discussed treatment approach including continued course, further exam of cervical spine per possible contribution to symptoms and possible trial dry needling.     Plan:  Continue            Timed:         Manual Therapy:    21     mins  98795;     Therapeutic Exercise:    10     mins  89151;     Therapeutic Activity:     8     mins  73947;         Timed Treatment:   39   mins   Total Treatment:     39   mins         Baljinder Hernandez PT, DPT  Physical Therapist  [place] Lic #[id number]

## 2024-04-24 NOTE — PROGRESS NOTES
Physical Therapy Daily Treatment Note  Visit: 23    Chris Tinsley reports: no new issues today.   YOB: 1950  Referring practitioner : Bryce Hill  Date of Initial: Type: THERAPY  Noted: 2/14/2024  Today's date: 4/24/2024  Patient seen for 23 sessions    Visit Diagnoses:    ICD-10-CM ICD-9-CM   1. Chronic right shoulder pain  M25.511 719.41    G89.29 338.29       Subjective       Objective   See Exercise, Manual, and Modality Logs for complete treatment.       Assessment/Plan    PROM/AROM improving in R shoulder but still significant limits with PROM per pain at ends of ROM and lifting with R UE.     Plan:    Continue            Timed:         Manual Therapy:    20     mins  22647;     Therapeutic Exercise:    12     mins  38802;     Therapeutic Activity:     10     mins  28386;           Timed Treatment:   42   mins   Total Treatment:     42   mins         Baljinder Hernandez PT, DPT  Physical Therapist  [place] Lic #[id number]

## 2024-04-25 ENCOUNTER — TELEPHONE (OUTPATIENT)
Dept: ORTHOPEDICS | Facility: OTHER | Age: 74
End: 2024-04-25
Payer: MEDICARE

## 2024-04-26 ENCOUNTER — TREATMENT (OUTPATIENT)
Dept: PHYSICAL THERAPY | Facility: CLINIC | Age: 74
End: 2024-04-26
Payer: MEDICARE

## 2024-04-26 DIAGNOSIS — G89.29 CHRONIC RIGHT SHOULDER PAIN: Primary | ICD-10-CM

## 2024-04-26 DIAGNOSIS — M25.511 CHRONIC RIGHT SHOULDER PAIN: Primary | ICD-10-CM

## 2024-04-26 NOTE — PROGRESS NOTES
Physical Therapy Daily Treatment Note  Visit: 25    Chris Tinsley reports: tried to get on tractor yesterday to assist with moving but got wedged between seat.  Reports his wife was able to assist him getting back up but is sore today.  Also reports he would like to try dry needling today.   YOB: 1950  Referring practitioner : Bryce Hill  Date of Initial: Type: THERAPY  Noted: 2/14/2024  Today's date: 4/26/2024  Patient seen for 25 sessions    Visit Diagnoses:    ICD-10-CM ICD-9-CM   1. Chronic right shoulder pain  M25.511 719.41    G89.29 338.29       Subjective       Objective   See Exercise, Manual, and Modality Logs for complete treatment.       Assessment/Plan    Positive response to dry needling with minor pain decrease and greater ease of R shoulder AROM post.     Plan:    Continue            Timed:         Manual Therapy:    18     mins  96059;     Therapeutic Exercise:    12     mins  53496;     Therapeutic Activity:     10     mins  57866;          Un-Timed:  Dry Needling     12     mins self-pay      Timed Treatment:   40   mins   Total Treatment:     52   mins         Baljinder Hernandez PT, DPT  Physical Therapist  [place] Lic #[id number]

## 2024-04-29 ENCOUNTER — TREATMENT (OUTPATIENT)
Dept: PHYSICAL THERAPY | Facility: CLINIC | Age: 74
End: 2024-04-29
Payer: MEDICARE

## 2024-04-29 DIAGNOSIS — M25.511 CHRONIC RIGHT SHOULDER PAIN: Primary | ICD-10-CM

## 2024-04-29 DIAGNOSIS — G89.29 CHRONIC RIGHT SHOULDER PAIN: Primary | ICD-10-CM

## 2024-04-29 PROCEDURE — 20561 NDL INSJ W/O NJX 3+ MUSC: CPT | Performed by: PHYSICAL THERAPIST

## 2024-04-29 PROCEDURE — 97140 MANUAL THERAPY 1/> REGIONS: CPT | Performed by: PHYSICAL THERAPIST

## 2024-04-29 PROCEDURE — 97110 THERAPEUTIC EXERCISES: CPT | Performed by: PHYSICAL THERAPIST

## 2024-04-29 NOTE — PROGRESS NOTES
Physical Therapy Daily Treatment Note  Visit: 26    Chris Tinsley reports: thinks dry needling may have helped some with pain last visit.  Reports was not able to do HEP as frequently over the weekend.   YOB: 1950  Referring practitioner : Bryce Hill  Date of Initial: Type: THERAPY  Noted: 2/14/2024  Today's date: 4/29/2024  Patient seen for 26 sessions    Visit Diagnoses:    ICD-10-CM ICD-9-CM   1. Chronic right shoulder pain  M25.511 719.41    G89.29 338.29       Subjective       Objective   See Exercise, Manual, and Modality Logs for complete treatment.       Assessment/Plan               Timed:         Manual Therapy:    15     mins  49485;     Therapeutic Exercise:    23     mins  37840;         Un-Timed:  Dry Needling     10     mins self-pay        Timed Treatment:   38   mins   Total Treatment:     48   mins         Baljinder Hernandez PT, DPT  Physical Therapist  [place] Lic #[id number]

## 2024-05-01 ENCOUNTER — TREATMENT (OUTPATIENT)
Dept: PHYSICAL THERAPY | Facility: CLINIC | Age: 74
End: 2024-05-01
Payer: MEDICARE

## 2024-05-01 DIAGNOSIS — G89.29 CHRONIC RIGHT SHOULDER PAIN: Primary | ICD-10-CM

## 2024-05-01 DIAGNOSIS — M25.511 CHRONIC RIGHT SHOULDER PAIN: Primary | ICD-10-CM

## 2024-05-01 PROCEDURE — 97140 MANUAL THERAPY 1/> REGIONS: CPT | Performed by: PHYSICAL THERAPIST

## 2024-05-01 PROCEDURE — 97110 THERAPEUTIC EXERCISES: CPT | Performed by: PHYSICAL THERAPIST

## 2024-05-01 PROCEDURE — 20561 NDL INSJ W/O NJX 3+ MUSC: CPT | Performed by: PHYSICAL THERAPIST

## 2024-05-01 NOTE — PROGRESS NOTES
Physical Therapy Daily Treatment Note  Visit: 27    Chris Tinsley reports: no new issues today.   YOB: 1950  Referring practitioner : Bryce Hill  Date of Initial: Type: THERAPY  Noted: 2/14/2024  Today's date: 5/1/2024  Patient seen for 27 sessions    Visit Diagnoses:    ICD-10-CM ICD-9-CM   1. Chronic right shoulder pain  M25.511 719.41    G89.29 338.29       Subjective       Objective   AAROM R shoulder FL, ER PROM 35 degrees  See Exercise, Manual, and Modality Logs for complete treatment.       Assessment/Plan    Improved R shoulder ER/FL this visit with less pain at endrange.  Dry needling still helpful for pain.     Plan:    Continue          Timed:         Manual Therapy:    15     mins  34593;     Therapeutic Exercise:    24     mins  17733;           Un-Timed:  Dry Needling     10     mins self-pay        Timed Treatment:   39   mins   Total Treatment:     49   mins         Baljinder Hernandez PT, DPT  Physical Therapist  [place] Lic #[id number]

## 2024-05-02 ENCOUNTER — TREATMENT (OUTPATIENT)
Dept: PHYSICAL THERAPY | Facility: CLINIC | Age: 74
End: 2024-05-02
Payer: MEDICARE

## 2024-05-02 DIAGNOSIS — G89.29 CHRONIC RIGHT SHOULDER PAIN: Primary | ICD-10-CM

## 2024-05-02 DIAGNOSIS — M25.511 CHRONIC RIGHT SHOULDER PAIN: Primary | ICD-10-CM

## 2024-05-02 NOTE — PROGRESS NOTES
Physical Therapy Daily Treatment Note  Visit: 28    Chris Tinsley reports: no new issues.   YOB: 1950  Referring practitioner : Bryce Hill  Date of Initial: Type: THERAPY  Noted: 2/14/2024  Today's date: 5/2/2024  Patient seen for 28 sessions    Visit Diagnoses:    ICD-10-CM ICD-9-CM   1. Chronic right shoulder pain  M25.511 719.41    G89.29 338.29       Subjective       Objective   AAROM R shoulder  degrees, ER 40 degrees  See Exercise, Manual, and Modality Logs for complete treatment.       Assessment/Plan    Improving R shoulder AROM/PROM and strength.  Still requires cueing to avoid cervical and scapular substitution with endranges of shoulder motion.     Plan:    Continue            Timed:         Manual Therapy:    15     mins  59708;     Therapeutic Exercise:    15     mins  30666;          Un-Timed:  Dry Needling     10     mins self-pay      Timed Treatment:   30   mins   Total Treatment:     40   mins         Baljinder Hernandez PT, DPT  Physical Therapist  [place] Lic #[id number]

## 2024-05-04 NOTE — ED NOTES
Nursing report ED to floor  Chris Tinsley  72 y.o.  male    HPI:   Chief Complaint   Patient presents with    Chest Pain       Admitting doctor:   Kyrie Thurston MD    Admitting diagnosis:   The primary encounter diagnosis was Chest tightness. Diagnoses of Left hand paresthesia and Muscle spasms of neck were also pertinent to this visit.    Code status:   Current Code Status       Date Active Code Status Order ID Comments User Context       Prior            Allergies:   Patient has no known allergies.    Isolation:  No active isolations     Fall Risk:  Fall Risk Assessment was completed, and patient is at moderate risk for falls.   Predictive Model Details         4 (Low) Factor Value    Calculated 1/3/2023 18:06 Age 72    Risk of Fall Model Musculoskeletal Assessment WDL     Active Peripheral IV Present     Imaging order in this encounter Present     Skin Assessment WDL     Magnesium not on file     Drug Use No     Christiano Scale not on file     Creatinine 1.64 mg/dL     Financial Class Private Insurance     Peripheral Vascular Assessment WDL     Cardiac Assessment X     Diastolic BP 79     Sex Male     Gastrointestinal Assessment WDL     Chloride 102 mmol/L     Albumin 4.2 g/dL     Days after Admission 0.117     Potassium 4.3 mmol/L     Total Bilirubin 0.3 mg/dL     Calcium 9.7 mg/dL     Respiratory Rate 15     ALT 17 U/L         Weight:       01/03/23 2044   Weight: 82.6 kg (182 lb)       Intake and Output  No intake or output data in the 24 hours ending 01/03/23 2055    Diet:   Dietary Orders (From admission, onward)       Start     Ordered    01/04/23 0001  NPO Diet NPO Type: Strict NPO  Diet Effective Midnight        Question:  NPO Type  Answer:  Strict NPO    01/03/23 1911 01/03/23 1912  Diet: Cardiac Diets; Healthy Heart (2-3 Na+); Texture: Regular Texture (IDDSI 7); Fluid Consistency: Thin (IDDSI 0)  Diet Effective Now        References:    Diet Order [place]   Question Answer Comment   Diets:  Cardiac Diets    Cardiac Diet: Healthy Heart (2-3 Na+)    Texture: Regular Texture (IDDSI 7)    Fluid Consistency: Thin (IDDSI 0)        01/03/23 1911                     Most recent vitals:   Vitals:    01/03/23 1700 01/03/23 1746 01/03/23 1756 01/03/23 2044   BP: 112/63 121/79  113/75   BP Location:    Right arm   Patient Position:    Lying   Pulse: 63 64 66 72   Resp:    15   Temp:    98 °F (36.7 °C)   TempSrc:    Oral   SpO2: 93% 93% 95% 97%   Weight:    82.6 kg (182 lb)   Height:    172.7 cm (68\")       Active LDAs/IV Access:   Lines, Drains & Airways       Active LDAs       Name Placement date Placement time Site Days    Peripheral IV 01/03/23 1543 Left Antecubital 01/03/23  1543  Antecubital  less than 1                    Skin Condition:   Skin Assessments (last day)       Date/Time Skin WDL    01/03/23 15:34:02 WDL             Labs (abnormal labs have a star):   Labs Reviewed   COMPREHENSIVE METABOLIC PANEL - Abnormal; Notable for the following components:       Result Value    Glucose 121 (*)     BUN 28 (*)     Creatinine 1.64 (*)     eGFR 44.7 (*)     All other components within normal limits    Narrative:     GFR Normal >60  Chronic Kidney Disease <60  Kidney Failure <15     CBC WITH AUTO DIFFERENTIAL - Abnormal; Notable for the following components:    Platelets 120 (*)     Lymphocyte % 15.5 (*)     All other components within normal limits   TROPONIN (IN-HOUSE) - Normal    Narrative:     Troponin T Reference Range:  <= 0.03 ng/mL-   Negative for AMI  >0.03 ng/mL-     Abnormal for myocardial necrosis.  Clinicians would have to utilize clinical acumen, EKG, Troponin and serial changes to determine if it is an Acute Myocardial Infarction or myocardial injury due to an underlying chronic condition.       Results may be falsely decreased if patient taking Biotin.     TROPONIN (IN-HOUSE) - Normal    Narrative:     Troponin T Reference Range:  <= 0.03 ng/mL-   Negative for AMI  >0.03 ng/mL-     Abnormal for  myocardial necrosis.  Clinicians would have to utilize clinical acumen, EKG, Troponin and serial changes to determine if it is an Acute Myocardial Infarction or myocardial injury due to an underlying chronic condition.       Results may be falsely decreased if patient taking Biotin.     RAINBOW DRAW    Narrative:     The following orders were created for panel order [place] Draw.  Procedure                               Abnormality         Status                     ---------                               -----------         ------                     Green Top (Gel)[[id number]]                                  Final result               Lavender Top[[id number]]                                                                Gold Top - SST[[id number]]                                   Final result               Light Blue Top[[id number]]                                   Final result                 Please view results for these tests on the individual orders.   TROPONIN (IN-HOUSE)   CBC AND DIFFERENTIAL    Narrative:     The following orders were created for panel order CBC & Differential.  Procedure                               Abnormality         Status                     ---------                               -----------         ------                     CBC Auto Differential[[id number]]        Abnormal            Final result                 Please view results for these tests on the individual orders.   GREEN TOP   GOLD TOP - SST   LIGHT BLUE TOP       LOC: Person, Place, Time, and Situation    Telemetry:      Cardiac Monitoring Ordered: no    EKG:   ECG 12 Lead Chest Pain   Preliminary Result   HEART RATE= 58  bpm   RR Interval= 1036  ms   CA Interval= 161  ms   P Horizontal Axis= 3  deg   P Front Axis= 45  deg   QRSD Interval= 103  ms   QT Interval= 417  ms   QRS Axis= -15  deg   T Wave Axis= -1  deg   - ABNORMAL ECG -   Sinus bradycardia   Probable inferior infarct, age indeterminate   Electronically Signed  By:    Date and Time of Study: 2023-01-03 19:21:20      ECG 12 Lead Chest Pain   Preliminary Result   HEART RATE= 72  bpm   RR Interval= 836  ms   [place] Interval= 151  ms   P Horizontal Axis= -8  deg   P Front Axis= 57  deg   QRSD Interval= 97  ms   QT Interval= 379  ms   QRS Axis= -10  deg   T Wave Axis= 2  deg   - ABNORMAL ECG -   Sinus rhythm   Inferior infarct, old   When compared with ECG of 26-Jun-2019 4:14:32,   Significant axis, voltage or hypertrophy change   Electronically Signed By:    Date and Time of Study: 2023-01-03 15:12:16          Medications Given in the ED:   Medications   sodium chloride 0.9 % flush 10 mL (has no administration in time range)   aspirin chewable tablet 324 mg (324 mg Oral Not Given 1/3/23 1635)   sodium chloride 0.9 % flush 10 mL (has no administration in time range)   sodium chloride 0.9 % flush 10 mL (has no administration in time range)   sodium chloride 0.9 % infusion 40 mL (has no administration in time range)   morphine injection 1 mg (has no administration in time range)     And   naloxone (NARCAN) injection 0.4 mg (has no administration in time range)   tiZANidine (ZANAFLEX) tablet 4 mg (4 mg Oral Given 1/3/23 1917)   LORazepam (ATIVAN) injection 0.5 mg (has no administration in time range)       Imaging results:  XR Spine Cervical Complete 4 or 5 View    Result Date: 1/3/2023  Impression: 1.No acute osseous process identified. 2.Multilevel degenerative changes as described above. Electronically Signed: Chris Osorio  1/3/2023 4:31 PM EST  Workstation ID: [id number]    XR Chest 1 View    Result Date: 1/3/2023  Impression: No acute cardiopulmonary process. Electronically Signed: Chris Osorio  1/3/2023 4:30 PM EST  Workstation ID: [id number]     Social issues:   Social History     Socioeconomic History    Marital status:    Tobacco Use    Smoking status: Never    Smokeless tobacco: Never   Vaping Use    Vaping Use: Never used   Substance and Sexual Activity    Alcohol  use: No    Drug use: No    Sexual activity: Defer       NIH Stroke Scale:  Interval: (not recorded)  1a. Level of Consciousness: (not recorded)  1b. LOC Questions: (not recorded)  1c. LOC Commands: (not recorded)  2. Best Gaze: (not recorded)  3. Visual: (not recorded)  4. Facial Palsy: (not recorded)  5a. Motor Arm, Left: (not recorded)  5b. Motor Arm, Right: (not recorded)  6a. Motor Leg, Left: (not recorded)  6b. Motor Leg, Right: (not recorded)  7. Limb Ataxia: (not recorded)  8. Sensory: (not recorded)  9. Best Language: (not recorded)  10. Dysarthria: (not recorded)  11. Extinction and Inattention (formerly Neglect): (not recorded)    Total (NIH Stroke Scale): (not recorded)     Additional notable assessment information:       Nursing report ED to floor:  Sapna Orta RN   01/03/23 20:55 EST     36.7

## 2024-05-06 ENCOUNTER — TREATMENT (OUTPATIENT)
Dept: PHYSICAL THERAPY | Facility: CLINIC | Age: 74
End: 2024-05-06
Payer: MEDICARE

## 2024-05-06 DIAGNOSIS — M25.511 CHRONIC RIGHT SHOULDER PAIN: Primary | ICD-10-CM

## 2024-05-06 DIAGNOSIS — G89.29 CHRONIC RIGHT SHOULDER PAIN: Primary | ICD-10-CM

## 2024-05-06 PROCEDURE — 97530 THERAPEUTIC ACTIVITIES: CPT | Performed by: PHYSICAL THERAPIST

## 2024-05-06 PROCEDURE — 97110 THERAPEUTIC EXERCISES: CPT | Performed by: PHYSICAL THERAPIST

## 2024-05-06 PROCEDURE — 97140 MANUAL THERAPY 1/> REGIONS: CPT | Performed by: PHYSICAL THERAPIST

## 2024-05-06 PROCEDURE — 20561 NDL INSJ W/O NJX 3+ MUSC: CPT | Performed by: PHYSICAL THERAPIST

## 2024-05-08 ENCOUNTER — TREATMENT (OUTPATIENT)
Dept: PHYSICAL THERAPY | Facility: CLINIC | Age: 74
End: 2024-05-08
Payer: MEDICARE

## 2024-05-08 DIAGNOSIS — M25.511 CHRONIC RIGHT SHOULDER PAIN: Primary | ICD-10-CM

## 2024-05-08 DIAGNOSIS — G89.29 CHRONIC RIGHT SHOULDER PAIN: Primary | ICD-10-CM

## 2024-05-08 PROCEDURE — 97530 THERAPEUTIC ACTIVITIES: CPT | Performed by: PHYSICAL THERAPIST

## 2024-05-08 PROCEDURE — 97110 THERAPEUTIC EXERCISES: CPT | Performed by: PHYSICAL THERAPIST

## 2024-05-08 PROCEDURE — 97140 MANUAL THERAPY 1/> REGIONS: CPT | Performed by: PHYSICAL THERAPIST

## 2024-05-10 ENCOUNTER — TREATMENT (OUTPATIENT)
Dept: PHYSICAL THERAPY | Facility: CLINIC | Age: 74
End: 2024-05-10
Payer: MEDICARE

## 2024-05-10 DIAGNOSIS — G89.29 CHRONIC RIGHT SHOULDER PAIN: Primary | ICD-10-CM

## 2024-05-10 DIAGNOSIS — M25.511 CHRONIC RIGHT SHOULDER PAIN: Primary | ICD-10-CM

## 2024-05-28 ENCOUNTER — TREATMENT (OUTPATIENT)
Dept: PHYSICAL THERAPY | Facility: CLINIC | Age: 74
End: 2024-05-28
Payer: MEDICARE

## 2024-05-28 DIAGNOSIS — G89.29 CHRONIC RIGHT SHOULDER PAIN: Primary | ICD-10-CM

## 2024-05-28 DIAGNOSIS — M25.511 CHRONIC RIGHT SHOULDER PAIN: Primary | ICD-10-CM

## 2024-05-28 PROCEDURE — 97110 THERAPEUTIC EXERCISES: CPT | Performed by: PHYSICAL THERAPIST

## 2024-05-28 PROCEDURE — 97140 MANUAL THERAPY 1/> REGIONS: CPT | Performed by: PHYSICAL THERAPIST

## 2024-05-28 NOTE — PROGRESS NOTES
Physical Therapy Daily Treatment Note  Visit: 32    Chris Tinsley reports: returned to MD office and PA felt right shoulder pain should be less by now and felt could be muscular related.  His wife reports he also had abominal mass and further assessment needed to determine pathology and treatment.    YOB: 1950  Referring practitioner : Bryce Hill  Date of Initial: Type: THERAPY  Noted: 2/14/2024  Today's date: 5/28/2024  Patient seen for 32 sessions    Visit Diagnoses:    ICD-10-CM ICD-9-CM   1. Chronic right shoulder pain  M25.511 719.41    G89.29 338.29       Subjective       Objective   See Exercise, Manual, and Modality Logs for complete treatment.       Assessment/Plan    Discussed continued care and and treatment approach for R shoulder.  Swelling noted in R UE and improved with elevation/AAROM and light resistive activity.   Educated on less intense HEP for ROM and resistive activity to determine if helpful to lessen pain.     Plan:    Continue           Timed:         Manual Therapy:    15     mins  65971;     Therapeutic Exercise:    15     mins  69035;         Timed Treatment:   30   mins   Total Treatment:     30   mins         Baljinder Hernandez PT, DPT  Physical Therapist  [place] Lic #[id number]

## 2024-05-30 ENCOUNTER — TREATMENT (OUTPATIENT)
Dept: PHYSICAL THERAPY | Facility: CLINIC | Age: 74
End: 2024-05-30
Payer: MEDICARE

## 2024-05-30 DIAGNOSIS — M25.511 CHRONIC RIGHT SHOULDER PAIN: Primary | ICD-10-CM

## 2024-05-30 DIAGNOSIS — G89.29 CHRONIC RIGHT SHOULDER PAIN: Primary | ICD-10-CM

## 2024-05-30 NOTE — PROGRESS NOTES
Physical Therapy Daily Treatment Note  Visit: 33    Chris Tinsley reports: sore with last visit and has begun to do light stretching at home again.   YOB: 1950  Referring practitioner : Bryce Hill  Date of Initial: Type: THERAPY  Noted: 2/14/2024  Today's date: 5/30/2024  Patient seen for 33 sessions    Visit Diagnoses:    ICD-10-CM ICD-9-CM   1. Chronic right shoulder pain  M25.511 719.41    G89.29 338.29       Subjective       Objective   See Exercise, Manual, and Modality Logs for complete treatment.       Assessment/Plan    Improved but limited passive and active R shoulder ROM especially ER.  Instructed for more frequent HEP but alternating days where he stretches with more force/duration with lighter days.  Also instructed to work to fatigue in his shoulder w/o pain if possible but limit strengthening if too sore.     Plan:    Continue            Timed:         Manual Therapy:    15     mins  87112;     Therapeutic Exercise:    14     mins  66129;     Therapeutic Activity:     10     mins  73310;         Timed Treatment:   39   mins   Total Treatment:     39   mins         Baljinder Hernandez PT, DPT  Physical Therapist  [place] Lic #[id number]

## 2024-06-03 ENCOUNTER — TREATMENT (OUTPATIENT)
Dept: PHYSICAL THERAPY | Facility: CLINIC | Age: 74
End: 2024-06-03
Payer: MEDICARE

## 2024-06-03 ENCOUNTER — TELEPHONE (OUTPATIENT)
Dept: PAIN MEDICINE | Facility: CLINIC | Age: 74
End: 2024-06-03
Payer: MEDICARE

## 2024-06-03 DIAGNOSIS — M25.511 CHRONIC RIGHT SHOULDER PAIN: Primary | ICD-10-CM

## 2024-06-03 DIAGNOSIS — G89.29 CHRONIC RIGHT SHOULDER PAIN: Primary | ICD-10-CM

## 2024-06-03 PROCEDURE — 97530 THERAPEUTIC ACTIVITIES: CPT | Performed by: PHYSICAL THERAPIST

## 2024-06-03 PROCEDURE — 97140 MANUAL THERAPY 1/> REGIONS: CPT | Performed by: PHYSICAL THERAPIST

## 2024-06-03 PROCEDURE — 97110 THERAPEUTIC EXERCISES: CPT | Performed by: PHYSICAL THERAPIST

## 2024-06-03 NOTE — PROGRESS NOTES
Physical Therapy Daily Treatment Note  Visit: 34    Chris Tinsley reports: no new issues today.   YOB: 1950  Referring practitioner : Bryce Hill  Date of Initial: Type: THERAPY  Noted: 2/14/2024  Today's date: 6/3/2024  Patient seen for 34 sessions    Visit Diagnoses:    ICD-10-CM ICD-9-CM   1. Chronic right shoulder pain  M25.511 719.41    G89.29 338.29       Subjective       Objective   See Exercise, Manual, and Modality Logs for complete treatment.       Assessment/Plan    Improved R shoulder ER/active and passive ROM and R shoulder FL strength.     Plan:    Continue            Timed:         Manual Therapy:    20     mins  97116;     Therapeutic Exercise:    10     mins  05922;     Therapeutic Activity:     9    mins  99918;           Timed Treatment:   39   mins   Total Treatment:     39   mins         Baljinder Hernandez PT, DPT  Physical Therapist  [place] Lic #[id number]

## 2024-06-03 NOTE — TELEPHONE ENCOUNTER
Caller: EDYTA LAURA    Relationship to patient: Emergency Contact    Best call back number: [phone]    Patient is needing: PT HAD HIS SHOULDER REPLACEMENT COMPLETED AND IS NOW WANTING TO KNOW HOW HE CAN GET STARTED BACK ON THE PAIN MEDICATION THAT DR VALE WAS PRESCRIBING.. PT IS WANTING A CALL BACK.. PLEASE ADVISE..

## 2024-06-05 ENCOUNTER — TREATMENT (OUTPATIENT)
Dept: PHYSICAL THERAPY | Facility: CLINIC | Age: 74
End: 2024-06-05
Payer: MEDICARE

## 2024-06-05 DIAGNOSIS — M25.511 CHRONIC RIGHT SHOULDER PAIN: Primary | ICD-10-CM

## 2024-06-05 DIAGNOSIS — G89.29 CHRONIC RIGHT SHOULDER PAIN: Primary | ICD-10-CM

## 2024-06-05 NOTE — PROGRESS NOTES
Physical Therapy Daily Treatment Note  Visit: 35    Chris Tinsley reports: sore with last visit - better today.   YOB: 1950  Referring practitioner : Bryce Hill  Date of Initial: Type: THERAPY  Noted: 2/14/2024  Today's date: 6/5/2024  Patient seen for 35 sessions    Visit Diagnoses:    ICD-10-CM ICD-9-CM   1. Chronic right shoulder pain  M25.511 719.41    G89.29 338.29       Subjective       Objective   See Exercise, Manual, and Modality Logs for complete treatment.       Assessment/Plan    R shoulder ER biggest limited passively, improved R shoulder strength in FL but still unable move above 90 FL w/o assistance.     Plan:    Continue            Timed:         Manual Therapy:    15     mins  49046;     Therapeutic Exercise:    15     mins  48701;     Therapeutic Activity:     8     mins  69230;         Timed Treatment:   38   mins   Total Treatment:     38   mins         Baljinder Hernandez PT, DPT  Physical Therapist  [place] Lic #[id number]

## 2024-06-07 ENCOUNTER — TREATMENT (OUTPATIENT)
Dept: PHYSICAL THERAPY | Facility: CLINIC | Age: 74
End: 2024-06-07
Payer: MEDICARE

## 2024-06-07 DIAGNOSIS — M25.511 CHRONIC RIGHT SHOULDER PAIN: Primary | ICD-10-CM

## 2024-06-07 DIAGNOSIS — G89.29 CHRONIC RIGHT SHOULDER PAIN: Primary | ICD-10-CM

## 2024-06-07 PROCEDURE — 97110 THERAPEUTIC EXERCISES: CPT | Performed by: PHYSICAL THERAPIST

## 2024-06-07 PROCEDURE — 97140 MANUAL THERAPY 1/> REGIONS: CPT | Performed by: PHYSICAL THERAPIST

## 2024-06-10 ENCOUNTER — TREATMENT (OUTPATIENT)
Dept: PHYSICAL THERAPY | Facility: CLINIC | Age: 74
End: 2024-06-10
Payer: MEDICARE

## 2024-06-10 DIAGNOSIS — M25.511 CHRONIC RIGHT SHOULDER PAIN: Primary | ICD-10-CM

## 2024-06-10 DIAGNOSIS — G89.29 CHRONIC RIGHT SHOULDER PAIN: Primary | ICD-10-CM

## 2024-06-10 PROCEDURE — 97140 MANUAL THERAPY 1/> REGIONS: CPT | Performed by: PHYSICAL THERAPIST

## 2024-06-10 PROCEDURE — 97530 THERAPEUTIC ACTIVITIES: CPT | Performed by: PHYSICAL THERAPIST

## 2024-06-10 PROCEDURE — 97110 THERAPEUTIC EXERCISES: CPT | Performed by: PHYSICAL THERAPIST

## 2024-06-10 NOTE — PROGRESS NOTES
Physical Therapy Daily Treatment Note  Visit: 36    Chris Tinsley reports: right arm sore today.   YOB: 1950  Referring practitioner : Bryce Hill  Date of Initial: Type: THERAPY  Noted: 2/14/2024  Today's date: 6/10/2024  Patient seen for 36 sessions    Visit Diagnoses:    ICD-10-CM ICD-9-CM   1. Chronic right shoulder pain  M25.511 719.41    G89.29 338.29       Subjective       Objective   See Exercise, Manual, and Modality Logs for complete treatment.       Assessment/Plan    R shoulder ER more limited today.  Spent extended time educating on HEP and correct technique for ER ROM with good understanding post tx.     Plan:    Continue           Timed:         Manual Therapy:    25     mins  21812;     Therapeutic Exercise:    15     mins  70564;         Timed Treatment:   40   mins   Total Treatment:     40   mins         Baljinder Hernandez PT, DPT  Physical Therapist  [place] Lic #[id number]

## 2024-06-10 NOTE — PROGRESS NOTES
Physical Therapy Daily Treatment Note  Visit: 37    Chris Tinsley reports: no new issues today.  His wife reports the tried new HEP per suggestion with pegboard and cups fro strengthening his right arm.   YOB: 1950  Referring practitioner : Bryce Hill  Date of Initial: Type: THERAPY  Noted: 2/14/2024  Today's date: 6/10/2024  Patient seen for 37 sessions    Visit Diagnoses:    ICD-10-CM ICD-9-CM   1. Chronic right shoulder pain  M25.511 719.41    G89.29 338.29       Subjective       Objective   See Exercise, Manual, and Modality Logs for complete treatment.       Assessment/Plan    Continued limits especially R shoulder ER passive and actively.  Improved post tx but stopped w/o completing UBE per shoulder pain.              Timed:         Manual Therapy:    20     mins  76453;     Therapeutic Exercise:    10     mins  70075;     Therapeutic Activity:     10     mins  84469;         Timed Treatment:   40   mins   Total Treatment:     40   mins         Baljinder Hernandez PT, DPT  Physical Therapist  [place] Lic #[id number]

## 2024-06-12 ENCOUNTER — OFFICE VISIT (OUTPATIENT)
Dept: PAIN MEDICINE | Facility: CLINIC | Age: 74
End: 2024-06-12
Payer: MEDICARE

## 2024-06-12 ENCOUNTER — TREATMENT (OUTPATIENT)
Dept: PHYSICAL THERAPY | Facility: CLINIC | Age: 74
End: 2024-06-12
Payer: MEDICARE

## 2024-06-12 VITALS
SYSTOLIC BLOOD PRESSURE: 156 MMHG | HEART RATE: 64 BPM | OXYGEN SATURATION: 97 % | WEIGHT: 174 LBS | BODY MASS INDEX: 26.46 KG/M2 | RESPIRATION RATE: 16 BRPM | DIASTOLIC BLOOD PRESSURE: 96 MMHG

## 2024-06-12 DIAGNOSIS — M50.20 DISPLACEMENT OF CERVICAL INTERVERTEBRAL DISC WITHOUT MYELOPATHY: ICD-10-CM

## 2024-06-12 DIAGNOSIS — Z79.899 HIGH RISK MEDICATION USE: Primary | ICD-10-CM

## 2024-06-12 DIAGNOSIS — M25.512 CHRONIC PAIN OF BOTH SHOULDERS: ICD-10-CM

## 2024-06-12 DIAGNOSIS — M25.511 CHRONIC RIGHT SHOULDER PAIN: Primary | ICD-10-CM

## 2024-06-12 DIAGNOSIS — G89.29 CHRONIC RIGHT SHOULDER PAIN: Primary | ICD-10-CM

## 2024-06-12 DIAGNOSIS — G89.29 CHRONIC PAIN OF BOTH SHOULDERS: ICD-10-CM

## 2024-06-12 DIAGNOSIS — M25.511 CHRONIC PAIN OF BOTH SHOULDERS: ICD-10-CM

## 2024-06-12 PROCEDURE — 1160F RVW MEDS BY RX/DR IN RCRD: CPT | Performed by: STUDENT IN AN ORGANIZED HEALTH CARE EDUCATION/TRAINING PROGRAM

## 2024-06-12 PROCEDURE — 3077F SYST BP >= 140 MM HG: CPT | Performed by: STUDENT IN AN ORGANIZED HEALTH CARE EDUCATION/TRAINING PROGRAM

## 2024-06-12 PROCEDURE — 99214 OFFICE O/P EST MOD 30 MIN: CPT | Performed by: STUDENT IN AN ORGANIZED HEALTH CARE EDUCATION/TRAINING PROGRAM

## 2024-06-12 PROCEDURE — 3080F DIAST BP >= 90 MM HG: CPT | Performed by: STUDENT IN AN ORGANIZED HEALTH CARE EDUCATION/TRAINING PROGRAM

## 2024-06-12 PROCEDURE — 1159F MED LIST DOCD IN RCRD: CPT | Performed by: STUDENT IN AN ORGANIZED HEALTH CARE EDUCATION/TRAINING PROGRAM

## 2024-06-12 PROCEDURE — 1125F AMNT PAIN NOTED PAIN PRSNT: CPT | Performed by: STUDENT IN AN ORGANIZED HEALTH CARE EDUCATION/TRAINING PROGRAM

## 2024-06-12 RX ORDER — HYDROCODONE BITARTRATE AND ACETAMINOPHEN 10; 325 MG/1; MG/1
1 TABLET ORAL EVERY 12 HOURS PRN
Qty: 60 TABLET | Refills: 0 | Status: SHIPPED | OUTPATIENT
Start: 2024-07-11

## 2024-06-12 RX ORDER — HYDROCODONE BITARTRATE AND ACETAMINOPHEN 10; 325 MG/1; MG/1
1 TABLET ORAL EVERY 12 HOURS PRN
Qty: 60 TABLET | Refills: 0 | Status: SHIPPED | OUTPATIENT
Start: 2024-06-12

## 2024-06-12 NOTE — PROGRESS NOTES
CHIEF COMPLAINT  Chief Complaint   Patient presents with    Shoulder Pain     R    Oxycodone LD 1130am today    Neck Pain       Primary Care  Nichole Paul, APRN    Subjective   Chris Tinsley is a 74 y.o. male  who presents for right shoulder pain medication management.  He states that approximately 6 months ago, he sustained a fall off a ladder and subsequently fractured the head of the humerus.  He also sustained a rotator cuff injury resulting in biceps tendinopathy as well as adhesive capsulitis.  He is currently being treated conservatively with his orthopedic surgeon, but continues to require pain medication.  He is currently working with physical therapy as well as exercising several times a day at home.    Neck Pain          Location: Right shoulder  Onset: 6 months ago  Duration: Improving  Timing: Constant throughout the day  Quality: Sharp, stabbing  Severity: Today: 3       Last Week: 3       Worst: 7  Modifying Factors: The pain is worse with exercising and working with physical therapy    Physical Therapy: yes    Interval Update 06/12/2024: Still having significant pain from his right shoulder surgery with significant range of motion reduction.  Also wishing to restart hydrocodone twice daily.    The following portions of the patient's history were reviewed and updated as appropriate: allergies, current medications, past family history, past medical history, past social history, past surgical history and problem list.    Procedures:  4/6/2023: Cervical epidural: 0% benefit      Current Outpatient Medications:     acyclovir (ZOVIRAX) 800 MG tablet, TAKE 1 TABLET BY MOUTH 5 TIMES A DAY FOR 10 DAYS, Disp: , Rfl:     aspirin 81 MG chewable tablet, Chew 1 tablet Daily., Disp: , Rfl:     atorvastatin (LIPITOR) 40 MG tablet, TAKE 1 TABLET BY MOUTH EVERY NIGHT, Disp: 90 tablet, Rfl: 1    cholecalciferol (VITAMIN D3) 25 MCG (1000 UT) tablet, Take 1 tablet by mouth Daily., Disp: , Rfl:     hydrocortisone 2.5  % cream, Apply 1 application  topically to the appropriate area as directed 2 (Two) Times a Day., Disp: 30 g, Rfl: 1    lisinopril (PRINIVIL,ZESTRIL) 10 MG tablet, TAKE 1 TABLET BY MOUTH DAILY, Disp: 90 tablet, Rfl: 1    methocarbamol (ROBAXIN) 500 MG tablet, Take 1 tablet by mouth 2 (Two) Times a Day As Needed for Muscle Spasms., Disp: 60 tablet, Rfl: 5    metoprolol tartrate (LOPRESSOR) 25 MG tablet, TAKE 1 TABLET BY MOUTH EVERY 12 HOURS, Disp: 180 tablet, Rfl: 1    Omega-3 Fatty Acids (FISH OIL) 1000 MG capsule capsule, Take  by mouth Daily With Breakfast., Disp: , Rfl:     tamsulosin (FLOMAX) 0.4 MG capsule 24 hr capsule, Take 1 capsule by mouth Every Night., Disp: 90 capsule, Rfl: 1    traZODone (DESYREL) 50 MG tablet, TAKE 1 TO 2 TABLETS BY MOUTH EVERY NIGHT AS NEEDED FOR SLEEP, Disp: 180 tablet, Rfl: 1    HYDROcodone-acetaminophen (NORCO)  MG per tablet, Take 1 tablet by mouth Every 12 (Twelve) Hours As Needed for Moderate Pain., Disp: 60 tablet, Rfl: 0    [START ON 7/11/2024] HYDROcodone-acetaminophen (NORCO)  MG per tablet, Take 1 tablet by mouth Every 12 (Twelve) Hours As Needed for Moderate Pain., Disp: 60 tablet, Rfl: 0    Review of Systems   Constitutional:  Negative for fatigue.   Gastrointestinal:  Negative for constipation.   Musculoskeletal:  Positive for arthralgias, myalgias and neck pain.        Right shoulder pain       Vitals:    06/12/24 1435   BP: 156/96   Pulse: 64   Resp: 16   SpO2: 97%   Weight: 78.9 kg (174 lb)   PainSc:   6       Urine Drug Screen: 6/12/2024  Appropriate: Pending    Objective   Physical Exam  Vitals and nursing note reviewed.   Constitutional:       General: He is not in acute distress.     Appearance: Normal appearance. He is normal weight.   Neck:      Comments: Cervical spine exam:  1.  Cervical paraspinals tender to palpation bilaterally  2.  Cervical facet loading negative bilaterally  3.  Spurling equivocal on both the left and the right  4.   Significant tenderness in the trapezius bilaterally  Musculoskeletal:      Right shoulder: Tenderness and crepitus present. Decreased range of motion. Decreased strength.      Cervical back: Neck supple. Tenderness present.   Neurological:      Mental Status: He is alert.           Assessment & Plan   Problems Addressed this Visit    None  Visit Diagnoses       High risk medication use    -  Primary    Relevant Orders    Urine Drug Screen - Urine, Clean Catch    Displacement of cervical intervertebral disc without myelopathy        Relevant Medications    HYDROcodone-acetaminophen (NORCO)  MG per tablet    HYDROcodone-acetaminophen (NORCO)  MG per tablet (Start on 7/11/2024)    Chronic pain of both shoulders        Relevant Medications    HYDROcodone-acetaminophen (NORCO)  MG per tablet    HYDROcodone-acetaminophen (NORCO)  MG per tablet (Start on 7/11/2024)          Diagnoses         Codes Comments    High risk medication use    -  Primary ICD-10-CM: Z79.899  ICD-9-CM: V58.69     Displacement of cervical intervertebral disc without myelopathy     ICD-10-CM: M50.20  ICD-9-CM: 722.0     Chronic pain of both shoulders     ICD-10-CM: M25.511, G89.29, M25.512  ICD-9-CM: 719.41, 338.29             Plan:  Continue hydrocodone 10 mg twice daily  Can continue Robaxin as needed  Repeat UDS today.  Inspect appropriate  Recommend follow-up with orthopedic surgery regarding his limited range of motion in the right shoulder  --- Follow-up 3 months         INSPECT REPORT    As part of the patient's treatment plan, I may be prescribing controlled substances. The patient has been made aware of appropriate use of such medications, including potential risk of somnolence, limited ability to drive and/or work safely, and the potential for dependence or overdose. It has also bee made clear that these medications are for use by this patient only, without concomitant use of alcohol or other substances unless  prescribed.     Patient has completed prescribing agreement detailing terms of continued prescribing of controlled substances, including monitoring ESTEFANIA reports, urine drug screening, and pill counts if necessary. The patient is aware that inappropriate use will results in cessation of prescribing such medications.    INSPECT report has been reviewed and scanned into the patient's chart.    As the clinician, I personally reviewed the INSPECT from 6/11/2024.    History and physical exam exhibit continued safe and appropriate use of controlled substances.      EMR Dragon/Transcription disclaimer:   Much of this encounter note is an electronic transcription/translation of spoken language to printed text. The electronic translation of spoken language may permit erroneous, or at times, nonsensical words or phrases to be inadvertently transcribed; Although I have reviewed the note for such errors, some may still exist.

## 2024-06-15 NOTE — PROGRESS NOTES
Re-Assessment / Re-Certification        Patient: Chris Tinsley   : 1950  Diagnosis/ICD-10 Code:  Chronic right shoulder pain [M25.511, G89.29]  Referring practitioner: Bryce Hill  Date of Initial Visit: Type: THERAPY  Noted: 2024  Today's Date: 6/15/2024  Patient seen for 38 sessions      Subjective:   Chris Tinsley reports: no new issues today.  Reports still with persistent pain in his right shoulder though better than several months ago.  Reports still limited with lifting and reaching with his right arm.  Has consult with pain management this week per his wife.   Subjective Questionnaire: QuickDASH: 61%  Clinical Progress: improved  Home Program Compliance: Yes  Treatment has included: therapeutic exercise, neuromuscular re-education, manual therapy, therapeutic activity, electrical stimulation, dry needling, moist heat, and cryotherapy    Subjective   Objective          Active Range of Motion     Right Shoulder   Flexion: 65 degrees   External rotation 45°: 30 degrees with pain  Internal rotation 45°: 55 degrees     Passive Range of Motion     Right Shoulder   Flexion: 115 degrees   External rotation 45°: 40 degrees   Internal rotation 45°: 60 degrees     Strength/Myotome Testing     Right Shoulder     Planes of Motion   Flexion: 3-   External rotation at 0°: 4-   Internal rotation at 0°: 4+     Additional Strength Details  :  R: 33lbs.     L:  44 lbs.       Assessment & Plan       Assessment  Impairments: abnormal or restricted ROM, impaired physical strength and pain with function   Functional limitations: carrying objects, lifting, pulling, pushing, reaching behind back, reaching overhead and unable to perform repetitive tasks   Assessment details: Presents with continued but improved limits in R shoulder passive and active ROM, strength and ability for functional use.  Some decrease in R shoulder pain since beginning PT but still high levels especially with endranges of R shoulder ER and  lifting.  AROM and strength more limited than normal for this stage of rehab and recommended MD follow up to discuss and contributions of cervical spine or otherwise.   Prognosis: fair      Progress toward previous goals:  All STG met and all LTG progressed towards    New Goals  Short-term goals (STG):   1. R shoulder pain with ADL's decreased 20% or greater over 3 weeks. - Met  2. R shoulder PROM improved to 100 degrees elevation in scapular plane over 3 weeks. - Met  3. AROM R shoulder elevation w/o increased pain or compensation to 70 degrees FL or greater over 3 weeks. Met   4. Independent and compliant with HEP over 3 weeks - Met     Long-term goals (LTG):   1. Quick DASH for ADL's improved to 30% or less over 12 weeks. - Progressed towards  2. R shoulder strength all planes 4-4+/5 or greater w/o pain. - Progressed towards   3. R shoulder active and passive ROM WFL w/o pain over 12 weeks. - Progressed towards   4.  strength improved to 50 lbs B over 12 weeks. - Progressed towards   5. To verbalize readiness for discharge per improved pain/ROM and function in R shoulder within 12 weeks. - Progressed towards           Recommendations: Continue with recommendations to continue PT to meet LTG and prior function  Timeframe: 6 weeks  Prognosis to achieve goals: fair    PT Signature: Baljinder Hernandez, PT, DPT      Based upon review of the patient's progress and continued therapy plan, it is my medical opinion that Chris Tinsley should continue physical therapy treatment at [facility]  [address] [place] [address] [address] [place] [id number] [phone].    Signature: __________________________________  Bryce Hill    Timed:         Manual Therapy:    18     mins  22589;     Therapeutic Exercise:    10     mins  09238;     Therapeutic Activity:     10     mins  51491;           Timed Treatment:   38   mins   Total Treatment:     38   mins

## 2024-06-18 ENCOUNTER — TRANSCRIBE ORDERS (OUTPATIENT)
Dept: ADMINISTRATIVE | Facility: HOSPITAL | Age: 74
End: 2024-06-18
Payer: OTHER GOVERNMENT

## 2024-06-18 DIAGNOSIS — R10.9 ABDOMINAL PAIN, UNSPECIFIED ABDOMINAL LOCATION: Primary | ICD-10-CM

## 2024-06-18 RX ORDER — LISINOPRIL 10 MG/1
10 TABLET ORAL DAILY
Qty: 90 TABLET | Refills: 1 | Status: SHIPPED | OUTPATIENT
Start: 2024-06-18

## 2024-06-26 NOTE — PROGRESS NOTES
[facility]   [address], [address]  [place] [address] [phone]  Merna Layton, MSN, APRN, PMHNP-BC        Subjective     Chris Tinsley is a 74 y.o. male who presents today for follow up for psychiatric medication management.     Chief Complaint:  Depression, anxiety     History of Present Illness:     Medication adjustments last visit:   Continue trazodone 50-100mg nightly.     Accompanied to his follow up appointment today by his daughter.   He had prostate surgery, and still is having trouble getting up multiple times at night to urinate. He was doing ok sleeping in the recliner, but since he has moved to the bed, now he gets up a lot. They are not sure why.   He also is going to have to have another shoulder surgery. They are upset about this.   He also has a mass they have found in his abdomen, that he is pending an MRI for. They are obviously worried about these things, but she states he is still in good spirits.   Sleep has been poor, but he is getting up at night to urinate. He does state he is able to fall back asleep.   Denies any suicidal thoughts.       Patient presents with symptoms and behaviors that are consistent with the following DSM-5 diagnoses:  Major depressive disorder  2.  Generalized anxiety disorder  3. Insomnia     The following portions of the patient's history were reviewed and updated as appropriate: allergies, current medications, past family history, past medical history, past social history, past surgical history and problem list.    PAST OUTPATIENT TREATMENT  Diagnosis treated:  Affective Disorder, Anxiety/Panic Disorder  Treatment Type:  Medication Management  Prior Psychiatric Medications:  He tried one medication in the past but doesn't remember what it is.   Support Groups:  None  Sequelae Of Mental Disorder:  emotional distress    Interval History  New pt    Side Effects  None      Past Medical History:  Past Medical  History:   Diagnosis Date    Abnormal nuclear stress test 06/22/2019    Chronic deep vein thrombosis (DVT) of left lower extremity     BHATTI (nonalcoholic steatohepatitis) 06/2019    Unstable angina 06/22/2019    Vertigo        Social History:  Social History     Socioeconomic History    Marital status:    Tobacco Use    Smoking status: Never     Passive exposure: Never    Smokeless tobacco: Never   Vaping Use    Vaping status: Never Used   Substance and Sexual Activity    Alcohol use: Not Currently     Comment: quit 20 yrs ago    Drug use: No    Sexual activity: Defer       Family History:  Family History   Problem Relation Age of Onset    Heart disease Father        Past Surgical History:  Past Surgical History:   Procedure Laterality Date    CARDIAC CATHETERIZATION N/A 6/22/2019    Procedure: LEFT HEART CATH with possible PCI;  Surgeon: Enmanuel Wild MD;  Location: [facility] CATH INVASIVE LOCATION;  Service: Cardiovascular    COLON SURGERY      CORONARY ARTERY BYPASS GRAFT N/A 6/24/2019    Procedure: CABG;  Surgeon: Jose Angel López MD;  Location: [facility] CVOR;  Service: Cardiothoracic    LIVER BIOPSY  06/2019       Problem List:  Patient Active Problem List   Diagnosis    Benign essential HTN    Chronic pain    Osteoarthritis    Chronic deep vein thrombosis (DVT) of left lower extremity    Chronic insomnia    Dizziness    Shortness of breath    Coronary artery disease involving native coronary artery with unstable angina pectoris    S/P CABG (LIMA to LAD, SVG to PDA, SVG to lateral branch of ramus, SVG to OM1 --medial branch of ramus) per Dr. López 6/24/2019    Mixed hyperlipidemia    Family history of diabetes mellitus    Generalized anxiety disorder    Post-herpetic polyneuropathy    Proteinuria    Stage 3a chronic kidney disease    BHATTI (nonalcoholic steatohepatitis)       Allergy:   Allergies   Allergen Reactions    Ibuprofen Nausea And Vomiting        Discontinued Medications:  Medications  Discontinued During This Encounter   Medication Reason    traZODone (DESYREL) 50 MG tablet Reorder     Current Medications:   Current Outpatient Medications   Medication Sig Dispense Refill    traZODone (DESYREL) 100 MG tablet Take 1 tablet by mouth Every Night. 90 tablet 1    acyclovir (ZOVIRAX) 800 MG tablet TAKE 1 TABLET BY MOUTH 5 TIMES A DAY FOR 10 DAYS      aspirin 81 MG chewable tablet Chew 1 tablet Daily.      atorvastatin (LIPITOR) 40 MG tablet TAKE 1 TABLET BY MOUTH EVERY NIGHT 90 tablet 1    cholecalciferol (VITAMIN D3) 25 MCG (1000 UT) tablet Take 1 tablet by mouth Daily.      HYDROcodone-acetaminophen (NORCO)  MG per tablet Take 1 tablet by mouth Every 12 (Twelve) Hours As Needed for Moderate Pain. 60 tablet 0    [START ON 7/11/2024] HYDROcodone-acetaminophen (NORCO)  MG per tablet Take 1 tablet by mouth Every 12 (Twelve) Hours As Needed for Moderate Pain. 60 tablet 0    hydrocortisone 2.5 % cream Apply 1 application  topically to the appropriate area as directed 2 (Two) Times a Day. 30 g 1    lisinopril (PRINIVIL,ZESTRIL) 10 MG tablet TAKE 1 TABLET BY MOUTH DAILY 90 tablet 1    methocarbamol (ROBAXIN) 500 MG tablet Take 1 tablet by mouth 2 (Two) Times a Day As Needed for Muscle Spasms. 60 tablet 5    metoprolol tartrate (LOPRESSOR) 25 MG tablet TAKE 1 TABLET BY MOUTH EVERY 12 HOURS 180 tablet 1    Omega-3 Fatty Acids (FISH OIL) 1000 MG capsule capsule Take  by mouth Daily With Breakfast.      tamsulosin (FLOMAX) 0.4 MG capsule 24 hr capsule Take 1 capsule by mouth Every Night. 90 capsule 1     No current facility-administered medications for this visit.         Psychological ROS: positive for - anxiety, depression and irritability  negative for - behavioral disorder, concentration difficulties, decreased libido, disorientation, hallucinations, hostility, memory difficulties, mood swings, obsessive thoughts, physical abuse, sexual abuse, sleep disturbances or suicidal ideation      Physical  Exam:   There were no vitals taken for this visit.    MENTAL STATUS EXAM   General Appearance:  Cleanly groomed and dressed  Eye Contact:  Good eye contact  Attitude:  Cooperative  Motor Activity:  Normal gait, posture  Muscle Strength:  Normal  Speech:  Normal rate, tone, volume  Language:  Spontaneous  Hopelessness:  Denies  Loneliness: Denies  Thought Process:  Logical and goal-directed  Associations/ Thought Content:  No delusions  Hallucinations:  None  Suicidal Ideations:  Not present  Homicidal Ideation:  Not present  Sensorium:  Alert  Orientation:  Person, place, time and situation  Immediate Recall, Recent, and Remote Memory:  Intact  Attention Span/ Concentration:  Good  Fund of Knowledge:  Appropriate for age and educational level  Intellectual Functioning:  Average range  Insight:  Good  Judgement:  Good  Reliability:  Good  Impulse Control:  Good       PHQ-9 Depression Screening    Little interest or pleasure in doing things? 2-->more than half the days   Feeling down, depressed, or hopeless? 2-->more than half the days   Trouble falling or staying asleep, or sleeping too much? 3-->nearly every day   Feeling tired or having little energy? 2-->more than half the days   Poor appetite or overeating? 2-->more than half the days   Feeling bad about yourself - or that you are a failure or have let yourself or your family down? 2-->more than half the days   Trouble concentrating on things, such as reading the newspaper or watching television? 0-->not at all   Moving or speaking so slowly that other people could have noticed? Or the opposite - being so fidgety or restless that you have been moving around a lot more than usual? 2-->more than half the days   Thoughts that you would be better off dead, or of hurting yourself in some way? 0-->not at all   PHQ-9 Total Score 15   If you checked off any problems, how difficult have these problems made it for you to do your work, take care of things at home, or get  along with other people? somewhat difficult        Never smoker    I advised Chris of the risks of tobacco use.     Result Review:    Labs:  No visits with results within 3 Month(s) from this visit.   Latest known visit with results is:   Lab Requisition on 03/05/2024   Component Date Value Ref Range Status    Case Report 03/05/2024    Final                    Value:Surgical Pathology Report                         Case: [id number]                                  Authorizing Provider:  Jeevan Koenig,  Collected:           03/05/2024 02:54 PM                                 MD                                                                           Ordering Location:     [facility]       Received:            03/06/2024 10:02 AM                                 LABORATORY                                                                   Pathologist:           Félix Blackwell MD                                                             Specimen:    Prostate                                                                                   Final Diagnosis 03/05/2024    Final                    Value:This result contains rich text formatting which cannot be displayed here.    Gross Description 03/05/2024    Final                    Value:This result contains rich text formatting which cannot be displayed here.       Assessment & Plan   Diagnoses and all orders for this visit:    1. Insomnia due to mental condition (Primary)  -     traZODone (DESYREL) 100 MG tablet; Take 1 tablet by mouth Every Night.  Dispense: 90 tablet; Refill: 1      Continue trazodone 100mg nightly.     Visit Diagnoses:    ICD-10-CM ICD-9-CM   1. Insomnia due to mental condition  F51.05 300.9     327.02       TREATMENT PLAN/GOALS: Continue supportive psychotherapy efforts and medications as indicated. Treatment and medication options discussed during today's visit. Patient ackowledged and verbally consented to continue with  current treatment plan and was educated on the importance of compliance with treatment and follow-up appointments.    CRISIS RESOURCES:    In the event you have personal crisis, there are several resources to reach someone to talk with:    988 Suicide and Crisis Lifeline  Call or text 986 or chat 983lifeline.org  [facility]'s National Helpline  8-614-982-HELP (4357)  Text your zip code to 427[address] (HELP4U)  [place] Crisis Line  Dial 400, then press 1  Text 424444    MEDICATION ISSUES:  INSPECT reviewed as expected    Discussed medication options and treatment plan of prescribed medication as well as the risks, benefits, and side effects including potential falls, possible impaired driving and metabolic adversities among others. Patient is agreeable to call the office with any worsening of symptoms or onset of side effects. Patient is agreeable to call 911 or go to the nearest ER should he/she begin having SI/HI. No medication side effects or related complaints today.     MEDS ORDERED DURING VISIT:  New Medications Ordered This Visit   Medications    traZODone (DESYREL) 100 MG tablet     Sig: Take 1 tablet by mouth Every Night.     Dispense:  90 tablet     Refill:  1       Return in about 3 months (around 9/27/2024).         This document has been electronically signed by TORI Mckeon  June 27, 2024 11:19 EDT    Part of this note may be an electronic transcription/translation of spoken language to printed text using the Dragon Dictation System.

## 2024-06-27 ENCOUNTER — OFFICE VISIT (OUTPATIENT)
Dept: PSYCHIATRY | Facility: CLINIC | Age: 74
End: 2024-06-27
Payer: MEDICARE

## 2024-06-27 DIAGNOSIS — F51.05 INSOMNIA DUE TO MENTAL CONDITION: Primary | Chronic | ICD-10-CM

## 2024-06-27 RX ORDER — TRAZODONE HYDROCHLORIDE 100 MG/1
100 TABLET ORAL NIGHTLY
Qty: 90 TABLET | Refills: 1 | Status: SHIPPED | OUTPATIENT
Start: 2024-06-27

## 2024-07-06 ENCOUNTER — HOSPITAL ENCOUNTER (OUTPATIENT)
Dept: MRI IMAGING | Facility: HOSPITAL | Age: 74
Discharge: HOME | End: 2024-07-06
Admitting: STUDENT IN AN ORGANIZED HEALTH CARE EDUCATION/TRAINING PROGRAM
Payer: OTHER GOVERNMENT

## 2024-07-06 DIAGNOSIS — R10.9 ABDOMINAL PAIN, UNSPECIFIED ABDOMINAL LOCATION: ICD-10-CM

## 2024-07-06 LAB
CREAT BLDA-MCNC: 1.6 MG/DL (ref 0.6–1.3)
EGFRCR SERPLBLD CKD-EPI 2021: 45.5 ML/MIN/1.73

## 2024-07-06 PROCEDURE — A9579 GAD-BASE MR CONTRAST NOS,1ML: HCPCS | Performed by: STUDENT IN AN ORGANIZED HEALTH CARE EDUCATION/TRAINING PROGRAM

## 2024-07-06 PROCEDURE — 25010000002 GADOTERIDOL PER 1 ML: Performed by: STUDENT IN AN ORGANIZED HEALTH CARE EDUCATION/TRAINING PROGRAM

## 2024-07-06 PROCEDURE — 82565 ASSAY OF CREATININE: CPT

## 2024-07-06 PROCEDURE — 74183 MRI ABD W/O CNTR FLWD CNTR: CPT

## 2024-07-06 RX ADMIN — GADOTERIDOL 20 ML: 279.3 INJECTION, SOLUTION INTRAVENOUS at 09:56

## 2024-08-09 ENCOUNTER — OFFICE VISIT (OUTPATIENT)
Dept: PAIN MEDICINE | Facility: CLINIC | Age: 74
End: 2024-08-09
Payer: MEDICARE

## 2024-08-09 VITALS
HEART RATE: 64 BPM | RESPIRATION RATE: 16 BRPM | DIASTOLIC BLOOD PRESSURE: 97 MMHG | OXYGEN SATURATION: 97 % | SYSTOLIC BLOOD PRESSURE: 149 MMHG

## 2024-08-09 DIAGNOSIS — M25.511 CHRONIC PAIN OF BOTH SHOULDERS: ICD-10-CM

## 2024-08-09 DIAGNOSIS — M25.512 CHRONIC PAIN OF BOTH SHOULDERS: ICD-10-CM

## 2024-08-09 DIAGNOSIS — M50.20 DISPLACEMENT OF CERVICAL INTERVERTEBRAL DISC WITHOUT MYELOPATHY: ICD-10-CM

## 2024-08-09 DIAGNOSIS — G89.29 CHRONIC PAIN OF BOTH SHOULDERS: ICD-10-CM

## 2024-08-09 PROCEDURE — 1159F MED LIST DOCD IN RCRD: CPT | Performed by: STUDENT IN AN ORGANIZED HEALTH CARE EDUCATION/TRAINING PROGRAM

## 2024-08-09 PROCEDURE — 1160F RVW MEDS BY RX/DR IN RCRD: CPT | Performed by: STUDENT IN AN ORGANIZED HEALTH CARE EDUCATION/TRAINING PROGRAM

## 2024-08-09 PROCEDURE — G0463 HOSPITAL OUTPT CLINIC VISIT: HCPCS | Performed by: STUDENT IN AN ORGANIZED HEALTH CARE EDUCATION/TRAINING PROGRAM

## 2024-08-09 PROCEDURE — 99214 OFFICE O/P EST MOD 30 MIN: CPT | Performed by: STUDENT IN AN ORGANIZED HEALTH CARE EDUCATION/TRAINING PROGRAM

## 2024-08-09 PROCEDURE — 1125F AMNT PAIN NOTED PAIN PRSNT: CPT | Performed by: STUDENT IN AN ORGANIZED HEALTH CARE EDUCATION/TRAINING PROGRAM

## 2024-08-09 PROCEDURE — 3080F DIAST BP >= 90 MM HG: CPT | Performed by: STUDENT IN AN ORGANIZED HEALTH CARE EDUCATION/TRAINING PROGRAM

## 2024-08-09 PROCEDURE — 3077F SYST BP >= 140 MM HG: CPT | Performed by: STUDENT IN AN ORGANIZED HEALTH CARE EDUCATION/TRAINING PROGRAM

## 2024-08-09 RX ORDER — HYDROCODONE BITARTRATE AND ACETAMINOPHEN 10; 325 MG/1; MG/1
1 TABLET ORAL EVERY 12 HOURS PRN
Qty: 60 TABLET | Refills: 0 | Status: SHIPPED | OUTPATIENT
Start: 2024-10-04

## 2024-08-09 RX ORDER — HYDROCODONE BITARTRATE AND ACETAMINOPHEN 10; 325 MG/1; MG/1
1 TABLET ORAL EVERY 12 HOURS PRN
Qty: 60 TABLET | Refills: 0 | Status: SHIPPED | OUTPATIENT
Start: 2024-09-06

## 2024-08-09 RX ORDER — HYDROCODONE BITARTRATE AND ACETAMINOPHEN 10; 325 MG/1; MG/1
1 TABLET ORAL EVERY 12 HOURS PRN
Qty: 60 TABLET | Refills: 0 | Status: SHIPPED | OUTPATIENT
Start: 2024-11-01

## 2024-08-09 NOTE — PROGRESS NOTES
CHIEF COMPLAINT  Chief Complaint   Patient presents with    Pain     LD Hydrocodone @ PM 8/9/24        Primary Care  Nichole Paul, TORI    Subjective   Chris Tinsley is a 74 y.o. male  who presents for right shoulder pain medication management.  He states that approximately 6 months ago, he sustained a fall off a ladder and subsequently fractured the head of the humerus.  He also sustained a rotator cuff injury resulting in biceps tendinopathy as well as adhesive capsulitis.  He is currently being treated conservatively with his orthopedic surgeon, but continues to require pain medication.  He is currently working with physical therapy as well as exercising several times a day at home.    Neck Pain     Pain  Associated symptoms include arthralgias, myalgias and neck pain. Pertinent negatives include no fatigue.        Location: Right shoulder  Onset: 6 months ago  Duration: Improving  Timing: Constant throughout the day  Quality: Sharp, stabbing  Severity: Today: 3       Last Week: 3       Worst: 7  Modifying Factors: The pain is worse with exercising and working with physical therapy    Physical Therapy: yes    Interval Update 08/09/2024: Continues with significant shoulder pain.  Pending a repeat orthopedic evaluation.  Otherwise, doing somewhat better with hydrocodone twice daily.    The following portions of the patient's history were reviewed and updated as appropriate: allergies, current medications, past family history, past medical history, past social history, past surgical history and problem list.    Procedures:  4/6/2023: Cervical epidural: 0% benefit      Current Outpatient Medications:     acyclovir (ZOVIRAX) 800 MG tablet, TAKE 1 TABLET BY MOUTH 5 TIMES A DAY FOR 10 DAYS, Disp: , Rfl:     aspirin 81 MG chewable tablet, Chew 1 tablet Daily., Disp: , Rfl:     atorvastatin (LIPITOR) 40 MG tablet, TAKE 1 TABLET BY MOUTH EVERY NIGHT, Disp: 90 tablet, Rfl: 1    CeleBREX 200 MG capsule, Take 1 capsule  by mouth., Disp: , Rfl:     cholecalciferol (VITAMIN D3) 25 MCG (1000 UT) tablet, Take 1 tablet by mouth Daily., Disp: , Rfl:     [START ON 10/4/2024] HYDROcodone-acetaminophen (NORCO)  MG per tablet, Take 1 tablet by mouth Every 12 (Twelve) Hours As Needed for Moderate Pain., Disp: 60 tablet, Rfl: 0    [START ON 11/1/2024] HYDROcodone-acetaminophen (NORCO)  MG per tablet, Take 1 tablet by mouth Every 12 (Twelve) Hours As Needed for Moderate Pain., Disp: 60 tablet, Rfl: 0    hydrocortisone 2.5 % cream, Apply 1 application  topically to the appropriate area as directed 2 (Two) Times a Day., Disp: 30 g, Rfl: 1    lisinopril (PRINIVIL,ZESTRIL) 10 MG tablet, TAKE 1 TABLET BY MOUTH DAILY, Disp: 90 tablet, Rfl: 1    methocarbamol (ROBAXIN) 500 MG tablet, Take 1 tablet by mouth 2 (Two) Times a Day As Needed for Muscle Spasms., Disp: 60 tablet, Rfl: 5    metoprolol tartrate (LOPRESSOR) 25 MG tablet, TAKE 1 TABLET BY MOUTH EVERY 12 HOURS, Disp: 180 tablet, Rfl: 1    Omega-3 Fatty Acids (FISH OIL) 1000 MG capsule capsule, Take  by mouth Daily With Breakfast., Disp: , Rfl:     tamsulosin (FLOMAX) 0.4 MG capsule 24 hr capsule, Take 1 capsule by mouth Every Night., Disp: 90 capsule, Rfl: 1    traZODone (DESYREL) 100 MG tablet, Take 1 tablet by mouth Every Night., Disp: 90 tablet, Rfl: 1    [START ON 9/6/2024] HYDROcodone-acetaminophen (NORCO)  MG per tablet, Take 1 tablet by mouth Every 12 (Twelve) Hours As Needed for Moderate Pain., Disp: 60 tablet, Rfl: 0    Review of Systems   Constitutional:  Negative for fatigue.   Gastrointestinal:  Negative for constipation.   Musculoskeletal:  Positive for arthralgias, myalgias and neck pain.        Right shoulder pain       Vitals:    08/09/24 1350   BP: 149/97   Pulse: 64   Resp: 16   SpO2: 97%   PainSc:   5       Urine Drug Screen: 6/12/2024  Appropriate: Yes    Objective   Physical Exam  Vitals and nursing note reviewed.   Constitutional:       General: He is not in  acute distress.     Appearance: Normal appearance. He is normal weight.   Neck:      Comments: Cervical spine exam:  1.  Cervical paraspinals tender to palpation bilaterally  2.  Cervical facet loading negative bilaterally  3.  Spurling equivocal on both the left and the right  4.  Significant tenderness in the trapezius bilaterally  Musculoskeletal:      Right shoulder: Tenderness and crepitus present. Decreased range of motion. Decreased strength.      Cervical back: Neck supple. Tenderness present.   Neurological:      Mental Status: He is alert.           Assessment & Plan   Problems Addressed this Visit    None  Visit Diagnoses       Displacement of cervical intervertebral disc without myelopathy        Relevant Medications    HYDROcodone-acetaminophen (NORCO)  MG per tablet (Start on 10/4/2024)    HYDROcodone-acetaminophen (NORCO)  MG per tablet (Start on 11/1/2024)    HYDROcodone-acetaminophen (NORCO)  MG per tablet (Start on 9/6/2024)    Chronic pain of both shoulders        Relevant Medications    HYDROcodone-acetaminophen (NORCO)  MG per tablet (Start on 10/4/2024)    HYDROcodone-acetaminophen (NORCO)  MG per tablet (Start on 11/1/2024)    HYDROcodone-acetaminophen (NORCO)  MG per tablet (Start on 9/6/2024)          Diagnoses         Codes Comments    Displacement of cervical intervertebral disc without myelopathy     ICD-10-CM: M50.20  ICD-9-CM: 722.0     Chronic pain of both shoulders     ICD-10-CM: M25.511, G89.29, M25.512  ICD-9-CM: 719.41, 338.29             Plan:  Continue hydrocodone 10 mg twice daily  Can continue Robaxin as needed  UDS inspect appropriate  Recommend follow-up with orthopedic surgery regarding his limited range of motion in the right shoulder  --- Follow-up 3 months         INSPECT REPORT    As part of the patient's treatment plan, I may be prescribing controlled substances. The patient has been made aware of appropriate use of such medications,  including potential risk of somnolence, limited ability to drive and/or work safely, and the potential for dependence or overdose. It has also bee made clear that these medications are for use by this patient only, without concomitant use of alcohol or other substances unless prescribed.     Patient has completed prescribing agreement detailing terms of continued prescribing of controlled substances, including monitoring ESTEFANIA reports, urine drug screening, and pill counts if necessary. The patient is aware that inappropriate use will results in cessation of prescribing such medications.    INSPECT report has been reviewed and scanned into the patient's chart.    As the clinician, I personally reviewed the INSPECT from 8/7/2024.    History and physical exam exhibit continued safe and appropriate use of controlled substances.      EMR Dragon/Transcription disclaimer:   Much of this encounter note is an electronic transcription/translation of spoken language to printed text. The electronic translation of spoken language may permit erroneous, or at times, nonsensical words or phrases to be inadvertently transcribed; Although I have reviewed the note for such errors, some may still exist.

## 2024-09-05 ENCOUNTER — TRANSCRIBE ORDERS (OUTPATIENT)
Dept: ADMINISTRATIVE | Facility: HOSPITAL | Age: 74
End: 2024-09-05
Payer: OTHER GOVERNMENT

## 2024-09-05 DIAGNOSIS — Z96.619 PAIN DUE TO SHOULDER JOINT PROSTHESIS, SEQUELA: Primary | ICD-10-CM

## 2024-09-05 DIAGNOSIS — T84.84XS PAIN DUE TO SHOULDER JOINT PROSTHESIS, SEQUELA: Primary | ICD-10-CM

## 2024-09-12 ENCOUNTER — HOSPITAL ENCOUNTER (OUTPATIENT)
Dept: PET IMAGING | Facility: HOSPITAL | Age: 74
Discharge: HOME | End: 2024-09-12
Admitting: ORTHOPAEDIC SURGERY
Payer: OTHER GOVERNMENT

## 2024-09-12 DIAGNOSIS — Z96.619 PAIN DUE TO SHOULDER JOINT PROSTHESIS, SEQUELA: ICD-10-CM

## 2024-09-12 DIAGNOSIS — T84.84XS PAIN DUE TO SHOULDER JOINT PROSTHESIS, SEQUELA: ICD-10-CM

## 2024-09-12 PROCEDURE — 73200 CT UPPER EXTREMITY W/O DYE: CPT

## 2024-09-18 RX ORDER — ATORVASTATIN CALCIUM 40 MG/1
TABLET, FILM COATED ORAL
Qty: 90 TABLET | Refills: 0 | Status: SHIPPED | OUTPATIENT
Start: 2024-09-18

## 2024-09-26 ENCOUNTER — OFFICE VISIT (OUTPATIENT)
Dept: PSYCHIATRY | Facility: CLINIC | Age: 74
End: 2024-09-26
Payer: MEDICARE

## 2024-09-26 DIAGNOSIS — F51.05 INSOMNIA DUE TO MENTAL CONDITION: Primary | Chronic | ICD-10-CM

## 2024-10-09 ENCOUNTER — OFFICE VISIT (OUTPATIENT)
Dept: CARDIOLOGY | Facility: CLINIC | Age: 74
End: 2024-10-09
Payer: MEDICARE

## 2024-10-09 VITALS
DIASTOLIC BLOOD PRESSURE: 66 MMHG | BODY MASS INDEX: 22.73 KG/M2 | OXYGEN SATURATION: 99 % | HEIGHT: 68 IN | HEART RATE: 74 BPM | SYSTOLIC BLOOD PRESSURE: 101 MMHG | WEIGHT: 150 LBS

## 2024-10-09 DIAGNOSIS — I25.110 CORONARY ARTERY DISEASE INVOLVING NATIVE CORONARY ARTERY OF NATIVE HEART WITH UNSTABLE ANGINA PECTORIS: ICD-10-CM

## 2024-10-09 DIAGNOSIS — Z01.810 PREOP CARDIOVASCULAR EXAM: ICD-10-CM

## 2024-10-09 DIAGNOSIS — I10 BENIGN ESSENTIAL HTN: Chronic | ICD-10-CM

## 2024-10-09 DIAGNOSIS — E78.2 MIXED HYPERLIPIDEMIA: ICD-10-CM

## 2024-10-09 DIAGNOSIS — Z95.1 S/P CABG (CORONARY ARTERY BYPASS GRAFT): Primary | ICD-10-CM

## 2024-10-09 NOTE — PROGRESS NOTES
Cardiology Office Visit      Encounter Date:  10/09/2024    Patient ID:   Chris Tinsley is a 74 y.o. male.    Reason For Followup:  Coronary artery disease  Hypertension  Hyperlipidemia  Chronic renal insufficiency    Brief Clinical History:  Dear Dr. Peter, Dwain Bello MD    I had the pleasure of seeing Chris Tinsley today. As you are well aware, this is a 74 y.o. male with a past medical history that is significant for history of hypertension hyperlipidemia history of known coronary artery disease here for follow-up       Interval History:  Denies any new cardiac symptoms  Denies any exertional symptoms of chest pain shortness of breath dizziness or syncope  Compliant with medical therapy  Currently being evaluated for possible shoulder surgery    Assessment & Plan    Impressions:  Multivessel coronary artery disease status post coronary artery bypass surgery/2019  Normal LV systolic function  Coronary artery disease  Mild cardiomyopathy with LV ejection fraction of 50%  Hypertension  Hyperipidemia  Acute on chronic renal insufficiency creatinine is back to baseline/recent labs with a stable creatinine  Normal myocardial perfusion study in January 2023    Recommendations:  From cardiac standpoint patient is low risk to undergo the planned surgical procedure  Continue aggressive risk factor modification  Medications reviewed with the patient  Continue current medical therapy  Recent labs reviewed and discussed with patient   Test results and labs discussed with the patient  Recent labs work-up and stress test results during the hospital visit reviewed and discussed with patient  Continue follow-up with nephrology for chronic renal insufficiency  Continue current medical therapy with fish oil supplements  Lipitor 40 mg p.o. once a day aspirin 81 mg p.o. once a day lisinopril 10 mg p.o. once a day  Prior medical records reviewed and discussed with patient  Continue current medical therapy  Recent labs and  "workup reviewed and discussed with patient  Continued aggressive risk factor modification  Close monitoring of blood pressure at home  Recent labs and work-up reviewed and discussed with patient  Follow-up in office in 6 months        Vitals:  Vitals:    10/09/24 1129   BP: 101/66   Pulse: 74   SpO2: 99%   Weight: 68 kg (150 lb)   Height: 172.7 cm (68\")       Physical Exam:    General: Alert, cooperative, no distress, appears stated age  Head:  Normocephalic, atraumatic, mucous membranes moist  Eyes:  Conjunctiva/corneas clear, EOM's intact     Neck:  Supple,  no adenopathy;      Lungs: Clear to auscultation bilaterally, no wheezes rhonchi rales are noted  Chest wall: No tenderness  Heart::  Regular rate and rhythm, S1 and S2 normal, no murmur, rub or gallop  Abdomen: Soft, non-tender, nondistended bowel sounds active  Extremities: No cyanosis, clubbing, or edema  Pulses: 2+ and symmetric all extremities  Skin:  No rashes or lesions  Neuro/psych: A&O x3. CN II through XII are grossly intact with appropriate affect              Lab Results   Component Value Date    GLUCOSE 113 (H) 08/08/2023    BUN 34 (H) 08/08/2023    CREATININE 1.60 (H) 07/06/2024    EGFR 45.5 (L) 07/06/2024    BCR 24.6 08/08/2023    K 4.1 08/08/2023    CO2 24.0 08/08/2023    CALCIUM 9.9 08/08/2023    ALBUMIN 4.5 08/08/2023    BILITOT 0.5 01/04/2023    AST 16 01/04/2023    ALT 16 01/04/2023     Results for orders placed during the hospital encounter of 06/20/19    Adult Transthoracic Echo Complete W/ Cont if Necessary Per Protocol    Interpretation Summary  · The left ventricular cavity is mildly dilated.  · Left ventricular wall thickness is consistent with mild-to-moderate concentric hypertrophy.  · Left ventricular systolic function is low normal.  · Left ventricular diastolic dysfunction (grade I a) consistent with impaired relaxation.  · Left atrial cavity size is mild-to-moderately dilated.    Mildly dilated left ventricle with mild to " moderate concentric left ventricle hypertrophy with diastolic dysfunction with overall EF of 50%  Mild to moderate biatrial enlargement  No effusion  Trace tricuspid regurgitation without pulmonary hypertension     Results for orders placed during the hospital encounter of 06/20/19    Cardiac Catheterization/Vascular Study    Narrative  CARDIAC CATHETERIZATION REPORT    DATE OF PROCEDURE: 6/22/2019    INDICATION FOR PROCEDURE:    Unstable angina  Abnormal stress test with large area of inducible ischemia    PROCEDURE PERFORMED:  1.Left heart catheterization  2. coronary angiography  3. left ventriculography    PROCEDURE COMMENTS:    Informed consent was obtained from the patient after explaining risks and benefits.  Patient was brought to the cardiac interventional artery and placed on the table in the usual fashion.  Right groin was shaved and prepped in sterile fashion.  2% again was used with midsternal anesthesia to the right groin.  A total of 20 cc was used.  A 6 [place] sheath was placed in the right femoral artery.  A 6 [place] pigtail catheter, 6 [place] JR4 catheter and a 6 [place] JL4 catheter was used for the procedure.  After the cardiac catheterization is complete, all the catheters and sheath were removed.  Patient tolerated the procedure very well.  No complications noted.    FINDINGS:    1. HEMODYNAMICS:  , ventricular end-diastolic pressure 14-18, /84 with a mean pressure 111.    2. LEFT VENTRICULOGRAPHY: EF 50%, mild to moderate inferior wall hypokinesis , 1-2+ mitral regurgitation.    3. CORONARY ANGIOGRAPHY:    Left main: Normal    LAD: Proximal angiographic 70 to 80% stenosis.  Mid to distal focal area of 70% stenosis.  First diagonal branch is a moderate sized vessel with diffuse proximal 70% stenosis.    Left circumflex artery: 100% occlusion in the midportion with left to left collaterals filling the OM branch.    RCA: 100% occlusion in the proximal portion with left-to-right  collaterals.    SUMMARY:  Severe three-vessel coronary artery disease.  100% occlusion of the circumflex and 100% occlusion of the right coronary artery with left-to-right and left to left collaterals  Significant stenosis involving the LAD and diagonal branches  Mild LV systolic dysfunction with significant hypokinesis involving the basal inferior wall with an estimated LV contraction of 50%  1-2+ mitral regurgitation    RECOMMENDATIONS:  Surgical evaluation for consideration for a coronary artery bypass surgery  Echocardiogram to assess the LV systolic function and also rule out any significant valve pathology  Observe patient in PCU bed  Optimize medical therapy  Test results discussed the patient and family        Enmanuel Wild MD  6/22/2019  10:49 AM     Lab Results   Component Value Date    CHOL 119 08/08/2023    TRIG 144 08/08/2023    HDL 43 08/08/2023    LDL 51 08/08/2023      Results for orders placed during the hospital encounter of 01/03/23    Stress Test With Myocardial Perfusion One Day    Interpretation Summary    Myocardial perfusion study shows moderate size severe intensity mostly fixed perfusion defect involving the basal inferolateral wall suggestive of prior myocardial infarction with no significant reversible ischemia    Left ventricular ejection fraction is normal (Calculated EF = 60%).    Clinical correlation is recommended   Results for orders placed during the hospital encounter of 06/20/19    SCANNED - CARDIOLOGY           Objective:          Allergies:  Allergies   Allergen Reactions    Ibuprofen Nausea And Vomiting       Medication Review:     Current Outpatient Medications:     acyclovir (ZOVIRAX) 800 MG tablet, TAKE 1 TABLET BY MOUTH 5 TIMES A DAY FOR 10 DAYS, Disp: , Rfl:     aspirin 81 MG chewable tablet, Chew 1 tablet Daily., Disp: , Rfl:     atorvastatin (LIPITOR) 40 MG tablet, TAKE 1 TABLET BY MOUTH EVERY NIGHT, Disp: 90 tablet, Rfl: 0    CeleBREX 200 MG capsule, Take 1  capsule by mouth., Disp: , Rfl:     cholecalciferol (VITAMIN D3) 25 MCG (1000 UT) tablet, Take 1 tablet by mouth Daily., Disp: , Rfl:     HYDROcodone-acetaminophen (NORCO)  MG per tablet, Take 1 tablet by mouth Every 12 (Twelve) Hours As Needed for Moderate Pain., Disp: 60 tablet, Rfl: 0    [START ON 11/1/2024] HYDROcodone-acetaminophen (NORCO)  MG per tablet, Take 1 tablet by mouth Every 12 (Twelve) Hours As Needed for Moderate Pain., Disp: 60 tablet, Rfl: 0    hydrocortisone 2.5 % cream, Apply 1 application  topically to the appropriate area as directed 2 (Two) Times a Day., Disp: 30 g, Rfl: 1    lisinopril (PRINIVIL,ZESTRIL) 10 MG tablet, TAKE 1 TABLET BY MOUTH DAILY, Disp: 90 tablet, Rfl: 1    Omega-3 Fatty Acids (FISH OIL) 1000 MG capsule capsule, Take  by mouth Daily With Breakfast., Disp: , Rfl:     tamsulosin (FLOMAX) 0.4 MG capsule 24 hr capsule, Take 1 capsule by mouth Every Night., Disp: 90 capsule, Rfl: 1    traZODone (DESYREL) 100 MG tablet, Take 1 tablet by mouth Every Night., Disp: 90 tablet, Rfl: 1    Family History:  Family History   Problem Relation Age of Onset    Heart disease Father        Past Medical History:  Past Medical History:   Diagnosis Date    Abnormal nuclear stress test 06/22/2019    Chronic deep vein thrombosis (DVT) of left lower extremity     BHATTI (nonalcoholic steatohepatitis) 06/2019    Unstable angina 06/22/2019    Vertigo        Past surgical History:  Past Surgical History:   Procedure Laterality Date    CARDIAC CATHETERIZATION N/A 6/22/2019    Procedure: LEFT HEART CATH with possible PCI;  Surgeon: Enmanuel Wild MD;  Location: [facility] CATH INVASIVE LOCATION;  Service: Cardiovascular    COLON SURGERY      CORONARY ARTERY BYPASS GRAFT N/A 6/24/2019    Procedure: CABG;  Surgeon: Jose Angel López MD;  Location: [facility] CVOR;  Service: Cardiothoracic    LIVER BIOPSY  06/2019       Social History:  Social History     Socioeconomic History    Marital status:     Tobacco Use    Smoking status: Never     Passive exposure: Never    Smokeless tobacco: Never   Vaping Use    Vaping status: Never Used   Substance and Sexual Activity    Alcohol use: Not Currently     Comment: quit 20 yrs ago    Drug use: No    Sexual activity: Defer       Review of Systems:  The following systems were reviewed as they relate to the cardiovascular system: Constitutional, Eyes, ENT, Cardiovascular, Respiratory, Gastrointestinal, Integumentary, Neurological, Psychiatric, Hematologic, Endocrine, Musculoskeletal, and Genitourinary. The pertinent cardiovascular findings are reported above with all other cardiovascular points within those systems being negative.    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

## 2024-10-21 ENCOUNTER — TELEPHONE (OUTPATIENT)
Dept: PAIN MEDICINE | Facility: CLINIC | Age: 74
End: 2024-10-21
Payer: OTHER GOVERNMENT

## 2024-10-21 NOTE — TELEPHONE ENCOUNTER
Caller: EDYTA LAURA    Relationship: Emergency Contact    Best call back number: [phone]    What orders are you requesting (i.e. lab or imaging): PHYSICAL THERAPY    In what timeframe would the patient need to come in: ASAP    Where will you receive your lab/imaging services: [facility], ATTN: MOHSEN    AND SPOUSE SAYS THANK YOU

## 2024-10-22 DIAGNOSIS — M25.512 CHRONIC PAIN OF BOTH SHOULDERS: Primary | ICD-10-CM

## 2024-10-22 DIAGNOSIS — M25.511 CHRONIC PAIN OF BOTH SHOULDERS: Primary | ICD-10-CM

## 2024-10-22 DIAGNOSIS — G89.29 CHRONIC PAIN OF BOTH SHOULDERS: Primary | ICD-10-CM

## 2024-11-04 ENCOUNTER — TREATMENT (OUTPATIENT)
Dept: PHYSICAL THERAPY | Facility: CLINIC | Age: 74
End: 2024-11-04
Payer: MEDICARE

## 2024-11-04 DIAGNOSIS — R26.9 GAIT DISTURBANCE: ICD-10-CM

## 2024-11-04 DIAGNOSIS — M25.512 CHRONIC PAIN OF BOTH SHOULDERS: Primary | ICD-10-CM

## 2024-11-04 DIAGNOSIS — G89.29 CHRONIC PAIN OF BOTH SHOULDERS: Primary | ICD-10-CM

## 2024-11-04 DIAGNOSIS — R29.898 WEAKNESS OF BOTH LOWER EXTREMITIES: ICD-10-CM

## 2024-11-04 DIAGNOSIS — M25.511 CHRONIC PAIN OF BOTH SHOULDERS: Primary | ICD-10-CM

## 2024-11-04 NOTE — PROGRESS NOTES
Physical Therapy Initial Evaluation and Plan of Care    Patient: Chris Tinsley   : 1950  Diagnosis/ICD-10 Code:  Chronic pain of both shoulders [M25.511, G89.29, M25.512]  Referring practitioner: Piter Gill MD  Date of initial visit : 2024  Today's Date: 2024  Patient seen for 1  session    Visit Diagnoses:    ICD-10-CM ICD-9-CM   1. Chronic pain of both shoulders  M25.511 719.41    G89.29 338.29    M25.512    2. Gait disturbance  R26.9 781.2   3. Weakness of both lower extremities  R29.898 729.89        Subjective Evaluation    History of Present Illness  Mechanism of injury: Patient is a 74 year old male who presents with a diagnosis of bilateral shoulder pain.   Reports continued pain in both shoulders with history of reverse total shoulder in 2023.  Had significant limits and pain in his right shoulder after surgery with some improvement with PT but plateaued over time.  Has CT scan on of right shoulder with joint replacement intact per report and plan for exploratory surgery 24 on his right shoulder. Also notes recent falls and decreased ability to walk in home. Goals for decreased pain and increased ability to use both his right and left arm without pain.      HR: 67  O2 saturation: 97 %  BP: 98/62 mmHG       Subjective comment: Quick DASH - 91%  Patient Occupation: Retired Pain  Current pain ratin  At best pain ratin  At worst pain ratin  Pain location: Bilateral shoulders.  Quality: Aching, stabbing.  Alleviating factors: Laying supine with his right arm by his side.  Aggravating factors: outstretched reach, lifting, overhead activity and movement  Progression: worsening    Social Support  Lives in: multiple-level home  Lives with: spouse    Hand dominance: right    Diagnostic Tests  Abnormal CT scan: No acute abnormality per report.    Treatments  Previous treatment: physical therapy (surgery)  Current treatment: physical therapy  Patient Goals  Patient  goals for therapy: decreased pain, increased motion, increased strength, independence with ADLs/IADLs and return to sport/leisure activities           Objective          Palpation   Left   Hypertonic in the upper trapezius.   Tenderness of the upper trapezius.     Right   Hypertonic in the anterior deltoid, biceps, pectoralis major, posterior deltoid and upper trapezius. Tenderness of the anterior deltoid, biceps, pectoralis major, posterior deltoid and upper trapezius.     Additional Palpation Details  General tenderness bilateral shoulder and lower cervical region - R > L     Active Range of Motion   Left Shoulder   Flexion: 100 degrees   External rotation 45°: 30 degrees   Internal rotation 45°: WFL    Right Shoulder   Flexion: 15 degrees   External rotation 0°: Right shoulder active external rotation at 0 degrees: -15.   Internal rotation 0°: Right shoulder active internal rotation at 0 degrees: 30.     Additional Active Range of Motion Details   R: 10 lbs. L 5 lbs.     Strength/Myotome Testing     Left Shoulder     Planes of Motion   Flexion: 4-   External rotation at 0°: 4-   Internal rotation at 0°: 4     Right Shoulder     Planes of Motion   Flexion: 2-   External rotation at 0°: 3-   Internal rotation at 0°: 3-     Left Hip   Planes of Motion   Flexion: 4+    Right Hip   Planes of Motion   Flexion: 4+    Left Knee   Flexion: 4+  Extension: 4-    Right Knee   Flexion: 4+  Extension: 4    Left Ankle/Foot   Dorsiflexion: 4+    Right Ankle/Foot   Dorsiflexion: 4+    Ambulation     Comments   Ambulates with short step length, decreased LE clearance, decreased gait velocity, limited ability for turning w/o assistive device or guarding by therapist    Functional Assessment     Comments  Five Time Sit to Stand - unable to complete          Assessment & Plan       Assessment  Impairments: abnormal gait, abnormal or restricted ROM, impaired balance, impaired physical strength, lacks appropriate home exercise  program and pain with function   Functional limitations: carrying objects, lifting, walking, pulling, pushing, uncomfortable because of pain, standing, reaching overhead and unable to perform repetitive tasks   Assessment details: Presents with limits in bilateral shoulder ROM/strength limits with significantly greater limits on R UE, pain with AROM/PROM use of UE per pain, limits in B cervical and upper thoracic ROM, altered gait, decreased balance with history of falls.  Scheduled for surgery on R shoulder this week with plan to resume PT for R shoulder ROM/strength/function and LE strength/balance/gait to improve function and decrease fall risk.   Prognosis: good    Goals  Plan Goals: ST. R shoulder pain decreased 10% or greater over 3 weeks.   2. To tolerate PROM of R shoulder without increased post session pain over 3 weeks.   3. To tolerate LE strength/balance/gait progressions over 3 weeks.     LT. B shoulder pain decreased 25% or greater over 10 weeks.   2. Gait improved with increased LE clearance, decreased limits and guarding required for turns over 10 weeks.   3. Able to complete Five Time Sit to Stand over 10 weeks.       Plan  Therapy options: will be seen for skilled therapy services  Planned modality interventions: cryotherapy, electrical stimulation/Russian stimulation, dry needling, TENS and thermotherapy (hydrocollator packs) (As appropriate)  Planned therapy interventions: manual therapy, neuromuscular re-education, soft tissue mobilization, flexibility, functional ROM exercises, strengthening, stretching, gait training, home exercise program, therapeutic activities, transfer training and joint mobilization  Frequency: 2x week  Duration in weeks: 10  Treatment plan discussed with: patient    History # of Personal Factors and/or Comorbidities: HIGH (3+)  Examination of Body System(s): # of elements: MODERATE (3)  Clinical Presentation: EVOLVING  Clinical Decision Making: MODERATE        Timed:           Therapeutic Exercise:    10     mins  28168;         Un-Timed:  Mod Eval     30     Mins  02538      Timed Treatment:   10   mins   Total Treatment:     40   mins          PT SIGNATURE: Baljinder Hernandez PT, DPT   [place] Lic #[id number]    DATE TREATMENT INITIATED: 11/4/2024    Medicare Initial Certification  Certification Period: 2/2/2025  I certify that the therapy services are furnished while this patient is under my care.  The services outlined above are required by this patient, and will be reviewed every 90 days.     PHYSICIAN: Piter Gill MD      DATE:     Please sign and return via fax to [phone].. Thank you, [facility].

## 2024-11-20 ENCOUNTER — TELEPHONE (OUTPATIENT)
Dept: PAIN MEDICINE | Facility: CLINIC | Age: 74
End: 2024-11-20
Payer: OTHER GOVERNMENT

## 2024-11-20 ENCOUNTER — TRANSCRIBE ORDERS (OUTPATIENT)
Dept: PHYSICAL THERAPY | Facility: CLINIC | Age: 74
End: 2024-11-20
Payer: OTHER GOVERNMENT

## 2024-11-20 DIAGNOSIS — R29.898 WEAKNESS OF BOTH LOWER LIMBS: ICD-10-CM

## 2024-11-20 DIAGNOSIS — Z96.611 HISTORY OF TOTAL SHOULDER REPLACEMENT, RIGHT: ICD-10-CM

## 2024-11-20 NOTE — TELEPHONE ENCOUNTER
Provider: AKANKSHA    Caller: EDYTA LAURA    Relationship to Patient: Emergency Contact    Phone Number: [phone]    Reason for Call: EDYTA STATES THAT PATIENT JUST HAD SURGERY 11/06/24 ON HIS ARM. SHE STATES THAT THE SURGEON WILL BE PRESCRIBING PAIN MEDICATION FOR HIM FOR NOW, SO THEY WON'T BE CALLING FOR HIS REFILL UNTIL HE IS RELEASED FROM THE SURGEON. SHE WANTED OFFICE TO BE AWARE.

## 2024-11-21 ENCOUNTER — TREATMENT (OUTPATIENT)
Dept: PHYSICAL THERAPY | Facility: CLINIC | Age: 74
End: 2024-11-21
Payer: MEDICARE

## 2024-11-21 DIAGNOSIS — G89.29 CHRONIC PAIN OF BOTH SHOULDERS: Primary | ICD-10-CM

## 2024-11-21 DIAGNOSIS — R26.9 GAIT DISTURBANCE: ICD-10-CM

## 2024-11-21 DIAGNOSIS — R29.898 WEAKNESS OF BOTH LOWER EXTREMITIES: ICD-10-CM

## 2024-11-21 DIAGNOSIS — M25.512 CHRONIC PAIN OF BOTH SHOULDERS: Primary | ICD-10-CM

## 2024-11-21 DIAGNOSIS — M25.511 CHRONIC PAIN OF BOTH SHOULDERS: Primary | ICD-10-CM

## 2024-11-21 NOTE — PROGRESS NOTES
Re-Assessment / Re-Certification        Patient: Chris Tinsley   : 1950  Diagnosis/ICD-10 Code:  Chronic pain of both shoulders [M25.511, G89.29, M25.512]  Referring practitioner: Natalie Ibanez NP  Date of Initial Visit: Type: THERAPY  Noted: 2024  Today's Date: 2024  Patient seen for 2 sessions      Subjective:   Chris Tinsley reports: had surgery on his right shoulder on 24  Subjective Questionnaire: QuickDASH: to complete next visit.  Clinical Progress: worse per post op status and restrictions  Home Program Compliance: Yes  Treatment has included: therapeutic exercise, neuromuscular re-education, manual therapy, therapeutic activity, moist heat, and cryotherapy    Subjective   Objective          Active Range of Motion     Additional Active Range of Motion Details  Deferred per restrictions for post op status    Incision healing well no evidence of infection  Mild UE swelling noted along incision and to R elbow    Ambulation     Comments   Short step length, decreased LE clearance, CGA/HHA required for safety    Sit to Stand - CGA/min      Assessment & Plan       Assessment  Impairments: abnormal gait, abnormal or restricted ROM, activity intolerance, impaired physical strength, lacks appropriate home exercise program, pain with function and safety issue   Functional limitations: carrying objects, lifting, walking, pulling, pushing, uncomfortable because of pain, standing, reaching behind back, reaching overhead and unable to perform repetitive tasks   Assessment details: Presents with continued limits in gait, LE strength, balance and R UE limits in ROM per pain and post operative restrictions.   He would benefit from continued skilled care to improved LE strength, balance and decrease fall risk along with improve R UE ROM/strength and function when cleared to do so per MD.     Progress toward previous goals:  only seen for initial evaluation with surgery on JESSICA briggs 2 days  afterwards    New Goals  Short-term goals (STG):   1. R shoulder pain decreased 10% or greater over 3 weeks. - Worse per post op status  2. To tolerate PROM of R shoulder without increased post session pain over 3 weeks. - None per post op restrictions  3. To tolerate LE strength/balance/gait progressions over 3 weeks. - 1st follow up after initial evaluation and surgery    Long-term goals (LTG):   1. B shoulder pain decreased 25% or greater over 10 weeks. - Worse per post op status  2. Gait improved with increased LE clearance, decreased limits and guarding required for turns over 10 weeks. - More limited this visit  3. Able to complete Five Time Sit to Stand over 10 weeks. - To assess next visit            Recommendations: Continue with recommendations to continue focus on LE strength/balance/gait and ROM allowed of R UE of elbow/wrist/hand and shoulder pendulums until cleared for progression  Timeframe: 2 months  Prognosis to achieve goals: good    PT Signature: Baljinder Hernandez, PT, DPT      Based upon review of the patient's progress and continued therapy plan, it is my medical opinion that Chris Abreuanantmitch should continue physical therapy treatment at [facility]  [address] [place] [address] [phone].    Signature: __________________________________  Natalie Ibanez NP    Timed:         Manual Therapy:    10     mins  08585;     Therapeutic Exercise:    20     mins  97066;         Un-Timed:  Re-Eval                           15    mins  86695      Timed Treatment:   30   mins   Total Treatment:     45   mins

## 2024-11-26 ENCOUNTER — TREATMENT (OUTPATIENT)
Dept: PHYSICAL THERAPY | Facility: CLINIC | Age: 74
End: 2024-11-26
Payer: MEDICARE

## 2024-11-26 DIAGNOSIS — G89.29 CHRONIC PAIN OF BOTH SHOULDERS: Primary | ICD-10-CM

## 2024-11-26 DIAGNOSIS — R29.898 WEAKNESS OF BOTH LOWER EXTREMITIES: ICD-10-CM

## 2024-11-26 DIAGNOSIS — M25.511 CHRONIC PAIN OF BOTH SHOULDERS: Primary | ICD-10-CM

## 2024-11-26 DIAGNOSIS — M25.512 CHRONIC PAIN OF BOTH SHOULDERS: Primary | ICD-10-CM

## 2024-11-26 DIAGNOSIS — R26.9 GAIT DISTURBANCE: ICD-10-CM

## 2024-11-26 NOTE — PROGRESS NOTES
Physical Therapy Daily Progress Note      Patient: Chris Tinsley   : 1950  Diagnosis/ICD-10 Code:  Chronic pain of both shoulders [M25.511, G89.29, M25.512]  Referring practitioner: Natalie Ibanez NP  Date of Initial Visit: Type: THERAPY  Noted: 2024  Today's Date: 2024  Patient seen for 3 sessions             Subjective Still having a lot of pain in his neck (mainly on left side) and right shoulder.  Still having leg weakness and standing balance.    Objective   See Exercise, Manual, and Modality Logs for complete treatment.       Assessment/Plan  Functional LE strength, balance and shoulder ROM/strength deficits are greatly apparent.  Progress as tolerated in all areas.    Progress per Plan of Care           Timed:         Manual Therapy:    15     mins  51774;     Therapeutic Exercise:    25     mins  17981;         Timed Treatment:   40   mins   Total Treatment:     40   mins        Irving Gandhi PTA  Physical Therapist Assistant

## 2024-12-02 ENCOUNTER — TELEPHONE (OUTPATIENT)
Dept: PHYSICAL THERAPY | Facility: CLINIC | Age: 74
End: 2024-12-02
Payer: OTHER GOVERNMENT

## 2024-12-02 NOTE — TELEPHONE ENCOUNTER
Caller: Chris Tinsley    Relationship: Self       What was the call regarding: DID NOT HAVE APPT ON LIST    HAD TO GO TO DR JEFFRY GRAVES

## 2024-12-06 ENCOUNTER — TELEPHONE (OUTPATIENT)
Dept: CARDIOLOGY | Facility: CLINIC | Age: 74
End: 2024-12-06
Payer: OTHER GOVERNMENT

## 2024-12-06 NOTE — TELEPHONE ENCOUNTER
Caller: EDYTA LAURA     Relationship: SPOUSE    Best call back number: [phone]    What is your medical concern? PT'S WIFE WANTED TO SEE IF WE COULD TAKE A PICTURE OF PT'S HEART BECAUSE HE'S BEEN HAVING DIZZINESS WHEN HE STANDS, CAN BARELY WALK. SHE SAID HIS PCP WAS GOING TO RUN TESTS ON HIS BRAIN. PLEASE CALL PT'S WIFE     How long has this issue been going on? FOR ALMOST A YEAR    Is your provider already aware of this issue? NO    Have you been treated for this issue? NO

## 2024-12-10 ENCOUNTER — TREATMENT (OUTPATIENT)
Dept: PHYSICAL THERAPY | Facility: CLINIC | Age: 74
End: 2024-12-10
Payer: MEDICARE

## 2024-12-10 DIAGNOSIS — M25.512 CHRONIC PAIN OF BOTH SHOULDERS: Primary | ICD-10-CM

## 2024-12-10 DIAGNOSIS — G89.29 CHRONIC PAIN OF BOTH SHOULDERS: Primary | ICD-10-CM

## 2024-12-10 DIAGNOSIS — R26.9 GAIT DISTURBANCE: ICD-10-CM

## 2024-12-10 DIAGNOSIS — M25.511 CHRONIC PAIN OF BOTH SHOULDERS: Primary | ICD-10-CM

## 2024-12-10 DIAGNOSIS — R29.898 WEAKNESS OF BOTH LOWER EXTREMITIES: ICD-10-CM

## 2024-12-10 PROCEDURE — 97530 THERAPEUTIC ACTIVITIES: CPT | Performed by: PHYSICAL THERAPIST

## 2024-12-10 PROCEDURE — 97110 THERAPEUTIC EXERCISES: CPT | Performed by: PHYSICAL THERAPIST

## 2024-12-10 NOTE — PROGRESS NOTES
Physical Therapy Daily Treatment Note  Visit: 4    Chris Tinsley reports: by his wife he has been cleared for increased activity with his right shoulder.  Reports having brain scan this week.   YOB: 1950  Referring practitioner : Natalie Ibanez NP  Date of Initial: Type: THERAPY  Noted: 11/4/2024  Today's date: 12/10/2024  Patient seen for 4 sessions    Visit Diagnoses:    ICD-10-CM ICD-9-CM   1. Chronic pain of both shoulders  M25.511 719.41    G89.29 338.29    M25.512    2. Gait disturbance  R26.9 781.2   3. Weakness of both lower extremities  R29.898 729.89       Subjective       Objective   See Exercise, Manual, and Modality Logs for complete treatment.       Assessment/Plan    AAROM R shoulder well tolerated.  LE fatigues easily still but improving tolerance.     Plan:    Continue              Timed:             Therapeutic Exercise:    28    mins  02348;     Therapeutic Activity:     10     mins  37188;           Timed Treatment:   38   mins   Total Treatment:     38   mins         Baljinder Hernandez PT, DPT  Physical Therapist  [place] Lic #[id number]

## 2024-12-11 ENCOUNTER — OFFICE VISIT (OUTPATIENT)
Dept: CARDIOLOGY | Facility: CLINIC | Age: 74
End: 2024-12-11
Payer: MEDICARE

## 2024-12-11 VITALS
BODY MASS INDEX: 23.64 KG/M2 | DIASTOLIC BLOOD PRESSURE: 60 MMHG | WEIGHT: 156 LBS | HEIGHT: 68 IN | OXYGEN SATURATION: 98 % | SYSTOLIC BLOOD PRESSURE: 110 MMHG

## 2024-12-11 DIAGNOSIS — I25.110 CORONARY ARTERY DISEASE INVOLVING NATIVE CORONARY ARTERY OF NATIVE HEART WITH UNSTABLE ANGINA PECTORIS: ICD-10-CM

## 2024-12-11 DIAGNOSIS — I10 BENIGN ESSENTIAL HTN: ICD-10-CM

## 2024-12-11 DIAGNOSIS — Z95.1 S/P CABG (CORONARY ARTERY BYPASS GRAFT): ICD-10-CM

## 2024-12-11 DIAGNOSIS — N18.31 STAGE 3A CHRONIC KIDNEY DISEASE: ICD-10-CM

## 2024-12-11 DIAGNOSIS — E78.2 MIXED HYPERLIPIDEMIA: ICD-10-CM

## 2024-12-11 DIAGNOSIS — R00.2 PALPITATIONS: Primary | ICD-10-CM

## 2024-12-11 RX ORDER — METOPROLOL TARTRATE 25 MG/1
25 TABLET, FILM COATED ORAL
COMMUNITY
Start: 2024-10-30 | End: 2024-12-11 | Stop reason: ALTCHOICE

## 2024-12-11 RX ORDER — OMEPRAZOLE 40 MG/1
40 CAPSULE, DELAYED RELEASE ORAL
COMMUNITY
Start: 2024-10-23

## 2024-12-11 RX ORDER — LORATADINE 10 MG/1
10 TABLET ORAL
COMMUNITY
Start: 2024-10-30

## 2024-12-11 NOTE — PROGRESS NOTES
Cardiology Office Visit      Encounter Date:  2024    PATIENT IDENTIFICATION    Name: hCris Tinsley  Age: 74 y.o. Sex: male : 1950  MRN: [id number]    Reason For Followup:  Dizziness    Brief Clinical History:  Patient is a 74-year-old male with coronary artery disease status post CABG x 5 2019 (LIMA to LAD, SVG to PDA, SVG to lateral branch of ramus, SVG to OM1 --medial branch of ramus) per Dr. López 2019, hypertension, hyperlipidemia, CKD stage III.    He comes to the cardiology office today for follow-up.  Patient reports that over the past months he has experienced significant dizziness and lightheadedness when he stands up.  He denies chest pain, shortness of breath, palpitations, orthopnea, lower extremity edema PND.  Patient denies syncopal episodes but feels like he has come close to it. He is accompanied by his daughter during visit today.  She admits that he has not had good oral intake over the past month, especially fluids.    His current medications include lisinopril 10 mg daily, Lopressor 25 mg twice daily, aspirin and atorvastatin.    EKG performed office today, interpreted dependently by me, shows normal sinus rhythm, left atrial enlargement, previous inferior infarct.    Assessment & Plan    Impressions:  Dizziness -likely due to dehydration  Coronary artery disease status post CABG x 5 2019 (LIMA to LAD, SVG to PDA, SVG to lateral branch of ramus, SVG to OM1 --medial branch of ramus) per Dr. López 2019   Hypertension  Hyperlipidemia  Chronic renal insufficiency stage III      Recommendations:  Explained importance of keeping patient hydrated to minimize orthostatic hypotension.  Patient's daughter will try to  improve his water intake and monitor closely his blood pressure.  Stop Lopressor for now, patient in sinus bradycardia and blood pressure 110/60 today.  Continue low-dose lisinopril for now.  Check TTE to reassess LV systolic function.  Continue aspirin, statins for  "CAD.    Diagnoses and all orders for this visit:    1. Palpitations (Primary)  -     Adult Transthoracic Echo Complete W/ Cont if Necessary Per Protocol; Future          Objective:    Vitals:  Vitals:    12/11/24 1447   BP: 110/60   BP Location: Left arm   Patient Position: Sitting   Cuff Size: Adult   SpO2: 98%   Weight: 70.8 kg (156 lb)   Height: 172.7 cm (68\")     Body mass index is 23.72 kg/m².      Physical Exam:  General: Alert, cooperative, no distress, appears stated age  Lungs:  Clear to auscultation bilaterally, no wheezes, rhonchi or rales are noted  Heart::  Regular rate and rhythm  Abdomen: Soft, nontender, nondistended, bowel sounds active  Extremities: No cyanosis, clubbing, or edema  Neuro/psych: No gross focal deficits        Allergies:  Allergies   Allergen Reactions    Ibuprofen Nausea And Vomiting       Medication Review:     Current Outpatient Medications:     atorvastatin (LIPITOR) 40 MG tablet, TAKE 1 TABLET BY MOUTH EVERY NIGHT, Disp: 90 tablet, Rfl: 0    cholecalciferol (VITAMIN D3) 25 MCG (1000 UT) tablet, Take 1 tablet by mouth Daily., Disp: , Rfl:     HYDROcodone-acetaminophen (NORCO)  MG per tablet, Take 1 tablet by mouth Every 12 (Twelve) Hours As Needed for Moderate Pain., Disp: 60 tablet, Rfl: 0    lisinopril (PRINIVIL,ZESTRIL) 10 MG tablet, TAKE 1 TABLET BY MOUTH DAILY, Disp: 90 tablet, Rfl: 1    Omega-3 Fatty Acids (FISH OIL) 1000 MG capsule capsule, Take  by mouth Daily With Breakfast., Disp: , Rfl:     omeprazole (priLOSEC) 40 MG capsule, Take 1 capsule by mouth., Disp: , Rfl:     traZODone (DESYREL) 100 MG tablet, Take 1 tablet by mouth Every Night., Disp: 90 tablet, Rfl: 1    acyclovir (ZOVIRAX) 800 MG tablet, TAKE 1 TABLET BY MOUTH 5 TIMES A DAY FOR 10 DAYS (Patient not taking: Reported on 12/11/2024), Disp: , Rfl:     aspirin 81 MG chewable tablet, Chew 1 tablet Daily. (Patient not taking: Reported on 12/11/2024), Disp: , Rfl:     CeleBREX 200 MG capsule, Take 1 capsule " by mouth. (Patient not taking: Reported on 12/11/2024), Disp: , Rfl:     HYDROcodone-acetaminophen (NORCO)  MG per tablet, Take 1 tablet by mouth Every 12 (Twelve) Hours As Needed for Moderate Pain. (Patient not taking: Reported on 12/11/2024), Disp: 60 tablet, Rfl: 0    hydrocortisone 2.5 % cream, Apply 1 application  topically to the appropriate area as directed 2 (Two) Times a Day. (Patient not taking: Reported on 12/11/2024), Disp: 30 g, Rfl: 1    loratadine (CLARITIN) 10 MG tablet, 1 tablet. (Patient not taking: Reported on 12/11/2024), Disp: , Rfl:     TRIAZOLAM PO, Take  by mouth. (Patient not taking: Reported on 12/11/2024), Disp: , Rfl:     Family History:  Family History   Problem Relation Age of Onset    Heart disease Father        Past Medical History:  Past Medical History:   Diagnosis Date    Abnormal nuclear stress test 06/22/2019    Chronic deep vein thrombosis (DVT) of left lower extremity     BHATTI (nonalcoholic steatohepatitis) 06/2019    Unstable angina 06/22/2019    Vertigo        Past Surgical History:  Past Surgical History:   Procedure Laterality Date    CARDIAC CATHETERIZATION N/A 6/22/2019    Procedure: LEFT HEART CATH with possible PCI;  Surgeon: Enmanuel Wild MD;  Location: [facility] CATH INVASIVE LOCATION;  Service: Cardiovascular    COLON SURGERY      CORONARY ARTERY BYPASS GRAFT N/A 6/24/2019    Procedure: CABG;  Surgeon: Jose Angel López MD;  Location: [facility] CVOR;  Service: Cardiothoracic    LIVER BIOPSY  06/2019       Social History:  Social History     Socioeconomic History    Marital status:    Tobacco Use    Smoking status: Never     Passive exposure: Never    Smokeless tobacco: Never   Vaping Use    Vaping status: Never Used   Substance and Sexual Activity    Alcohol use: Not Currently     Comment: quit 20 yrs ago    Drug use: No    Sexual activity: Defer       Review of Systems:  The following systems were reviewed as they relate to the cardiovascular  system: Constitutional, Eyes, ENT, Cardiovascular, Respiratory, Gastrointestinal, Integumentary, Neurological, Psychiatric, Hematologic, Endocrine, Musculoskeletal, and Genitourinary. The pertinent cardiovascular findings are reported above with all other cardiovascular points within those systems being negative.    Diagnostic Study Review:     Current Electrocardiogram:    ECG 12 Lead    Date/Time: 12/11/2024 4:20 PM  Performed by: Juwan Allison MD    Authorized by: Juwan Allison MD  Comparison: compared with previous ECG   Similar to previous ECG  Rhythm: sinus rhythm  Rate: normal  Q waves: III and aVF    QRS axis: normal  Other findings: non-specific ST-T wave changes    Clinical impression: abnormal EKG      Laboratory Data:  Lab Results   Component Value Date    GLUCOSE 113 (H) 08/08/2023    BUN 34 (H) 08/08/2023    CREATININE 1.60 (H) 07/06/2024    EGFRIFNONA 47 (L) 01/28/2022    BCR 24.6 08/08/2023    K 4.1 08/08/2023    CO2 24.0 08/08/2023    CALCIUM 9.9 08/08/2023    ALBUMIN 4.5 08/08/2023    AST 16 01/04/2023    ALT 16 01/04/2023     Lab Results   Component Value Date    GLUCOSE 113 (H) 08/08/2023    CALCIUM 9.9 08/08/2023     08/08/2023    K 4.1 08/08/2023    CO2 24.0 08/08/2023     08/08/2023    BUN 34 (H) 08/08/2023    CREATININE 1.60 (H) 07/06/2024    EGFRIFNONA 47 (L) 01/28/2022    BCR 24.6 08/08/2023    ANIONGAP 10.0 08/08/2023     Lab Results   Component Value Date    WBC 5.70 01/04/2023    HGB 13.8 01/04/2023    HCT 41.9 01/04/2023    MCV 96.1 01/04/2023     (L) 01/04/2023     Lab Results   Component Value Date    CHOL 119 08/08/2023    TRIG 144 08/08/2023    HDL 43 08/08/2023    LDL 51 08/08/2023     Lab Results   Component Value Date    HGBA1C 5.5 06/23/2019     Lab Results   Component Value Date    INR 1.05 01/11/2022    INR 1.21 (H) 06/25/2019    INR 1.25 (H) 06/24/2019    PROTIME 11.6 01/11/2022    PROTIME 12.1 (H)  06/25/2019    PROTIME 12.5 (H) 06/24/2019       Most Recent Echo:  Results for orders placed during the hospital encounter of 06/20/19    Adult Transthoracic Echo Complete W/ Cont if Necessary Per Protocol    Interpretation Summary  · The left ventricular cavity is mildly dilated.  · Left ventricular wall thickness is consistent with mild-to-moderate concentric hypertrophy.  · Left ventricular systolic function is low normal.  · Left ventricular diastolic dysfunction (grade I a) consistent with impaired relaxation.  · Left atrial cavity size is mild-to-moderately dilated.    Mildly dilated left ventricle with mild to moderate concentric left ventricle hypertrophy with diastolic dysfunction with overall EF of 50%  Mild to moderate biatrial enlargement  No effusion  Trace tricuspid regurgitation without pulmonary hypertension       Most Recent Stress Test:  Results for orders placed during the hospital encounter of 01/03/23    Stress Test With Myocardial Perfusion One Day    Interpretation Summary    Myocardial perfusion study shows moderate size severe intensity mostly fixed perfusion defect involving the basal inferolateral wall suggestive of prior myocardial infarction with no significant reversible ischemia    Left ventricular ejection fraction is normal (Calculated EF = 60%).    Clinical correlation is recommended       Most Recent Cardiac Catheterization:   Results for orders placed during the hospital encounter of 06/20/19    Cardiac Catheterization/Vascular Study    Narrative  CARDIAC CATHETERIZATION REPORT    DATE OF PROCEDURE: 6/22/2019    INDICATION FOR PROCEDURE:    Unstable angina  Abnormal stress test with large area of inducible ischemia    PROCEDURE PERFORMED:  1.Left heart catheterization  2. coronary angiography  3. left ventriculography    PROCEDURE COMMENTS:    Informed consent was obtained from the patient after explaining risks and benefits.  Patient was brought to the cardiac interventional  artery and placed on the table in the usual fashion.  Right groin was shaved and prepped in sterile fashion.  2% again was used with midsternal anesthesia to the right groin.  A total of 20 cc was used.  A 6 Swazi sheath was placed in the right femoral artery.  A 6 Swazi pigtail catheter, 6 Swazi JR4 catheter and a 6 Swazi JL4 catheter was used for the procedure.  After the cardiac catheterization is complete, all the catheters and sheath were removed.  Patient tolerated the procedure very well.  No complications noted.    FINDINGS:    1. HEMODYNAMICS:  , ventricular end-diastolic pressure 14-18, /84 with a mean pressure 111.    2. LEFT VENTRICULOGRAPHY: EF 50%, mild to moderate inferior wall hypokinesis , 1-2+ mitral regurgitation.    3. CORONARY ANGIOGRAPHY:    Left main: Normal    LAD: Proximal angiographic 70 to 80% stenosis.  Mid to distal focal area of 70% stenosis.  First diagonal branch is a moderate sized vessel with diffuse proximal 70% stenosis.    Left circumflex artery: 100% occlusion in the midportion with left to left collaterals filling the OM branch.    RCA: 100% occlusion in the proximal portion with left-to-right collaterals.    SUMMARY:  Severe three-vessel coronary artery disease.  100% occlusion of the circumflex and 100% occlusion of the right coronary artery with left-to-right and left to left collaterals  Significant stenosis involving the LAD and diagonal branches  Mild LV systolic dysfunction with significant hypokinesis involving the basal inferior wall with an estimated LV contraction of 50%  1-2+ mitral regurgitation    RECOMMENDATIONS:  Surgical evaluation for consideration for a coronary artery bypass surgery  Echocardiogram to assess the LV systolic function and also rule out any significant valve pathology  Observe patient in PCU bed  Optimize medical therapy  Test results discussed the patient and family        Enmanuel Wild MD  6/22/2019  10:49 AM    "    NOTE: The following portions of the patient's note were reviewed, confirmed and/or updated this visit as appropriate: History of present illness/Interval history, physical examination, assessment & plan, allergies, current medications, past family history, past medical history, past social history, past surgical history and problem list.    Labs pertinent to today's visit on 12/11/2024 (including but not limited to CBC, CMP, and lipid profiles) were requested from the patient's primary care provider/hospital/clinical laboratory.  If the labs were available for the visit, they were reviewed with the patient.  If they were not available, when received, special interest will be made to the labs pertinent to this visit.  The patient's most recent \"in-house\" labs are noted below and have been reviewed.  Outside labs pertinent to this visit are scanned into the record and have been reviewed.    Discussions held today, 12/11/2024,regarding procedures included risk, benefits, and options including but not limited to: Death, MI, stroke, pain, bleeding, infection, and possible need for vascular/thoracic/cardiothoracic surgery.    Copied information within this note was reviewed and is current as of 12/11/2024.    Assessment and plan noted herein represents the current plan of care as of 12/11/2024.    Significant resources from our office and staff are inherent in engaging this patient in a continuous and active collaborative plan of care related to their chronic cardiovascular conditions outlined herein.  The management of these conditions requires the direction of our service with specialized clinical knowledge, skills, and experience.  This collaborative care includes but is not limited to patient education, expectations and responsibilities, shared decision making around therapeutic goals, and shared commitments to achieve those goals.  "

## 2024-12-11 NOTE — PATIENT INSTRUCTIONS
Thank you for following up with cardiology today.  We encourage you to continue taking her medications and engaging healthy lifestyle habits including physical activity as tolerated. We will stop metoprolol. We will obtain an echocardiogram to better evaluate your heart.  If you have any questions please let us know.

## 2024-12-12 ENCOUNTER — TREATMENT (OUTPATIENT)
Dept: PHYSICAL THERAPY | Facility: CLINIC | Age: 74
End: 2024-12-12
Payer: MEDICARE

## 2024-12-12 DIAGNOSIS — M25.512 CHRONIC PAIN OF BOTH SHOULDERS: Primary | ICD-10-CM

## 2024-12-12 DIAGNOSIS — M25.511 CHRONIC PAIN OF BOTH SHOULDERS: Primary | ICD-10-CM

## 2024-12-12 DIAGNOSIS — R29.898 WEAKNESS OF BOTH LOWER EXTREMITIES: ICD-10-CM

## 2024-12-12 DIAGNOSIS — R26.9 GAIT DISTURBANCE: ICD-10-CM

## 2024-12-12 DIAGNOSIS — G89.29 CHRONIC PAIN OF BOTH SHOULDERS: Primary | ICD-10-CM

## 2024-12-12 NOTE — PROGRESS NOTES
Physical Therapy Daily Treatment Note  Visit: 5    Chris Tinsley reports: no new issues today.   YOB: 1950  Referring practitioner : Natalie Ibanez NP  Date of Initial: Type: THERAPY  Noted: 11/4/2024  Today's date: 12/12/2024  Patient seen for 5 sessions    Visit Diagnoses:    ICD-10-CM ICD-9-CM   1. Chronic pain of both shoulders  M25.511 719.41    G89.29 338.29    M25.512    2. Gait disturbance  R26.9 781.2   3. Weakness of both lower extremities  R29.898 729.89       Subjective       Objective     AAROM R shoulder FL - 45 beginning - 80 degrees end of visit  See Exercise, Manual, and Modality Logs for complete treatment.       Assessment/Plan    Improving R shoulder active and active assisted ROM.  Requires regular cueing for correct exercise technique and some difficulty with exercises with more than 1 cue.     Plan:    Continue              Timed:         Manual Therapy:    10     mins  04280;     Therapeutic Exercise:    16     mins  66763;     Therapeutic Activity:     12     mins  10220;           Timed Treatment:   38   mins   Total Treatment:     38   mins         Baljinder Hernandez PT, DPT  Physical Therapist  [place] Lic #[id number]

## 2024-12-18 ENCOUNTER — TREATMENT (OUTPATIENT)
Dept: PHYSICAL THERAPY | Facility: CLINIC | Age: 74
End: 2024-12-18
Payer: MEDICARE

## 2024-12-18 DIAGNOSIS — F51.05 INSOMNIA DUE TO MENTAL CONDITION: Chronic | ICD-10-CM

## 2024-12-18 DIAGNOSIS — R26.9 GAIT DISTURBANCE: ICD-10-CM

## 2024-12-18 DIAGNOSIS — M25.511 CHRONIC PAIN OF BOTH SHOULDERS: Primary | ICD-10-CM

## 2024-12-18 DIAGNOSIS — G89.29 CHRONIC PAIN OF BOTH SHOULDERS: Primary | ICD-10-CM

## 2024-12-18 DIAGNOSIS — R29.898 WEAKNESS OF BOTH LOWER EXTREMITIES: ICD-10-CM

## 2024-12-18 DIAGNOSIS — M25.512 CHRONIC PAIN OF BOTH SHOULDERS: Primary | ICD-10-CM

## 2024-12-18 RX ORDER — TRAZODONE HYDROCHLORIDE 100 MG/1
100 TABLET ORAL NIGHTLY
Qty: 90 TABLET | Refills: 1 | Status: SHIPPED | OUTPATIENT
Start: 2024-12-18

## 2024-12-18 NOTE — PROGRESS NOTES
Physical Therapy Daily Treatment Note  Visit: 6    Chris Tinsley reports: has had some limits in his ability to walk/stand per hip pain the past several days.  His wife reports new orders from MD to treat his right shoulder.   YOB: 1950  Referring practitioner : Natalie Ibanez NP  Date of Initial: Type: THERAPY  Noted: 11/4/2024  Today's date: 12/18/2024  Patient seen for 6 sessions    Visit Diagnoses:    ICD-10-CM ICD-9-CM   1. Chronic pain of both shoulders  M25.511 719.41    G89.29 338.29    M25.512    2. Gait disturbance  R26.9 781.2   3. Weakness of both lower extremities  R29.898 729.89       Subjective       Objective   See Exercise, Manual, and Modality Logs for complete treatment.       Assessment/Plan    Improved active and passive R shoulder ROM though still limited especially with ER ROM.   Limited LE activity per fatigue and hip pain.     Plan:    Continue            Timed:         Manual Therapy:    10     mins  90290;     Therapeutic Exercise:    15     mins  12125;           Timed Treatment:   25   mins   Total Treatment:     25   mins         Baljinder Hernandez PT, DPT  Physical Therapist  [place] Lic #[id number]

## 2024-12-20 ENCOUNTER — TREATMENT (OUTPATIENT)
Dept: PHYSICAL THERAPY | Facility: CLINIC | Age: 74
End: 2024-12-20
Payer: MEDICARE

## 2024-12-20 DIAGNOSIS — M25.511 CHRONIC PAIN OF BOTH SHOULDERS: Primary | ICD-10-CM

## 2024-12-20 DIAGNOSIS — R29.898 WEAKNESS OF BOTH LOWER EXTREMITIES: ICD-10-CM

## 2024-12-20 DIAGNOSIS — M25.512 CHRONIC PAIN OF BOTH SHOULDERS: Primary | ICD-10-CM

## 2024-12-20 DIAGNOSIS — R26.9 GAIT DISTURBANCE: ICD-10-CM

## 2024-12-20 DIAGNOSIS — G89.29 CHRONIC PAIN OF BOTH SHOULDERS: Primary | ICD-10-CM

## 2024-12-20 NOTE — PROGRESS NOTES
Physical Therapy Daily Treatment Note  Visit: 7    Chris Tinsley reports: still some left hip pain but improved from most recent visit.  His wife reports brain scan revealed some ischemia with plan for MRI to assess further.     YOB: 1950  Referring practitioner : Natalie Ibanez NP  Date of Initial: Type: THERAPY  Noted: 11/4/2024  Today's date: 12/20/2024  Patient seen for 7 sessions    Visit Diagnoses:    ICD-10-CM ICD-9-CM   1. Chronic pain of both shoulders  M25.511 719.41    G89.29 338.29    M25.512    2. Weakness of both lower extremities  R29.898 729.89   3. Gait disturbance  R26.9 781.2       Subjective       Objective     AAROM R shoulder FL - 80 degrees end of visit  PROM ER - 15 degrees right shoulder.   See Exercise, Manual, and Modality Logs for complete treatment.       Assessment/Plan    Improved but limited tolerance to active and passive ROM of R shoulder and resumption of LE strength/aerobic activity to improved gait/transfers.  He would benefit from continued use of a wheelchair intermittently per fall risk with gait past short duration and need for guarding or use of assistive device/hand hold assist for safety. Encouraged to continue with LE strengthening and standing/gait to tolerance as appropriate to maintain and improved independence.     Plan:    Continue            Timed:         Manual Therapy:    10     mins  90512;     Therapeutic Exercise:    20     mins  87607;     Therapeutic Activity:     10     mins  02191;           Timed Treatment:   40   mins   Total Treatment:     40   mins         Baljinder Hernandez PT, DPT  Physical Therapist  [place] Lic #[id number]

## 2024-12-23 ENCOUNTER — TREATMENT (OUTPATIENT)
Dept: PHYSICAL THERAPY | Facility: CLINIC | Age: 74
End: 2024-12-23
Payer: MEDICARE

## 2024-12-23 DIAGNOSIS — G89.29 CHRONIC PAIN OF BOTH SHOULDERS: Primary | ICD-10-CM

## 2024-12-23 DIAGNOSIS — M25.512 CHRONIC PAIN OF BOTH SHOULDERS: Primary | ICD-10-CM

## 2024-12-23 DIAGNOSIS — R26.9 GAIT DISTURBANCE: ICD-10-CM

## 2024-12-23 DIAGNOSIS — R29.898 WEAKNESS OF BOTH LOWER EXTREMITIES: ICD-10-CM

## 2024-12-23 DIAGNOSIS — M25.511 CHRONIC PAIN OF BOTH SHOULDERS: Primary | ICD-10-CM

## 2024-12-23 PROCEDURE — 97110 THERAPEUTIC EXERCISES: CPT | Performed by: PHYSICAL THERAPIST

## 2024-12-23 PROCEDURE — 97530 THERAPEUTIC ACTIVITIES: CPT | Performed by: PHYSICAL THERAPIST

## 2024-12-23 PROCEDURE — 97140 MANUAL THERAPY 1/> REGIONS: CPT | Performed by: PHYSICAL THERAPIST

## 2024-12-23 RX ORDER — ATORVASTATIN CALCIUM 40 MG/1
TABLET, FILM COATED ORAL
Qty: 90 TABLET | Refills: 0 | Status: SHIPPED | OUTPATIENT
Start: 2024-12-23

## 2024-12-23 NOTE — PROGRESS NOTES
Physical Therapy Daily Treatment Note  Visit: 8    Chris Tinsley reports: did well with last visit no new issues.   YOB: 1950  Referring practitioner : Natalie Ibanez NP  Date of Initial: Type: THERAPY  Noted: 11/4/2024  Today's date: 12/23/2024  Patient seen for 8 sessions    Visit Diagnoses:    ICD-10-CM ICD-9-CM   1. Chronic pain of both shoulders  M25.511 719.41    G89.29 338.29    M25.512    2. Weakness of both lower extremities  R29.898 729.89   3. Gait disturbance  R26.9 781.2       Subjective       Objective   AROM R shoulder elevation 35 degrees beginning - 50 degrees AB end - PROM 95 degrees elevation end of visit  See Exercise, Manual, and Modality Logs for complete treatment.       Assessment/Plan    Improving R shoulder active/passive ROM.  Still limited with standing/walking/transfers requiring guarding.     Plan:    Continue           Timed:         Manual Therapy:    10     mins  98471;     Therapeutic Exercise:    20     mins  04667;      Therapeutic Activity:     10     mins  28468;           Timed Treatment:   40   mins   Total Treatment:     40   mins         Baljinder Hernandez PT, DPT  Physical Therapist  [place] Lic #[id number]

## 2024-12-30 ENCOUNTER — TREATMENT (OUTPATIENT)
Dept: PHYSICAL THERAPY | Facility: CLINIC | Age: 74
End: 2024-12-30
Payer: MEDICARE

## 2024-12-30 DIAGNOSIS — M25.512 CHRONIC PAIN OF BOTH SHOULDERS: Primary | ICD-10-CM

## 2024-12-30 DIAGNOSIS — R26.9 GAIT DISTURBANCE: ICD-10-CM

## 2024-12-30 DIAGNOSIS — R29.898 WEAKNESS OF BOTH LOWER EXTREMITIES: ICD-10-CM

## 2024-12-30 DIAGNOSIS — M25.511 CHRONIC PAIN OF BOTH SHOULDERS: Primary | ICD-10-CM

## 2024-12-30 DIAGNOSIS — G89.29 CHRONIC PAIN OF BOTH SHOULDERS: Primary | ICD-10-CM

## 2024-12-30 NOTE — PROGRESS NOTES
Physical Therapy Daily Treatment Note  Visit: 9    Chris Tinsley reports: he is feeling tired today.   YOB: 1950  Referring practitioner : Natalie Ibanez NP  Date of Initial: Type: THERAPY  Noted: 11/4/2024  Today's date: 12/30/2024  Patient seen for 9 sessions    Visit Diagnoses:    ICD-10-CM ICD-9-CM   1. Chronic pain of both shoulders  M25.511 719.41    G89.29 338.29    M25.512    2. Weakness of both lower extremities  R29.898 729.89   3. Gait disturbance  R26.9 781.2       Subjective       Objective   R shoulder AAROM FL 80 degrees, ER 20 degrees seated  See Exercise, Manual, and Modality Logs for complete treatment.       Assessment/Plan    R shoulder active/passive ROM improving but still limited by pain.  Still requires frequent cueing for exercise technique and limited LE exercise tolerance per fatigue today.     Plan:    Continue               Timed:         Manual Therapy:    10     mins  85783;     Therapeutic Exercise:    18     mins  07368;     Therapeutic Activity:     10     mins  44790;         Timed Treatment:   38   mins   Total Treatment:     38   mins         Baljinder Hernandez PT, DPT  Physical Therapist  [place] Lic #[id number]

## 2024-12-31 ENCOUNTER — HOSPITAL ENCOUNTER (OUTPATIENT)
Dept: CARDIOLOGY | Facility: HOSPITAL | Age: 74
Discharge: HOME | End: 2024-12-31
Admitting: STUDENT IN AN ORGANIZED HEALTH CARE EDUCATION/TRAINING PROGRAM
Payer: MEDICARE

## 2024-12-31 VITALS — BODY MASS INDEX: 23.02 KG/M2 | HEIGHT: 68 IN | WEIGHT: 151.9 LBS

## 2024-12-31 DIAGNOSIS — R00.2 PALPITATIONS: ICD-10-CM

## 2024-12-31 LAB
AV MEAN PRESS GRAD SYS DOP V1V2: 4.8 MMHG
AV VMAX SYS DOP: 189.8 CM/SEC
BH CV ECHO MEAS - ACS: 1.32 CM
BH CV ECHO MEAS - AI P1/2T: 695.2 MSEC
BH CV ECHO MEAS - AO MAX PG: 14.5 MMHG
BH CV ECHO MEAS - AO ROOT DIAM: 3 CM
BH CV ECHO MEAS - AO V2 VTI: 25.9 CM
BH CV ECHO MEAS - AVA(I,D): 2.24 CM2
BH CV ECHO MEAS - EDV(CUBED): 168.1 ML
BH CV ECHO MEAS - EDV(MOD-SP4): 103.5 ML
BH CV ECHO MEAS - EF(MOD-SP4): 35.8 %
BH CV ECHO MEAS - ESV(CUBED): 87.5 ML
BH CV ECHO MEAS - ESV(MOD-SP4): 66.5 ML
BH CV ECHO MEAS - FS: 19.6 %
BH CV ECHO MEAS - IVS/LVPW: 0.99 CM
BH CV ECHO MEAS - IVSD: 1.1 CM
BH CV ECHO MEAS - LA DIMENSION: 4.1 CM
BH CV ECHO MEAS - LV DIASTOLIC VOL/BSA (35-75): 56.9 CM2
BH CV ECHO MEAS - LV MASS(C)D: 243.2 GRAMS
BH CV ECHO MEAS - LV MAX PG: 3.1 MMHG
BH CV ECHO MEAS - LV MEAN PG: 1.53 MMHG
BH CV ECHO MEAS - LV SYSTOLIC VOL/BSA (12-30): 36.5 CM2
BH CV ECHO MEAS - LV V1 MAX: 87.4 CM/SEC
BH CV ECHO MEAS - LV V1 VTI: 16.9 CM
BH CV ECHO MEAS - LVIDD: 5.5 CM
BH CV ECHO MEAS - LVIDS: 4.4 CM
BH CV ECHO MEAS - LVOT AREA: 3.4 CM2
BH CV ECHO MEAS - LVOT DIAM: 2.09 CM
BH CV ECHO MEAS - LVPWD: 1.1 CM
BH CV ECHO MEAS - MR MAX PG: 98 MMHG
BH CV ECHO MEAS - MR MAX VEL: 494.8 CM/SEC
BH CV ECHO MEAS - MV A MAX VEL: 82.4 CM/SEC
BH CV ECHO MEAS - MV DEC SLOPE: 270.8 CM/SEC2
BH CV ECHO MEAS - MV DEC TIME: 0.19 SEC
BH CV ECHO MEAS - MV E MAX VEL: 51.7 CM/SEC
BH CV ECHO MEAS - MV E/A: 0.63
BH CV ECHO MEAS - MV MAX PG: 2.7 MMHG
BH CV ECHO MEAS - MV MEAN PG: 0.93 MMHG
BH CV ECHO MEAS - MV V2 VTI: 23.4 CM
BH CV ECHO MEAS - MVA(VTI): 2.48 CM2
BH CV ECHO MEAS - PA ACC TIME: 0.09 SEC
BH CV ECHO MEAS - PA V2 MAX: 71.5 CM/SEC
BH CV ECHO MEAS - PI END-D VEL: 73.2 CM/SEC
BH CV ECHO MEAS - PULM A REVS DUR: 0.09 SEC
BH CV ECHO MEAS - PULM A REVS VEL: 20.3 CM/SEC
BH CV ECHO MEAS - PULM DIAS VEL: 39.7 CM/SEC
BH CV ECHO MEAS - PULM S/D: 1.28
BH CV ECHO MEAS - PULM SYS VEL: 50.6 CM/SEC
BH CV ECHO MEAS - RAP SYSTOLE: 3 MMHG
BH CV ECHO MEAS - RVSP: 30.8 MMHG
BH CV ECHO MEAS - SV(LVOT): 58 ML
BH CV ECHO MEAS - SV(MOD-SP4): 37 ML
BH CV ECHO MEAS - SVI(LVOT): 31.9 ML/M2
BH CV ECHO MEAS - SVI(MOD-SP4): 20.4 ML/M2
BH CV ECHO MEAS - TAPSE (>1.6): 1.4 CM
BH CV ECHO MEAS - TR MAX PG: 27.8 MMHG
BH CV ECHO MEAS - TR MAX VEL: 262.9 CM/SEC
BH CV XLRA - RV BASE: 3.9 CM
BH CV XLRA - RV MID: 2 CM

## 2024-12-31 PROCEDURE — 93306 TTE W/DOPPLER COMPLETE: CPT

## 2025-01-02 ENCOUNTER — TELEPHONE (OUTPATIENT)
Dept: PHYSICAL THERAPY | Facility: OTHER | Age: 75
End: 2025-01-02
Payer: MEDICARE

## 2025-01-02 ENCOUNTER — OFFICE VISIT (OUTPATIENT)
Dept: PAIN MEDICINE | Facility: CLINIC | Age: 75
End: 2025-01-02
Payer: MEDICARE

## 2025-01-02 VITALS
SYSTOLIC BLOOD PRESSURE: 131 MMHG | HEART RATE: 82 BPM | RESPIRATION RATE: 16 BRPM | OXYGEN SATURATION: 98 % | DIASTOLIC BLOOD PRESSURE: 97 MMHG

## 2025-01-02 DIAGNOSIS — G89.29 CHRONIC PAIN OF BOTH SHOULDERS: ICD-10-CM

## 2025-01-02 DIAGNOSIS — M50.20 DISPLACEMENT OF CERVICAL INTERVERTEBRAL DISC WITHOUT MYELOPATHY: ICD-10-CM

## 2025-01-02 DIAGNOSIS — M25.512 CHRONIC PAIN OF BOTH SHOULDERS: ICD-10-CM

## 2025-01-02 DIAGNOSIS — M25.511 CHRONIC PAIN OF BOTH SHOULDERS: ICD-10-CM

## 2025-01-02 PROCEDURE — G0463 HOSPITAL OUTPT CLINIC VISIT: HCPCS | Performed by: STUDENT IN AN ORGANIZED HEALTH CARE EDUCATION/TRAINING PROGRAM

## 2025-01-02 RX ORDER — HYDROCODONE BITARTRATE AND ACETAMINOPHEN 10; 325 MG/1; MG/1
1 TABLET ORAL EVERY 12 HOURS PRN
Qty: 60 TABLET | Refills: 0 | Status: SHIPPED | OUTPATIENT
Start: 2025-02-21

## 2025-01-02 RX ORDER — HYDROCODONE BITARTRATE AND ACETAMINOPHEN 10; 325 MG/1; MG/1
1 TABLET ORAL EVERY 12 HOURS PRN
Qty: 60 TABLET | Refills: 0 | Status: SHIPPED | OUTPATIENT
Start: 2025-03-20

## 2025-01-02 RX ORDER — HYDROCODONE BITARTRATE AND ACETAMINOPHEN 10; 325 MG/1; MG/1
1 TABLET ORAL EVERY 12 HOURS PRN
Qty: 60 TABLET | Refills: 0 | Status: SHIPPED | OUTPATIENT
Start: 2025-01-22

## 2025-01-02 NOTE — PROGRESS NOTES
CHIEF COMPLAINT  Chief Complaint   Patient presents with    Pain     LD Hydrocodone @ 0600 1/2/25       Primary Care  Duarte, Irving ATKINSON MD    Subjective   Chris GONZALEZ Alvina is a 74 y.o. male  who presents for right shoulder pain medication management.  He states that approximately 6 months ago, he sustained a fall off a ladder and subsequently fractured the head of the humerus.  He also sustained a rotator cuff injury resulting in biceps tendinopathy as well as adhesive capsulitis.  He is currently being treated conservatively with his orthopedic surgeon, but continues to require pain medication.  He is currently working with physical therapy as well as exercising several times a day at home.    Neck Pain     Pain  Associated symptoms include arthralgias, myalgias and neck pain. Pertinent negatives include no fatigue.        Location: Right shoulder  Onset: 6 months ago  Duration: Improving  Timing: Constant throughout the day  Quality: Sharp, stabbing  Severity: Today: 3       Last Week: 3       Worst: 7  Modifying Factors: The pain is worse with exercising and working with physical therapy    Physical Therapy: yes    Interval Update 01/02/2025: Recently had shoulder replacement.  Otherwise, no changes.  Continues with hydrocodone twice daily    The following portions of the patient's history were reviewed and updated as appropriate: allergies, current medications, past family history, past medical history, past social history, past surgical history and problem list.    Procedures:  4/6/2023: Cervical epidural: 0% benefit      Current Outpatient Medications:     albuterol sulfate  (90 Base) MCG/ACT inhaler, Inhale 2 puffs Every 4 (Four) Hours As Needed for Shortness of Air., Disp: 18 g, Rfl: 0    atorvastatin (LIPITOR) 40 MG tablet, TAKE 1 TABLET BY MOUTH EVERY NIGHT, Disp: 90 tablet, Rfl: 0    cetirizine (zyrTEC) 10 MG tablet, Take 1 tablet by mouth Daily., Disp: , Rfl:     cholecalciferol (VITAMIN D3) 25  MCG (1000 UT) tablet, Take 1 tablet by mouth Daily., Disp: , Rfl:     folic acid (FOLVITE) 1 MG tablet, Take 1 tablet by mouth., Disp: , Rfl:     guaiFENesin (MUCINEX) 600 MG 12 hr tablet, Take 2 tablets by mouth 2 (Two) Times a Day for 5 days., Disp: 20 tablet, Rfl: 0    [START ON 2/21/2025] HYDROcodone-acetaminophen (NORCO)  MG per tablet, Take 1 tablet by mouth Every 12 (Twelve) Hours As Needed for Moderate Pain., Disp: 60 tablet, Rfl: 0    [START ON 3/20/2025] HYDROcodone-acetaminophen (NORCO)  MG per tablet, Take 1 tablet by mouth Every 12 (Twelve) Hours As Needed for Moderate Pain., Disp: 60 tablet, Rfl: 0    hydrocortisone 2.5 % cream, Apply 1 application  topically to the appropriate area as directed 2 (Two) Times a Day., Disp: 30 g, Rfl: 1    lisinopril (PRINIVIL,ZESTRIL) 10 MG tablet, TAKE 1 TABLET BY MOUTH DAILY, Disp: 90 tablet, Rfl: 1    loratadine (CLARITIN) 10 MG tablet, 1 tablet., Disp: , Rfl:     Omega-3 Fatty Acids (FISH OIL) 1000 MG capsule capsule, Take  by mouth Daily With Breakfast., Disp: , Rfl:     omeprazole (priLOSEC) 40 MG capsule, Take 1 capsule by mouth., Disp: , Rfl:     predniSONE (DELTASONE) 10 MG (21) dose pack, Take  by mouth Daily. Use as directed on package, Disp: 21 each, Rfl: 0    traZODone (DESYREL) 100 MG tablet, TAKE 1 TABLET BY MOUTH EVERY NIGHT, Disp: 90 tablet, Rfl: 1    TRIAZOLAM PO, Take  by mouth., Disp: , Rfl:     [START ON 1/22/2025] HYDROcodone-acetaminophen (NORCO)  MG per tablet, Take 1 tablet by mouth Every 12 (Twelve) Hours As Needed for Moderate Pain., Disp: 60 tablet, Rfl: 0    Review of Systems   Constitutional:  Negative for fatigue.   Gastrointestinal:  Negative for constipation.   Musculoskeletal:  Positive for arthralgias, myalgias and neck pain.        Right shoulder pain       Vitals:    01/02/25 1024   BP: 131/97   Pulse: 82   Resp: 16   SpO2: 98%   PainSc:   9       Urine Drug Screen: 6/12/2024  Appropriate: Yes    Objective    Physical Exam  Vitals and nursing note reviewed.   Constitutional:       General: He is not in acute distress.     Appearance: Normal appearance. He is normal weight.   Neck:      Comments: Cervical spine exam:  1.  Cervical paraspinals tender to palpation bilaterally  2.  Cervical facet loading negative bilaterally  3.  Spurling equivocal on both the left and the right  4.  Significant tenderness in the trapezius bilaterally  Musculoskeletal:      Right shoulder: Tenderness and crepitus present. Decreased range of motion. Decreased strength.      Cervical back: Neck supple. Tenderness present.   Neurological:      Mental Status: He is alert.           Assessment & Plan   Problems Addressed this Visit    None  Visit Diagnoses       Displacement of cervical intervertebral disc without myelopathy        Relevant Medications    HYDROcodone-acetaminophen (NORCO)  MG per tablet (Start on 2/21/2025)    HYDROcodone-acetaminophen (NORCO)  MG per tablet (Start on 3/20/2025)    HYDROcodone-acetaminophen (NORCO)  MG per tablet (Start on 1/22/2025)    Chronic pain of both shoulders        Relevant Medications    HYDROcodone-acetaminophen (NORCO)  MG per tablet (Start on 2/21/2025)    HYDROcodone-acetaminophen (NORCO)  MG per tablet (Start on 3/20/2025)    HYDROcodone-acetaminophen (NORCO)  MG per tablet (Start on 1/22/2025)          Diagnoses         Codes Comments    Displacement of cervical intervertebral disc without myelopathy     ICD-10-CM: M50.20  ICD-9-CM: 722.0     Chronic pain of both shoulders     ICD-10-CM: M25.511, G89.29, M25.512  ICD-9-CM: 719.41, 338.29             Plan:  Continue hydrocodone 10 mg twice daily  No longer on Robaxin  UDS inspect appropriate    --- Follow-up 3 months         INSPECT REPORT    As part of the patient's treatment plan, I may be prescribing controlled substances. The patient has been made aware of appropriate use of such medications, including  potential risk of somnolence, limited ability to drive and/or work safely, and the potential for dependence or overdose. It has also bee made clear that these medications are for use by this patient only, without concomitant use of alcohol or other substances unless prescribed.     Patient has completed prescribing agreement detailing terms of continued prescribing of controlled substances, including monitoring ESTEFANIA reports, urine drug screening, and pill counts if necessary. The patient is aware that inappropriate use will results in cessation of prescribing such medications.    INSPECT report has been reviewed and scanned into the patient's chart.    As the clinician, I personally reviewed the INSPECT from 12/30/2024.    History and physical exam exhibit continued safe and appropriate use of controlled substances.      EMR Dragon/Transcription disclaimer:   Much of this encounter note is an electronic transcription/translation of spoken language to printed text. The electronic translation of spoken language may permit erroneous, or at times, nonsensical words or phrases to be inadvertently transcribed; Although I have reviewed the note for such errors, some may still exist.

## 2025-01-02 NOTE — TELEPHONE ENCOUNTER
Caller: EDYTA LAURA    Relationship: Emergency Contact    What was the call regarding: PATIENT CANCELLED APPT TODAY DUE TO NOT FEELING WELL

## 2025-01-07 ENCOUNTER — TELEPHONE (OUTPATIENT)
Dept: CARDIOLOGY | Facility: CLINIC | Age: 75
End: 2025-01-07

## 2025-01-07 DIAGNOSIS — I35.1 NONRHEUMATIC AORTIC VALVE INSUFFICIENCY: Primary | ICD-10-CM

## 2025-01-07 NOTE — TELEPHONE ENCOUNTER
Caller: EDYTA LAURA    Relationship to patient: Emergency Contact    Best call back number: [phone]     Patient is needing: REQUESTING TEST RESULTS FROM ECHO. PLS CALL AND ADVISE

## 2025-01-08 ENCOUNTER — TELEPHONE (OUTPATIENT)
Dept: PHYSICAL THERAPY | Facility: CLINIC | Age: 75
End: 2025-01-08

## 2025-01-08 NOTE — TELEPHONE ENCOUNTER
Caller: EDYTA LAURA    Relationship: Emergency Contact         What was the call regarding: WEATHER, STILL FROXE IN

## 2025-01-13 ENCOUNTER — TELEPHONE (OUTPATIENT)
Dept: PHYSICAL THERAPY | Facility: OTHER | Age: 75
End: 2025-01-13
Payer: MEDICARE

## 2025-01-13 NOTE — TELEPHONE ENCOUNTER
Caller: EDYTA LAURA    Relationship: Emergency Contact    What was the call regarding:GOING TO THE HOSPITAL SO WIFE CANCELLED ALL APPTS FOR THIS WEEK

## 2025-01-15 NOTE — SIGNIFICANT NOTE
Patient is admitted to hospital, per wife. Procedure cancelled. Instructed wife to call MD office to reschedule.

## 2025-01-16 ENCOUNTER — HOSPITAL ENCOUNTER (OUTPATIENT)
Dept: CARDIOLOGY | Facility: HOSPITAL | Age: 75
Discharge: HOME | End: 2025-01-16
Payer: MEDICARE

## 2025-01-28 ENCOUNTER — OFFICE VISIT (OUTPATIENT)
Dept: PSYCHIATRY | Facility: CLINIC | Age: 75
End: 2025-01-28
Payer: MEDICARE

## 2025-01-28 DIAGNOSIS — F33.1 MODERATE EPISODE OF RECURRENT MAJOR DEPRESSIVE DISORDER: Chronic | ICD-10-CM

## 2025-01-28 DIAGNOSIS — F51.05 INSOMNIA DUE TO MENTAL CONDITION: Primary | Chronic | ICD-10-CM

## 2025-01-28 RX ORDER — TRAZODONE HYDROCHLORIDE 150 MG/1
150 TABLET ORAL NIGHTLY
Qty: 30 TABLET | Refills: 2 | Status: SHIPPED | OUTPATIENT
Start: 2025-01-28

## 2025-01-28 NOTE — PROGRESS NOTES
[facility]   [address], [address]  [place] [address]  [phone]  Merna Layton, MSN, APRN, PMHNP-BC        Subjective     Chris Tinsley is a 74 y.o. male who presents today for follow up for psychiatric medication management.     Chief Complaint:  Depression, anxiety     History of Present Illness:     Medication adjustments last visit:   Continue trazodone 100mg nightly.     Patient or patient representative verbalized consent for the use of Ambient Listening during the visit with  TORI Mckeon for chart documentation. 1/28/2025  13:11 EST  History of Present Illness  The patient is a 74-year-old male being seen for psychiatric medication management follow-up for major depressive disorder, generalized anxiety disorder, and insomnia.    Patient here for follow up today, in a wheelchair, accompanied by his daughter.   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 His daughter says it is a non-curable illness, but the name written on the paper she has lists ischemia. Unsure if patient had a stroke? She states a spinal tap was performed, but the results are pending. He has been informed that his condition is incurable. His current residence is a nursing home, where he is undergoing strength training to regain his ability to walk, as he currently experiences difficulty with mobility and frequent falls.    His blood pressure medication was discontinued due to episodes of dizziness and lightheadedness upon standing, which have since resolved.    He continues to take Norco for pain management, administered every 12 hours, although he reports significant pain, particularly in his neck. He has a new pain specialist, whom he has not yet consulted. He also reports severe pain in his arm, which is red and sensitive to touch. He is receiving physical therapy for this issue.    He was  prescribed triazolam for an MRI procedure, which has been completed. His sleep is managed with trazodone. Prior to being in the rehab facility, the patient was taking two Tylenol PM with his trazodone. Since being in rehab, his daughter states they won't give him the Tylenol PM. We discussed temporarily increasing his trazodone to 150mg nightly. She states he had a good night's sleep last night and has been doing well overall.    Denies any SI/HI/AVH.     MEDICATIONS  Discontinued: Lisinopril.  Current: Norco, trazodone, tramadol, Tylenol PM.      9/26/24: Accompanied to appointment by daughter. He is still waiting on a scan in October for abdomen. He is also going to have an MRI, due to prostate numbers being elevated.   They are anticipating surgery on his right shoulder at some point. He has pain all the time and can't move it.   His sleep is doing ok. He is still getting up multiple times in the night to use the bathroom, but states it has improved to what it was..   He is having some light headedness,they feel could possibly be dehydration.   He says he feels as if he is losing muscle mass, ,but doesn't feel like he has lost any weight.   He is seeing the ortho surgeon on October 8th, and they are hoping to get some answers.   Denies any suicidal thoughts.   He would like to continue the trazodone, but not add any other medications or make any changes.     6/27/24:  Accompanied to his follow up appointment today by his daughter.   He had prostate surgery, and still is having trouble getting up multiple times at night to urinate. He was doing ok sleeping in the recliner, but since he has moved to the bed, now he gets up a lot. They are not sure why.   He also is going to have to have another shoulder surgery. They are upset about this.   He also has a mass they have found in his abdomen, that he is pending an MRI for. They are obviously worried about these things, but she states he is still in good spirits.    Sleep has been poor, but he is getting up at night to urinate. He does state he is able to fall back asleep.   Denies any suicidal thoughts.       Patient presents with symptoms and behaviors that are consistent with the following DSM-5 diagnoses:  Major depressive disorder  2.  Generalized anxiety disorder  3. Insomnia     The following portions of the patient's history were reviewed and updated as appropriate: allergies, current medications, past family history, past medical history, past social history, past surgical history and problem list.    PAST OUTPATIENT TREATMENT  Diagnosis treated:  Affective Disorder, Anxiety/Panic Disorder  Treatment Type:  Medication Management  Prior Psychiatric Medications:  He tried one medication in the past but doesn't remember what it is.   Support Groups:  None  Sequelae Of Mental Disorder:  emotional distress    Interval History  New pt    Side Effects  None      Past Medical History:  Past Medical History:   Diagnosis Date    Abnormal nuclear stress test 06/22/2019    Chronic deep vein thrombosis (DVT) of left lower extremity     BHATTI (nonalcoholic steatohepatitis) 06/2019    Unstable angina 06/22/2019    Vertigo        Social History:  Social History     Socioeconomic History    Marital status:    Tobacco Use    Smoking status: Never     Passive exposure: Never    Smokeless tobacco: Never   Vaping Use    Vaping status: Never Used   Substance and Sexual Activity    Alcohol use: Not Currently     Comment: quit 20 yrs ago    Drug use: No    Sexual activity: Defer       Family History:  Family History   Problem Relation Age of Onset    Heart disease Father        Past Surgical History:  Past Surgical History:   Procedure Laterality Date    CARDIAC CATHETERIZATION N/A 06/22/2019    Procedure: LEFT HEART CATH with possible PCI;  Surgeon: Enmanuel Wild MD;  [facility]ATH [facility]AS[facility];  Service: Cardiovascular    COLON SURGERY      CORONARY ARTERY  BYPASS GRAFT N/A 06/24/2019    Procedure: CABG;  Surgeon: Jose Angel López MD;  Location: [facility];  Service: Cardiothoracic    LIVER BIOPSY  06/2019    TOTAL SHOULDER REPLACEMENT         Problem List:  Patient Active Problem List   Diagnosis    Benign essential HTN    Chronic pain    Osteoarthritis    Chronic deep vein thrombosis (DVT) of left lower extremity    Chronic insomnia    Dizziness    Shortness of breath    Coronary artery disease involving native coronary artery with unstable angina pectoris    S/P CABG (LIMA to LAD, SVG to PDA, SVG to lateral branch of ramus, SVG to OM1 --medial branch of ramus) per Dr. López 6/24/2019    Mixed hyperlipidemia    Family history of diabetes mellitus    Generalized anxiety disorder    Post-herpetic polyneuropathy    Proteinuria    Stage 3a chronic kidney disease    BHATTI (nonalcoholic steatohepatitis)       Allergy:   Allergies   Allergen Reactions    Ibuprofen Nausea And Vomiting        Discontinued Medications:  Medications Discontinued During This Encounter   Medication Reason    lisinopril (PRINIVIL,ZESTRIL) 10 MG tablet     TRIAZOLAM PO     traZODone (DESYREL) 100 MG tablet Reorder         Current Medications:   Current Outpatient Medications   Medication Sig Dispense Refill    traZODone (DESYREL) 150 MG tablet Take 1 tablet by mouth Every Night. 30 tablet 2    albuterol sulfate  (90 Base) MCG/ACT inhaler Inhale 2 puffs Every 4 (Four) Hours As Needed for Shortness of Air. 18 g 0    atorvastatin (LIPITOR) 40 MG tablet TAKE 1 TABLET BY MOUTH EVERY NIGHT 90 tablet 0    cetirizine (zyrTEC) 10 MG tablet Take 1 tablet by mouth Daily.      cholecalciferol (VITAMIN D3) 25 MCG (1000 UT) tablet Take 1 tablet by mouth Daily.      folic acid (FOLVITE) 1 MG tablet Take 1 tablet by mouth.      [START ON 2/21/2025] HYDROcodone-acetaminophen (NORCO)  MG per tablet Take 1 tablet by mouth Every 12 (Twelve) Hours As Needed for Moderate Pain. 60 tablet 0    [START ON  3/20/2025] HYDROcodone-acetaminophen (NORCO)  MG per tablet Take 1 tablet by mouth Every 12 (Twelve) Hours As Needed for Moderate Pain. 60 tablet 0    HYDROcodone-acetaminophen (NORCO)  MG per tablet Take 1 tablet by mouth Every 12 (Twelve) Hours As Needed for Moderate Pain. 60 tablet 0    hydrocortisone 2.5 % cream Apply 1 application  topically to the appropriate area as directed 2 (Two) Times a Day. 30 g 1    loratadine (CLARITIN) 10 MG tablet 1 tablet.      Omega-3 Fatty Acids (FISH OIL) 1000 MG capsule capsule Take  by mouth Daily With Breakfast.      omeprazole (priLOSEC) 40 MG capsule Take 1 capsule by mouth.      predniSONE (DELTASONE) 10 MG (21) dose pack Take  by mouth Daily. Use as directed on package 21 each 0     No current facility-administered medications for this visit.       MENTAL STATUS EXAM   General Appearance:  Cleanly groomed and dressed  Eye Contact:  Good eye contact  Attitude:  Cooperative  Motor Activity: patient using a wheelchair today.  Muscle Strength:  Other  Other Comment:  Generalized weakness  Speech:  Minimal spontaneity  Language:  Unspontaneous  Mood and affect:  Depressed and flat  Hopelessness:  Denies  Loneliness: Denies  Thought Process:  Linear  Associations/ Thought Content:  No delusions  Hallucinations:  None  Suicidal Ideations:  Not present  Homicidal Ideation:  Not present  Sensorium:  Alert  Orientation:  Person and place  Immediate Recall, Recent, and Remote Memory:  Deficit noted  Attention Span/ Concentration:  Easily distracted  Fund of Knowledge:  Appropriate for age and educational level  Intellectual Functioning:  Average range  Insight:  Fair  Judgement:  Fair  Reliability:  Fair  Impulse Control:  Fair       Physical Exam:   There were no vitals taken for this visit.       PHQ-9 Depression Screening  Little interest or pleasure in doing things? Almost all   Feeling down, depressed, or hopeless? Almost all   PHQ-2 Total Score 6   Trouble falling  or staying asleep, or sleeping too much? Over half   Feeling tired or having little energy? Almost all   Poor appetite or overeating? Not at all   Feeling bad about yourself - or that you are a failure or have let yourself or your family down? Almost all   Trouble concentrating on things, such as reading the newspaper or watching television? Not at all   Moving or speaking so slowly that other people could have noticed? Or the opposite - being so fidgety or restless that you have been moving around a lot more than usual? Almost all   Thoughts that you would be better off dead, or of hurting yourself in some way? Not at all   PHQ-9 Total Score 17   If you checked off any problems, how difficult have these problems made it for you to do your work, take care of things at home, or get along with other people? Extremely difficult    GAD7 Documentation:  Feeling nervous, anxious or on edge 1   Not being able to stop or control worrying 0   Worrying too much about different things 0   Trouble relaxing 0   Being so restless that it is hard to sit still 0   Becoming easily annoyed or irritable 0   Feeling Afraid as if something awful might happen 0   LULÚ Total Score 1   How difficult have these problems made it for you? Somewhat difficult           Never smoker    I advised Chris of the risks of tobacco use.     Result Review:    Labs:  Hospital Outpatient Visit on 12/31/2024   Component Date Value Ref Range Status    LVIDd 12/31/2024 5.5  cm Final    LVIDs 12/31/2024 4.4  cm Final    IVSd 12/31/2024 1.10  cm Final    LVPWd 12/31/2024 1.10  cm Final    FS 12/31/2024 19.6  % Final    IVS/LVPW 12/31/2024 0.99  cm Final    ESV(cubed) 12/31/2024 87.5  ml Final    LV Sys Vol (BSA corrected) 12/31/2024 36.5  cm2 Final    EDV(cubed) 12/31/2024 168.1  ml Final    LV Vitale Vol (BSA corrected) 12/31/2024 56.9  cm2 Final    LV mass(C)d 12/31/2024 243.2  grams Final    LVOT area 12/31/2024 3.4  cm2 Final    LVOT diam 12/31/2024 2.09  cm  Final    EDV(MOD-sp4) 12/31/2024 103.5  ml Final    ESV(MOD-sp4) 12/31/2024 66.5  ml Final    SV(MOD-sp4) 12/31/2024 37.0  ml Final    SVi(MOD-SP4) 12/31/2024 20.4  ml/m2 Final    SVi (LVOT) 12/31/2024 31.9  ml/m2 Final    EF(MOD-sp4) 12/31/2024 35.8  % Final    MV E max reagan 12/31/2024 51.7  cm/sec Final    MV A max reagan 12/31/2024 82.4  cm/sec Final    MV dec time 12/31/2024 0.19  sec Final    MV E/A 12/31/2024 0.63   Final    Pulm A Revs Dur 12/31/2024 0.09  sec Final    TR max reagan 12/31/2024 262.9  cm/sec Final    SV(LVOT) 12/31/2024 58.0  ml Final    LA dimension (2D)  12/31/2024 4.1  cm Final    Pulm Sys Reagan 12/31/2024 50.6  cm/sec Final    Pulm Vitale Reagan 12/31/2024 39.7  cm/sec Final    Pulm S/D 12/31/2024 1.28   Final    Pulm A Revs Reagan 12/31/2024 20.3  cm/sec Final    LV V1 max 12/31/2024 87.4  cm/sec Final    LV V1 max PG 12/31/2024 3.1  mmHg Final    LV V1 mean PG 12/31/2024 1.53  mmHg Final    LV V1 VTI 12/31/2024 16.9  cm Final    Ao pk reagan 12/31/2024 189.8  cm/sec Final    Ao max PG 12/31/2024 14.5  mmHg Final    Ao mean PG 12/31/2024 4.8  mmHg Final    Ao V2 VTI 12/31/2024 25.9  cm Final    RICHARD(I,D) 12/31/2024 2.24  cm2 Final    AI P1/2t 12/31/2024 695.2  msec Final    MV max PG 12/31/2024 2.7  mmHg Final    MV mean PG 12/31/2024 0.93  mmHg Final    MV V2 VTI 12/31/2024 23.4  cm Final    MVA(VTI) 12/31/2024 2.48  cm2 Final    MV dec slope 12/31/2024 270.8  cm/sec2 Final    MR max reagan 12/31/2024 494.8  cm/sec Final    MR max PG 12/31/2024 98.0  mmHg Final    TR max PG 12/31/2024 27.8  mmHg Final    RVSP(TR) 12/31/2024 30.8  mmHg Final    RAP systole 12/31/2024 3.0  mmHg Final    PA V2 max 12/31/2024 71.5  cm/sec Final    PA acc time 12/31/2024 0.09  sec Final    PI end-d reagan 12/31/2024 73.2  cm/sec Final    Ao root diam 12/31/2024 3.0  cm Final    ACS 12/31/2024 1.32  cm Final    RV Base 12/31/2024 3.90  cm Final    RV Mid 12/31/2024 2.00  cm Final    TAPSE (>1.6) 12/31/2024 1.40  cm Final   Admission on  12/16/2024, Discharged on 12/16/2024   Component Date Value Ref Range Status    SARS Antigen 12/16/2024 Not Detected  Not Detected, Presumptive Negative Final    Influenza A Antigen NORA 12/16/2024 Not Detected  Not Detected Final    Influenza B Antigen NORA 12/16/2024 Not Detected  Not Detected Final    Internal Control 12/16/2024 Passed  Passed Final    Lot Number 12/16/2024 [id number]   Final    Expiration Date 12/16/2024 10/18/2025   Final       Assessment & Plan   Diagnoses and all orders for this visit:    1. Insomnia due to mental condition (Primary)  -     traZODone (DESYREL) 150 MG tablet; Take 1 tablet by mouth Every Night.  Dispense: 30 tablet; Refill: 2    2. Moderate episode of recurrent major depressive disorder        Assessment & Plan  Insomnia   Continue trazodone, increase to 150mg nightly.     2. Major depressive disorder    Follow-up  The patient will follow up in 4 months.      Continue trazodone 100mg nightly.     Visit Diagnoses:    ICD-10-CM ICD-9-CM   1. Insomnia due to mental condition  F51.05 300.9     327.02   2. Moderate episode of recurrent major depressive disorder  F33.1 296.32           TREATMENT PLAN/GOALS: Continue supportive psychotherapy efforts and medications as indicated. Treatment and medication options discussed during today's visit. Patient ackowledged and verbally consented to continue with current treatment plan and was educated on the importance of compliance with treatment and follow-up appointments.    CRISIS RESOURCES:    In the event you have personal crisis, there are several resources to reach someone to talk with:    988 Suicide and Crisis Lifeline  Call or text 984 or chat 988"[email or web address][email or web address]  [facility]'s National Helpline  4-890-388-HELP (4352)  Text your zip code to 873223 (HELP4U)  [place]'s Crisis Line  Dial 988, then press 1  Text 377524    MEDICATION ISSUES:  INSPECT reviewed as expected    Discussed medication options and treatment plan of prescribed medication as  well as the risks, benefits, and side effects including potential falls, possible impaired driving and metabolic adversities among others. Patient is agreeable to call the office with any worsening of symptoms or onset of side effects. Patient is agreeable to call 911 or go to the nearest ER should he/she begin having SI/HI. No medication side effects or related complaints today.     MEDS ORDERED DURING VISIT:  New Medications Ordered This Visit   Medications    traZODone (DESYREL) 150 MG tablet     Sig: Take 1 tablet by mouth Every Night.     Dispense:  30 tablet     Refill:  2       Return in about 4 months (around 5/28/2025) for Video visit.         This document has been electronically signed by TORI Mckeon  January 28, 2025 14:45 EST    Part of this note may be an electronic transcription/translation of spoken language to printed text using the Dragon Dictation System.

## 2025-01-30 ENCOUNTER — TELEPHONE (OUTPATIENT)
Dept: PAIN MEDICINE | Facility: CLINIC | Age: 75
End: 2025-01-30
Payer: MEDICARE

## 2025-01-30 NOTE — TELEPHONE ENCOUNTER
Caller: EDYTA LAURA    Relationship: Emergency Contact    Best call back number:     What is the best time to reach you: ANY     Who are you requesting to speak with (clinical staff, provider,  specific staff member): CLINICAL    What was the call regarding: PATIENT IS NOW IN A REHAB FACILITY AND HAS SOME CONCERNS ABOUT HIS MEDICATION.  THEY DONT FEEL AS IF HE IS GETTING ENOUGH MEDICATION TO DEAL WITH THERAPY AND TREATMENT AND WOULD LIKE SOME ASSISTANCE.  THEY PREFER TO SEE DR JEAN GOING FORWARD FOR CARE    Is it okay if the provider responds through MyChart: PLEASE CALL

## 2025-01-30 NOTE — TELEPHONE ENCOUNTER
Spoke to Eleanor. She states she wants to make a sooner appt. With whoever can see patient sooner. He is at [facility].

## 2025-03-11 ENCOUNTER — TELEPHONE (OUTPATIENT)
Dept: PAIN MEDICINE | Facility: CLINIC | Age: 75
End: 2025-03-11
Payer: MEDICARE

## 2025-03-11 NOTE — TELEPHONE ENCOUNTER
Caller: EDYTA LAURA    Relationship to patient: Emergency Contact    Best call back number: [phone] (home)     Chief complaint: 3 MONTH F/U  DR VALE PT  20 MME/DAY// RESDCHEDULED     Type of visit: FOLLOW UP - DR VALE PATIENT    Requested date: ASAP    If rescheduling, when is the original appointment:  3/18/25    Additional notes: MS MAURERS STATED THE PATIENT CANNOT WAIT UNTIL THE NEXT AVAILABLE APPT ON 4/15/25 AND SHE WOULD LIKE HIM TO BE SEEN SOONER IF POSSIBLE

## 2025-03-20 RX ORDER — ATORVASTATIN CALCIUM 40 MG/1
40 TABLET, FILM COATED ORAL NIGHTLY
Qty: 90 TABLET | Refills: 0 | Status: SHIPPED | OUTPATIENT
Start: 2025-03-20

## 2025-03-27 RX ORDER — ATORVASTATIN CALCIUM 40 MG/1
40 TABLET, FILM COATED ORAL NIGHTLY
Qty: 90 TABLET | Refills: 0 | Status: SHIPPED | OUTPATIENT
Start: 2025-03-27

## 2025-04-01 PROBLEM — R94.5 ABNORMAL LIVER FUNCTION: Status: ACTIVE | Noted: 2024-08-05

## 2025-04-01 PROBLEM — Z46.1 ENCOUNTER FOR FITTING AND ADJUSTMENT OF HEARING AID: Status: ACTIVE | Noted: 2024-08-05

## 2025-04-01 PROBLEM — R68.89 OTHER GENERAL SYMPTOMS AND SIGNS: Status: ACTIVE | Noted: 2024-08-05

## 2025-04-01 PROBLEM — K75.81 NONALCOHOLIC STEATOHEPATITIS (NASH): Status: ACTIVE | Noted: 2019-06-01

## 2025-04-01 PROBLEM — H91.90 HOH (HARD OF HEARING): Status: ACTIVE | Noted: 2025-04-01

## 2025-04-01 PROBLEM — N39.0 URINARY TRACT INFECTION: Status: ACTIVE | Noted: 2024-08-05

## 2025-04-01 PROBLEM — Z71.81 SPIRITUAL OR RELIGIOUS COUNSELING: Status: ACTIVE | Noted: 2024-08-05

## 2025-04-01 PROBLEM — I10 BENIGN ESSENTIAL HYPERTENSION: Chronic | Status: ACTIVE | Noted: 2019-06-20

## 2025-04-01 PROBLEM — Z71.89 OTHER SPECIFIED COUNSELING: Status: ACTIVE | Noted: 2024-08-05

## 2025-04-01 PROBLEM — Z95.1 POSTSURGICAL AORTOCORONARY BYPASS STATUS: Status: ACTIVE | Noted: 2019-06-24

## 2025-04-01 PROBLEM — I88.0 NONSPECIFIC MESENTERIC ADENITIS: Status: ACTIVE | Noted: 2024-08-05

## 2025-04-01 PROBLEM — I25.10 ATHEROSCLEROTIC HEART DISEASE OF NATIVE CORONARY ARTERY WITHOUT ANGINA PECTORIS: Status: ACTIVE | Noted: 2024-08-05

## 2025-04-01 PROBLEM — R42 DIZZINESS: Status: ACTIVE | Noted: 2019-06-21

## 2025-04-01 PROBLEM — K63.89 OTHER SPECIFIED DISEASES OF INTESTINE: Status: ACTIVE | Noted: 2024-08-05

## 2025-04-01 PROBLEM — R05.1 ACUTE COUGH: Status: ACTIVE | Noted: 2024-08-05

## 2025-04-01 PROBLEM — K62.1 RECTAL POLYP: Status: ACTIVE | Noted: 2024-08-05

## 2025-04-01 PROBLEM — R26.2 DIFFICULTY IN WALKING: Status: ACTIVE | Noted: 2024-08-05

## 2025-04-01 PROBLEM — Z74.09 OTHER REDUCED MOBILITY: Status: ACTIVE | Noted: 2024-08-05

## 2025-04-01 PROBLEM — F32.A DEPRESSION, UNSPECIFIED: Status: ACTIVE | Noted: 2025-01-01

## 2025-04-01 PROBLEM — G47.00 INSOMNIA: Status: ACTIVE | Noted: 2024-08-05

## 2025-04-01 PROBLEM — Z96.611 HISTORY OF REVERSE TOTAL REPLACEMENT OF RIGHT SHOULDER JOINT: Status: ACTIVE | Noted: 2024-08-05

## 2025-04-01 PROBLEM — I25.2 HISTORY OF MI (MYOCARDIAL INFARCTION): Status: ACTIVE | Noted: 2025-04-01

## 2025-04-01 PROBLEM — I21.9 MYOCARDIAL INFARCTION: Status: ACTIVE | Noted: 2024-08-05

## 2025-04-01 PROBLEM — I82.409 DEEP VENOUS THROMBOSIS OF LOWER EXTREMITY: Status: ACTIVE | Noted: 2024-08-05

## 2025-04-01 PROBLEM — Z71.9 ENCOUNTER FOR COUNSELING: Status: ACTIVE | Noted: 2024-08-05

## 2025-04-01 PROBLEM — R94.6 ABNORMAL FINDING ON THYROID FUNCTION TEST: Status: ACTIVE | Noted: 2024-08-05

## 2025-04-01 PROBLEM — F43.10 PTSD (POST-TRAUMATIC STRESS DISORDER): Status: ACTIVE | Noted: 2025-01-01

## 2025-04-01 PROBLEM — N40.0 BENIGN PROSTATIC HYPERPLASIA: Status: ACTIVE | Noted: 2022-02-11

## 2025-04-01 PROBLEM — T84.59XD INFECTION AND INFLAMMATORY REACTION DUE TO OTHER INTERNAL JOINT PROSTHESIS, SUBSEQUENT ENCOUNTER: Status: ACTIVE | Noted: 2025-03-06

## 2025-04-01 PROBLEM — N18.30 STAGE 3 CHRONIC KIDNEY DISEASE: Status: ACTIVE | Noted: 2025-01-01

## 2025-04-01 PROBLEM — E78.5 HYPERLIPIDEMIA, UNSPECIFIED: Status: ACTIVE | Noted: 2025-01-01

## 2025-04-01 PROBLEM — I25.10 CORONARY ARTERIOSCLEROSIS: Status: ACTIVE | Noted: 2019-06-24

## 2025-04-01 PROBLEM — I82.5Z2: Status: ACTIVE | Noted: 2025-01-01

## 2025-04-01 PROBLEM — R06.02 SHORTNESS OF BREATH: Status: ACTIVE | Noted: 2019-06-21

## 2025-04-01 PROBLEM — R10.30 LOWER ABDOMINAL PAIN, UNSPECIFIED: Status: ACTIVE | Noted: 2024-08-05

## 2025-04-01 PROBLEM — I12.9 HYPERTENSIVE CHRONIC KIDNEY DISEASE W STG 1-4/UNSP CHR KDNY: Status: ACTIVE | Noted: 2025-01-01

## 2025-04-01 PROBLEM — N52.9 IMPOTENCE OF ORGANIC ORIGIN: Status: ACTIVE | Noted: 2024-08-05

## 2025-04-01 PROBLEM — G89.29 OTHER CHRONIC PAIN: Status: ACTIVE | Noted: 2025-03-06

## 2025-04-01 PROBLEM — Z83.511 FAMILY HISTORY OF GLAUCOMA: Status: ACTIVE | Noted: 2024-08-05

## 2025-04-01 PROBLEM — M25.511 PAIN IN RIGHT SHOULDER: Status: ACTIVE | Noted: 2024-08-05

## 2025-04-01 PROBLEM — I25.10 CAD (CORONARY ARTERY DISEASE): Status: ACTIVE | Noted: 2025-04-01

## 2025-04-01 PROBLEM — E78.5 HYPERLIPIDEMIA: Status: ACTIVE | Noted: 2025-04-01

## 2025-04-01 PROBLEM — I21.9 HEART ATTACK: Status: ACTIVE | Noted: 2025-04-01

## 2025-04-01 PROBLEM — I25.2 OLD MYOCARDIAL INFARCTION: Status: ACTIVE | Noted: 2025-01-01

## 2025-04-01 PROBLEM — I10 HTN (HYPERTENSION): Status: ACTIVE | Noted: 2025-04-01

## 2025-04-01 PROBLEM — Z77.29 EXPOSURE TO POTENTIALLY HAZARDOUS SUBSTANCE: Status: ACTIVE | Noted: 2024-08-05

## 2025-04-02 DIAGNOSIS — F51.05 INSOMNIA DUE TO MENTAL CONDITION: Chronic | ICD-10-CM

## 2025-04-02 NOTE — TELEPHONE ENCOUNTER
Rx Refill Note  Requested Prescriptions     Pending Prescriptions Disp Refills    traZODone (DESYREL) 100 MG tablet [Pharmacy Med Name: TRAZODONE 100MG TABLETS] 90 tablet 1     Sig: TAKE 1 TABLET BY MOUTH EVERY NIGHT      Last office visit with prescribing clinician: 1/28/2025     Next office visit with prescribing clinician: 6/3/2025     Office Visit with Merna Layton APRN (01/28/2025)     Med check - 6-    Erma Mayes MA  04/02/25, 16:12 EDT

## 2025-04-03 RX ORDER — TRAZODONE HYDROCHLORIDE 100 MG/1
100 TABLET ORAL NIGHTLY
Qty: 90 TABLET | Refills: 0 | Status: SHIPPED | OUTPATIENT
Start: 2025-04-03

## 2025-04-08 ENCOUNTER — APPOINTMENT (OUTPATIENT)
Dept: CT IMAGING | Facility: HOSPITAL | Age: 75
End: 2025-04-08
Payer: OTHER GOVERNMENT

## 2025-04-08 ENCOUNTER — HOSPITAL ENCOUNTER (EMERGENCY)
Facility: HOSPITAL | Age: 75
Discharge: HOME | End: 2025-04-08
Attending: EMERGENCY MEDICINE | Admitting: EMERGENCY MEDICINE
Payer: OTHER GOVERNMENT

## 2025-04-08 VITALS
TEMPERATURE: 97.8 F | DIASTOLIC BLOOD PRESSURE: 66 MMHG | HEART RATE: 81 BPM | BODY MASS INDEX: 20.15 KG/M2 | RESPIRATION RATE: 18 BRPM | OXYGEN SATURATION: 99 % | WEIGHT: 132.94 LBS | SYSTOLIC BLOOD PRESSURE: 109 MMHG | HEIGHT: 68 IN

## 2025-04-08 DIAGNOSIS — K59.00 CONSTIPATION, UNSPECIFIED CONSTIPATION TYPE: ICD-10-CM

## 2025-04-08 DIAGNOSIS — R10.84 GENERALIZED ABDOMINAL PAIN: Primary | ICD-10-CM

## 2025-04-08 LAB
ALBUMIN SERPL-MCNC: 4 G/DL (ref 3.5–5.2)
ALBUMIN/GLOB SERPL: 1.4 G/DL
ALP SERPL-CCNC: 87 U/L (ref 39–117)
ALT SERPL W P-5'-P-CCNC: 6 U/L (ref 1–41)
ANION GAP SERPL CALCULATED.3IONS-SCNC: 12.7 MMOL/L (ref 5–15)
ANISOCYTOSIS BLD QL: NORMAL
AST SERPL-CCNC: 23 U/L (ref 1–40)
BASOPHILS # BLD AUTO: 0.01 10*3/MM3 (ref 0–0.2)
BASOPHILS NFR BLD AUTO: 0.2 % (ref 0–1.5)
BILIRUB SERPL-MCNC: 0.5 MG/DL (ref 0–1.2)
BUN SERPL-MCNC: 22 MG/DL (ref 8–23)
BUN/CREAT SERPL: 18.2 (ref 7–25)
CALCIUM SPEC-SCNC: 10.5 MG/DL (ref 8.6–10.5)
CHLORIDE SERPL-SCNC: 101 MMOL/L (ref 98–107)
CO2 SERPL-SCNC: 22.3 MMOL/L (ref 22–29)
CREAT SERPL-MCNC: 1.21 MG/DL (ref 0.76–1.27)
DEPRECATED RDW RBC AUTO: 53.4 FL (ref 37–54)
EGFRCR SERPLBLD CKD-EPI 2021: 63.6 ML/MIN/1.73
EOSINOPHIL # BLD AUTO: 0.07 10*3/MM3 (ref 0–0.4)
EOSINOPHIL NFR BLD AUTO: 1.3 % (ref 0.3–6.2)
ERYTHROCYTE [DISTWIDTH] IN BLOOD BY AUTOMATED COUNT: 14.7 % (ref 12.3–15.4)
GLOBULIN UR ELPH-MCNC: 2.9 GM/DL
GLUCOSE SERPL-MCNC: 132 MG/DL (ref 65–99)
HCT VFR BLD AUTO: 35.7 % (ref 37.5–51)
HGB BLD-MCNC: 11.2 G/DL (ref 13–17.7)
IMM GRANULOCYTES # BLD AUTO: 0.02 10*3/MM3 (ref 0–0.05)
IMM GRANULOCYTES NFR BLD AUTO: 0.4 % (ref 0–0.5)
LARGE PLATELETS: NORMAL
LIPASE SERPL-CCNC: 22 U/L (ref 13–60)
LYMPHOCYTES # BLD AUTO: 0.82 10*3/MM3 (ref 0.7–3.1)
LYMPHOCYTES NFR BLD AUTO: 15.3 % (ref 19.6–45.3)
MACROCYTES BLD QL SMEAR: NORMAL
MCH RBC QN AUTO: 30.5 PG (ref 26.6–33)
MCHC RBC AUTO-ENTMCNC: 31.4 G/DL (ref 31.5–35.7)
MCV RBC AUTO: 97.3 FL (ref 79–97)
MONOCYTES # BLD AUTO: 0.57 10*3/MM3 (ref 0.1–0.9)
MONOCYTES NFR BLD AUTO: 10.6 % (ref 5–12)
NEUTROPHILS NFR BLD AUTO: 3.88 10*3/MM3 (ref 1.7–7)
NEUTROPHILS NFR BLD AUTO: 72.2 % (ref 42.7–76)
NRBC BLD AUTO-RTO: 0 /100 WBC (ref 0–0.2)
PLATELET # BLD AUTO: 97 10*3/MM3 (ref 140–450)
PMV BLD AUTO: 11.9 FL (ref 6–12)
POTASSIUM SERPL-SCNC: 3.9 MMOL/L (ref 3.5–5.2)
PROT SERPL-MCNC: 6.9 G/DL (ref 6–8.5)
RBC # BLD AUTO: 3.67 10*6/MM3 (ref 4.14–5.8)
SMALL PLATELETS BLD QL SMEAR: NORMAL
SODIUM SERPL-SCNC: 136 MMOL/L (ref 136–145)
WBC MORPH BLD: NORMAL
WBC NRBC COR # BLD AUTO: 5.37 10*3/MM3 (ref 3.4–10.8)

## 2025-04-08 PROCEDURE — 99284 EMERGENCY DEPT VISIT MOD MDM: CPT

## 2025-04-08 PROCEDURE — 85025 COMPLETE CBC W/AUTO DIFF WBC: CPT | Performed by: EMERGENCY MEDICINE

## 2025-04-08 PROCEDURE — 85007 BL SMEAR W/DIFF WBC COUNT: CPT | Performed by: EMERGENCY MEDICINE

## 2025-04-08 PROCEDURE — 80053 COMPREHEN METABOLIC PANEL: CPT | Performed by: EMERGENCY MEDICINE

## 2025-04-08 PROCEDURE — 83690 ASSAY OF LIPASE: CPT | Performed by: EMERGENCY MEDICINE

## 2025-04-08 PROCEDURE — 74176 CT ABD & PELVIS W/O CONTRAST: CPT

## 2025-04-08 RX ORDER — SODIUM CHLORIDE 0.9 % (FLUSH) 0.9 %
10 SYRINGE (ML) INJECTION AS NEEDED
Status: DISCONTINUED | OUTPATIENT
Start: 2025-04-08 | End: 2025-04-08 | Stop reason: HOSPADM

## 2025-04-08 RX ORDER — SORBITOL SOLUTION 70 %
15 SOLUTION, ORAL MISCELLANEOUS DAILY PRN
Qty: 474 ML | Refills: 0 | Status: SHIPPED | OUTPATIENT
Start: 2025-04-08

## 2025-04-08 NOTE — ED PROVIDER NOTES
Subjective   History of Present Illness  Patient is a 74-year-old male complaining of abdominal pain for the past 24 hours.  He denies cough fever vomiting diarrhea dysuria or other associated complaints      Review of Systems    Past Medical History:   Diagnosis Date    Abnormal nuclear stress test 06/22/2019    Chronic deep vein thrombosis (DVT) of left lower extremity     BHATTI (nonalcoholic steatohepatitis) 06/2019    Unstable angina 06/22/2019    Vertigo        Allergies   Allergen Reactions    Ibuprofen Nausea And Vomiting       Past Surgical History:   Procedure Laterality Date    CARDIAC CATHETERIZATION N/A 06/22/2019    Procedure: LEFT HEART CATH with possible PCI;  Surgeon: Enmanuel Wild MD;  Location: [facility] CATH INVASIVE LOCATION;  Service: Cardiovascular    COLON SURGERY      CORONARY ARTERY BYPASS GRAFT N/A 06/24/2019    Procedure: CABG;  Surgeon: Jose Angel López MD;  Location: [facility] CVOR;  Service: Cardiothoracic    LIVER BIOPSY  06/2019    TOTAL SHOULDER REPLACEMENT         Family History   Problem Relation Age of Onset    Heart disease Father        Social History     Socioeconomic History    Marital status:    Tobacco Use    Smoking status: Never     Passive exposure: Never    Smokeless tobacco: Never   Vaping Use    Vaping status: Never Used   Substance and Sexual Activity    Alcohol use: Not Currently     Comment: quit 20 yrs ago    Drug use: No    Sexual activity: Defer           Objective   Physical Exam  Neck has no adenopathy JVD or bruits.  Lungs are clear.  Heart has regular rhythm without murmur rub or gallop.  Chest is nontender.  Abdomen is soft with mild diffuse tenderness.  Patient has normal bowel sounds without rebound or guard.  Back has no CVA tenderness Permenter exam unremarkable.  Procedures           ED Course      Results for orders placed or performed during the hospital encounter of 04/08/25   Comprehensive Metabolic Panel    Collection Time: 04/08/25  12:18 PM    Specimen: Blood   Result Value Ref Range    Glucose 132 (H) 65 - 99 mg/dL    BUN 22 8 - 23 mg/dL    Creatinine 1.21 0.76 - 1.27 mg/dL    Sodium 136 136 - 145 mmol/L    Potassium 3.9 3.5 - 5.2 mmol/L    Chloride 101 98 - 107 mmol/L    CO2 22.3 22.0 - 29.0 mmol/L    Calcium 10.5 8.6 - 10.5 mg/dL    Total Protein 6.9 6.0 - 8.5 g/dL    Albumin 4.0 3.5 - 5.2 g/dL    ALT (SGPT) 6 1 - 41 U/L    AST (SGOT) 23 1 - 40 U/L    Alkaline Phosphatase 87 39 - 117 U/L    Total Bilirubin 0.5 0.0 - 1.2 mg/dL    Globulin 2.9 gm/dL    A/G Ratio 1.4 g/dL    BUN/Creatinine Ratio 18.2 7.0 - 25.0    Anion Gap 12.7 5.0 - 15.0 mmol/L    eGFR 63.6 >60.0 mL/min/1.73   Lipase    Collection Time: 04/08/25 12:18 PM    Specimen: Blood   Result Value Ref Range    Lipase 22 13 - 60 U/L   CBC Auto Differential    Collection Time: 04/08/25 12:18 PM    Specimen: Blood   Result Value Ref Range    WBC 5.37 3.40 - 10.80 10*3/mm3    RBC 3.67 (L) 4.14 - 5.80 10*6/mm3    Hemoglobin 11.2 (L) 13.0 - 17.7 g/dL    Hematocrit 35.7 (L) 37.5 - 51.0 %    MCV 97.3 (H) 79.0 - 97.0 fL    MCH 30.5 26.6 - 33.0 pg    MCHC 31.4 (L) 31.5 - 35.7 g/dL    RDW 14.7 12.3 - 15.4 %    RDW-SD 53.4 37.0 - 54.0 fl    MPV 11.9 6.0 - 12.0 fL    Platelets 97 (L) 140 - 450 10*3/mm3    Neutrophil % 72.2 42.7 - 76.0 %    Lymphocyte % 15.3 (L) 19.6 - 45.3 %    Monocyte % 10.6 5.0 - 12.0 %    Eosinophil % 1.3 0.3 - 6.2 %    Basophil % 0.2 0.0 - 1.5 %    Immature Grans % 0.4 0.0 - 0.5 %    Neutrophils, Absolute 3.88 1.70 - 7.00 10*3/mm3    Lymphocytes, Absolute 0.82 0.70 - 3.10 10*3/mm3    Monocytes, Absolute 0.57 0.10 - 0.90 10*3/mm3    Eosinophils, Absolute 0.07 0.00 - 0.40 10*3/mm3    Basophils, Absolute 0.01 0.00 - 0.20 10*3/mm3    Immature Grans, Absolute 0.02 0.00 - 0.05 10*3/mm3    nRBC 0.0 0.0 - 0.2 /100 WBC   Scan Slide    Collection Time: 04/08/25 12:18 PM    Specimen: Blood   Result Value Ref Range    Anisocytosis Slight/1+ None Seen    Macrocytes Slight/1+ None Seen     WBC Morphology Normal Normal    Platelet Estimate Decreased Normal    Large Platelets Slight/1+ None Seen     CT Abdomen Pelvis Without Contrast  Result Date: 4/8/2025  Impression: 1.Examination is limited due to lack of IV contrast administration. 2.No acute abnormality identified within the abdomen or pelvis. 3.Cholelithiasis. 4.Colonic diverticulosis. 5.Large amount of stool throughout the colon. Additional 3.2 cm diverticulum arising from the jejunum centered on axial image 70. 6.Enlarged prostate gland. Recommend correlation with PSA levels. 7.Additional findings as detailed above. Electronically Signed: Kevin Hi MD  4/8/2025 12:40 PM EDT  Workstation ID: [id number]                                                     Medical Decision Making  My interpretation patient CT scan ab pelvis without contrast shows no obstruction perforation or other acute abnormality.  CBC shows no leukocytosis no left shift and borderline anemia unchanged from patient's baseline.  Lipase normal with no evidence of pancreatitis.  LFTs normal with no evidence hepatitis.  Patient with no renal insufficiency or electrolyte abnormality.  Patient be discharged.  Will be placed on sorbitol.  Will see his MD for recheck    Amount and/or Complexity of Data Reviewed  Labs: ordered. Decision-making details documented in ED Course.  Radiology: ordered and independent interpretation performed.    Risk  Prescription drug management.        Final diagnoses:   Generalized abdominal pain   Constipation, unspecified constipation type       ED Disposition  ED Disposition       ED Disposition   Discharge    Condition   Stable    Comment   --               No follow-up provider specified.       Medication List        New Prescriptions      sorbitol 70 % solution  Take 15 mL by mouth Daily As Needed (Constipation).               Where to Get Your Medications        Information about where to get these medications is not yet available    Ask your  nurse or doctor about these medications  sorbitol 70 % solution            Bryce Randall MD  04/08/25 [id number]

## 2025-04-09 ENCOUNTER — OFFICE VISIT (OUTPATIENT)
Dept: CARDIOLOGY | Facility: CLINIC | Age: 75
End: 2025-04-09
Payer: MEDICARE

## 2025-04-09 VITALS
OXYGEN SATURATION: 98 % | HEIGHT: 68 IN | HEART RATE: 118 BPM | DIASTOLIC BLOOD PRESSURE: 67 MMHG | WEIGHT: 133 LBS | SYSTOLIC BLOOD PRESSURE: 93 MMHG | BODY MASS INDEX: 20.16 KG/M2

## 2025-04-09 DIAGNOSIS — I10 BENIGN ESSENTIAL HTN: Primary | Chronic | ICD-10-CM

## 2025-04-09 DIAGNOSIS — I25.10 CORONARY ARTERY DISEASE INVOLVING NATIVE CORONARY ARTERY OF NATIVE HEART WITHOUT ANGINA PECTORIS: ICD-10-CM

## 2025-04-09 DIAGNOSIS — I25.10 CORONARY ARTERIOSCLEROSIS: ICD-10-CM

## 2025-04-09 DIAGNOSIS — I25.10 ATHEROSCLEROSIS OF NATIVE CORONARY ARTERY OF NATIVE HEART WITHOUT ANGINA PECTORIS: ICD-10-CM

## 2025-04-09 PROCEDURE — 3078F DIAST BP <80 MM HG: CPT | Performed by: INTERNAL MEDICINE

## 2025-04-09 PROCEDURE — 99214 OFFICE O/P EST MOD 30 MIN: CPT | Performed by: INTERNAL MEDICINE

## 2025-04-09 PROCEDURE — 3074F SYST BP LT 130 MM HG: CPT | Performed by: INTERNAL MEDICINE

## 2025-04-09 PROCEDURE — 1160F RVW MEDS BY RX/DR IN RCRD: CPT | Performed by: INTERNAL MEDICINE

## 2025-04-09 PROCEDURE — 1159F MED LIST DOCD IN RCRD: CPT | Performed by: INTERNAL MEDICINE

## 2025-04-09 RX ORDER — MINOCYCLINE HYDROCHLORIDE 100 MG/1
100 CAPSULE ORAL 2 TIMES DAILY
COMMUNITY

## 2025-04-09 NOTE — PROGRESS NOTES
Cardiology Office Visit      Encounter Date:  04/09/2025    Patient ID:   Chris Tinsley is a 75 y.o. male.      Reason For Followup:  Coronary artery disease  Hypertension  Hyperlipidemia  Chronic renal insufficiency    Brief Clinical History:  Dear Dr. Peter, Dwain Bello MD    I had the pleasure of seeing Chris Tinsley today. As you are well aware, this is a 75 y.o. male with a past medical history that is significant for history of hypertension hyperlipidemia history of known coronary artery disease here for follow-up       Interval History:  Denies any new cardiac symptoms  Denies any exertional symptoms of chest pain shortness of breath dizziness or syncope  Compliant with medical therapy  Recent significant weight loss  Patient blood pressure is low requiring discontinuation of his blood pressure medication    Assessment & Plan    Impressions:  Multivessel coronary artery disease status post coronary artery bypass surgery/2019  Normal LV systolic function  Coronary artery disease  Mild cardiomyopathy with LV ejection fraction of 50%  Hypertension  Hyperipidemia  Acute on chronic renal insufficiency creatinine is back to baseline/recent labs with a stable creatinine  Normal myocardial perfusion study in January 2023    Recommendations:  From cardiac standpoint patient is low risk to undergo the planned surgical procedure  Continue aggressive risk factor modification  Medications reviewed with the patient  Continue current medical therapy  Recent labs reviewed and discussed with patient   Test results and labs discussed with the patient  Medical records from recent hospitalization reviewed and discussed with patient  Recent significant weight loss  Blood pressure is somewhat low  Patient is advised to monitor blood pressure at home and if he is having symptomatic hypotension consider starting patient on midodrine  Diagnosis and treatment options reviewed and discussed with patient and family  Continue  "current medical therapy  Continue close monitoring and follow-up  Follow-up in office in 6 months        Vitals:  Vitals:    04/09/25 1153   BP: 93/67   BP Location: Left arm   Patient Position: Sitting   Pulse: 118   SpO2: 98%   Weight: 60.3 kg (133 lb)   Height: 172.7 cm (68\")       Physical Exam:    General: Alert, cooperative, no distress, appears stated age  Head:  Normocephalic, atraumatic, mucous membranes moist  Eyes:  Conjunctiva/corneas clear, EOM's intact     Neck:  Supple,  no adenopathy;      Lungs: Clear to auscultation bilaterally, no wheezes rhonchi rales are noted  Chest wall: No tenderness  Heart::  Regular rate and rhythm, S1 and S2 normal, no murmur, rub or gallop  Abdomen: Soft, non-tender, nondistended bowel sounds active  Extremities: No cyanosis, clubbing, or edema  Pulses: 2+ and symmetric all extremities  Skin:  No rashes or lesions  Neuro/psych: A&O x3. CN II through XII are grossly intact with appropriate affect              Lab Results   Component Value Date    GLUCOSE 132 (H) 04/08/2025    BUN 22 04/08/2025    CREATININE 1.21 04/08/2025    EGFR 63.6 04/08/2025    BCR 18.2 04/08/2025    K 3.9 04/08/2025    CO2 22.3 04/08/2025    CALCIUM 10.5 04/08/2025    ALBUMIN 4.0 04/08/2025    BILITOT 0.5 04/08/2025    AST 23 04/08/2025    ALT 6 04/08/2025     Results for orders placed during the hospital encounter of 12/31/24    Adult Transthoracic Echo Complete W/ Cont if Necessary Per Protocol    Interpretation Summary    Left ventricular ejection fraction appears to be 41 - 45%.    Left ventricular wall thickness is consistent with mild concentric hypertrophy.    Left ventricular diastolic function is consistent with (grade I) impaired relaxation.    The right ventricular cavity is mildly dilated.    The left atrial cavity is mildly dilated.    There is moderate calcification of the aortic valve mainly affecting the non-coronary cusp(s).    Moderate aortic valve regurgitation is present.    " Estimated right ventricular systolic pressure from tricuspid regurgitation is normal (<35 mmHg).     Results for orders placed during the hospital encounter of 06/20/19    Cardiac Catheterization/Vascular Study    Narrative  CARDIAC CATHETERIZATION REPORT    DATE OF PROCEDURE: 6/22/2019    INDICATION FOR PROCEDURE:    Unstable angina  Abnormal stress test with large area of inducible ischemia    PROCEDURE PERFORMED:  1.Left heart catheterization  2. coronary angiography  3. left ventriculography    PROCEDURE COMMENTS:    Informed consent was obtained from the patient after explaining risks and benefits.  Patient was brought to the cardiac interventional artery and placed on the table in the usual fashion.  Right groin was shaved and prepped in sterile fashion.  2% again was used with midsternal anesthesia to the right groin.  A total of 20 cc was used.  A 6 Tajik sheath was placed in the right femoral artery.  A 6 Tajik pigtail catheter, 6 Tajik JR4 catheter and a 6 Tajik JL4 catheter was used for the procedure.  After the cardiac catheterization is complete, all the catheters and sheath were removed.  Patient tolerated the procedure very well.  No complications noted.    FINDINGS:    1. HEMODYNAMICS:  , ventricular end-diastolic pressure 14-18, /84 with a mean pressure 111.    2. LEFT VENTRICULOGRAPHY: EF 50%, mild to moderate inferior wall hypokinesis , 1-2+ mitral regurgitation.    3. CORONARY ANGIOGRAPHY:    Left main: Normal    LAD: Proximal angiographic 70 to 80% stenosis.  Mid to distal focal area of 70% stenosis.  First diagonal branch is a moderate sized vessel with diffuse proximal 70% stenosis.    Left circumflex artery: 100% occlusion in the midportion with left to left collaterals filling the OM branch.    RCA: 100% occlusion in the proximal portion with left-to-right collaterals.    SUMMARY:  Severe three-vessel coronary artery disease.  100% occlusion of the circumflex and 100%  occlusion of the right coronary artery with left-to-right and left to left collaterals  Significant stenosis involving the LAD and diagonal branches  Mild LV systolic dysfunction with significant hypokinesis involving the basal inferior wall with an estimated LV contraction of 50%  1-2+ mitral regurgitation    RECOMMENDATIONS:  Surgical evaluation for consideration for a coronary artery bypass surgery  Echocardiogram to assess the LV systolic function and also rule out any significant valve pathology  Observe patient in PCU bed  Optimize medical therapy  Test results discussed the patient and family        Enmanuel Wild MD  6/22/2019  10:49 AM     Lab Results   Component Value Date    CHOL 119 08/08/2023    TRIG 144 08/08/2023    HDL 43 08/08/2023    LDL 51 08/08/2023      Results for orders placed during the hospital encounter of 01/03/23    Stress Test With Myocardial Perfusion One Day    Interpretation Summary    Myocardial perfusion study shows moderate size severe intensity mostly fixed perfusion defect involving the basal inferolateral wall suggestive of prior myocardial infarction with no significant reversible ischemia    Left ventricular ejection fraction is normal (Calculated EF = 60%).    Clinical correlation is recommended   Results for orders placed during the hospital encounter of 06/20/19    SCANNED - CARDIOLOGY           Objective:          Allergies:  Allergies   Allergen Reactions    Ibuprofen Nausea And Vomiting       Medication Review:     Current Outpatient Medications:     atorvastatin (LIPITOR) 40 MG tablet, TAKE 1 TABLET BY MOUTH EVERY NIGHT, Disp: 90 tablet, Rfl: 0    cetirizine (zyrTEC) 10 MG tablet, Take 1 tablet by mouth As Needed for Allergies., Disp: , Rfl:     cholecalciferol (VITAMIN D3) 25 MCG (1000 UT) tablet, Take 1 tablet by mouth Daily., Disp: , Rfl:     HYDROcodone-acetaminophen (NORCO)  MG per tablet, Take 1 tablet by mouth Every 12 (Twelve) Hours As Needed for  Moderate Pain., Disp: 60 tablet, Rfl: 0    HYDROcodone-acetaminophen (NORCO)  MG per tablet, Take 1 tablet by mouth Every 12 (Twelve) Hours As Needed for Moderate Pain., Disp: 60 tablet, Rfl: 0    HYDROcodone-acetaminophen (NORCO)  MG per tablet, Take 1 tablet by mouth Every 12 (Twelve) Hours As Needed for Moderate Pain., Disp: 60 tablet, Rfl: 0    hydrocortisone 2.5 % cream, Apply 1 application  topically to the appropriate area as directed 2 (Two) Times a Day., Disp: 30 g, Rfl: 1    loratadine (CLARITIN) 10 MG tablet, 1 tablet Daily As Needed., Disp: , Rfl:     minocycline (MINOCIN,DYNACIN) 100 MG capsule, Take 1 capsule by mouth 2 (Two) Times a Day., Disp: , Rfl:     Omega-3 Fatty Acids (FISH OIL) 1000 MG capsule capsule, Take  by mouth Daily With Breakfast., Disp: , Rfl:     omeprazole (priLOSEC) 40 MG capsule, Take 1 capsule by mouth As Needed., Disp: , Rfl:     sorbitol 70 % solution, Take 15 mL by mouth Daily As Needed (Constipation)., Disp: 474 mL, Rfl: 0    traZODone (DESYREL) 100 MG tablet, TAKE 1 TABLET BY MOUTH EVERY NIGHT, Disp: 90 tablet, Rfl: 0  No current facility-administered medications for this visit.    Family History:  Family History   Problem Relation Age of Onset    Heart disease Father        Past Medical History:  Past Medical History:   Diagnosis Date    Abnormal nuclear stress test 06/22/2019    Chronic deep vein thrombosis (DVT) of left lower extremity     BHATTI (nonalcoholic steatohepatitis) 06/2019    Unstable angina 06/22/2019    Vertigo        Past surgical History:  Past Surgical History:   Procedure Laterality Date    CARDIAC CATHETERIZATION N/A 06/22/2019    Procedure: LEFT HEART CATH with possible PCI;  Surgeon: Enmanuel Wild MD;  Location: [facility] CATH INVASIVE LOCATION;  Service: Cardiovascular    COLON SURGERY      CORONARY ARTERY BYPASS GRAFT N/A 06/24/2019    Procedure: CABG;  Surgeon: Jose Angel López MD;  Location: [facility] CVOR;  Service: Cardiothoracic     LIVER BIOPSY  06/2019    TOTAL SHOULDER REPLACEMENT         Social History:  Social History     Socioeconomic History    Marital status:    Tobacco Use    Smoking status: Never     Passive exposure: Never    Smokeless tobacco: Never   Vaping Use    Vaping status: Never Used   Substance and Sexual Activity    Alcohol use: Not Currently     Comment: quit 20 yrs ago    Drug use: No    Sexual activity: Defer       Review of Systems:  The following systems were reviewed as they relate to the cardiovascular system: Constitutional, Eyes, ENT, Cardiovascular, Respiratory, Gastrointestinal, Integumentary, Neurological, Psychiatric, Hematologic, Endocrine, Musculoskeletal, and Genitourinary. The pertinent cardiovascular findings are reported above with all other cardiovascular points within those systems being negative.    Diagnostic Study Review:     Current Electrocardiogram:  Procedures          NOTE: The following portions of the patient's history were reviewed and updated this visit as appropriate: allergies, current medications, past family history, past medical history, past social history, past surgical history and problem list.

## 2025-04-14 NOTE — PROGRESS NOTES
Subjective   Chris Tinsley is a 75 y.o. male is here for follow up for R shoulder pain and opioid management. Previously patient of Dr. Gill transferring care to me due to Dr. Souza's moving. New patient to me.  Last seen by Dr. Gill on 1/2/2025.  ED visit on 4/8/2025 for generalized abdominal pain.  Has been following Dr. Gill for right shoulder pain.  Recent explantation of right shoulder due to infection and will be following with Dr. Fabian at [facility] for follow up. As per daughter, there is not plan to put hardware as this is third time it has been infected. He has been working with PT.       Hx-previous patient of Dr. Solorio seen for right shoulder pain after sustaining a fall off of ladder and subsequently fracturing her left humerus.  He was on opioids while he was being treated by Ortho conservatively.  He subsequently underwent right shoulder replacement in 1/2025.  Unfortunately due to infection, he is s/p explantation of infected reverse shoulder arthroplasty and placement of antibiotic spacer on 3/5/2025.  He previously had cervical epidural injection without any significant pain relief.    Right shoulder pain is 8/10 on VAS, at maximum is 9/10. Pain is sharp, shooting, and stabbing in nature. Pain is referred right arm. The pain is constant. The pain is improved by pain medications. The pain is worse with using right shoulder/arm.    PHQ-9-     SOAPP- 8  [place] back disability scale - 83    Previous Injections:   AMY - no pain relief.   PMH:   Hypertension, DVT LLE, CAD status post CABG, postherpetic polyneuropathy, CKD stage III, BHATTI, depression, PTSD, s/p right shoulder replacement complicated by infection leading to explantation and placement of antibiotic spacer on 3/5/2025.    Current Medications:   Norco  mg BID PRN  Trazodone      Past Medications:    Past Modalities:  TENS:       no          Physical Therapy Within The Last 6 Months     yes  Psychotherapy     no  Massage  Therapy      no    Patient Complains Of:  Uro-Fecal Incontinence no  Weight Gain/Loss  no  Fever/Chills   no  Weakness   no      PEG Assessment   What number best describes your pain on average in the past week?8  What number best describes how, during the past week, pain has interfered with your enjoyment of life?8  What number best describes how, during the past week, pain has interfered with your general activity?  8    PHQ-9 Depression Screening  Little interest or pleasure in doing things?     Feeling down, depressed, or hopeless?     PHQ-2 Total Score     Trouble falling or staying asleep, or sleeping too much?     Feeling tired or having little energy?     Poor appetite or overeating?     Feeling bad about yourself - or that you are a failure or have let yourself or your family down?     Trouble concentrating on things, such as reading the newspaper or watching television?     Moving or speaking so slowly that other people could have noticed? Or the opposite - being so fidgety or restless that you have been moving around a lot more than usual?     Thoughts that you would be better off dead, or of hurting yourself in some way?     PHQ-9 Total Score     If you checked off any problems, how difficult have these problems made it for you to do your work, take care of things at home, or get along with other people?          Patient screened positive for depression based on a PHQ-9 score of 17 on 1/28/2025. Follow-up recommendations include: Suicide Risk Assessment performed.        Current Outpatient Medications:     atorvastatin (LIPITOR) 40 MG tablet, TAKE 1 TABLET BY MOUTH EVERY NIGHT, Disp: 90 tablet, Rfl: 0    cetirizine (zyrTEC) 10 MG tablet, Take 1 tablet by mouth As Needed for Allergies., Disp: , Rfl:     cholecalciferol (VITAMIN D3) 25 MCG (1000 UT) tablet, Take 1 tablet by mouth Daily., Disp: , Rfl:     hydrocortisone 2.5 % cream, Apply 1 application  topically to the appropriate area as directed 2 (Two)  Times a Day., Disp: 30 g, Rfl: 1    loratadine (CLARITIN) 10 MG tablet, 1 tablet Daily As Needed., Disp: , Rfl:     minocycline (MINOCIN,DYNACIN) 100 MG capsule, Take 1 capsule by mouth 2 (Two) Times a Day., Disp: , Rfl:     Omega-3 Fatty Acids (FISH OIL) 1000 MG capsule capsule, Take  by mouth Daily With Breakfast., Disp: , Rfl:     omeprazole (priLOSEC) 40 MG capsule, Take 1 capsule by mouth As Needed., Disp: , Rfl:     sorbitol 70 % solution, Take 15 mL by mouth Daily As Needed (Constipation)., Disp: 474 mL, Rfl: 0    traZODone (DESYREL) 100 MG tablet, TAKE 1 TABLET BY MOUTH EVERY NIGHT, Disp: 90 tablet, Rfl: 0    HYDROcodone-acetaminophen (NORCO)  MG per tablet, Take 1 tablet by mouth 3 (Three) Times a Day As Needed for Moderate Pain for up to 30 days., Disp: 70 tablet, Rfl: 0    [START ON 5/15/2025] HYDROcodone-acetaminophen (NORCO)  MG per tablet, Take 1 tablet by mouth 3 (Three) Times a Day As Needed for Moderate Pain for up to 30 days., Disp: 70 tablet, Rfl: 0    linaclotide (Linzess) 72 MCG capsule capsule, Take 1 capsule by mouth Every Morning Before Breakfast for 60 days., Disp: 30 capsule, Rfl: 1    tiZANidine (ZANAFLEX) 2 MG tablet, Take 1 tablet by mouth At Night As Needed for Muscle Spasms for up to 60 days., Disp: 30 tablet, Rfl: 1    The following portions of the patient's history were reviewed and updated as appropriate: allergies, current medications, past family history, past medical history, past social history, past surgical history, and problem list.      REVIEW OF PERTINENT MEDICAL DATA    Past Medical History:   Diagnosis Date    Abnormal nuclear stress test 06/22/2019    Chronic deep vein thrombosis (DVT) of left lower extremity     BHATTI (nonalcoholic steatohepatitis) 06/2019    Unstable angina 06/22/2019    Vertigo      Past Surgical History:   Procedure Laterality Date    CARDIAC CATHETERIZATION N/A 06/22/2019    Procedure: LEFT HEART CATH with possible PCI;  Surgeon:  Enmanuel Wild MD;  Location: [facility] CATH INVASIVE LOCATION;  Service: Cardiovascular    COLON SURGERY      CORONARY ARTERY BYPASS GRAFT N/A 06/24/2019    Procedure: CABG;  Surgeon: Jose Angel López MD;  Location: [facility] CVOR;  Service: Cardiothoracic    LIVER BIOPSY  06/2019    TOTAL SHOULDER REPLACEMENT       Family History   Problem Relation Age of Onset    Heart disease Father      Social History     Socioeconomic History    Marital status:    Tobacco Use    Smoking status: Never     Passive exposure: Never    Smokeless tobacco: Never   Vaping Use    Vaping status: Never Used   Substance and Sexual Activity    Alcohol use: Not Currently     Comment: quit 20 yrs ago    Drug use: No    Sexual activity: Defer         Review of Systems   Musculoskeletal:  Positive for arthralgias and joint swelling.         Vitals:    04/15/25 1127   BP: 111/76   Pulse: 67   Resp: 16   SpO2: 100%   Weight: 60.3 kg (133 lb)   PainSc: 8          Objective   Physical Exam  Musculoskeletal:         General: Tenderness present.        Arms:            Imaging Reviewed:  Right shoulder x-ray-3/25/2025  - Antibiotic spacer in place following explantation of right reverse shoulder arthroplasty.  Inferior subluxation of the shoulder.    MRI cervical spine without contrast-1/4/2023  - C3-4-mild spinal canal stenosis and mild left neuroforaminal stenosis  C4-5-disc osteophyte, mild facet arthropathy, mild spinal canal stenosis and mild bilateral neuroforaminal stenosis  C5-C6-mild left neuroforaminal stenosis  C6-7-right eccentric disc protrusion which exerts mass effect and indentation on the ventral cord resulting in mild spinal canal stenosis  C7-T1-no significant stenosis    Assessment:    1. Chronic right shoulder pain    2. High risk medication use    3. Chronic pain of both shoulders    4. Opioid-induced constipation         Plan:   1. Repeat UDS-4/15/2025.  Last UDS on 6/12/2024 is consistent with patient's interview.    2.. Reviewed Dr. Souza's notes, imaging and other physician's notes.   3.  Unfortunately due to her explantation of right shoulder he continues to have severe pain.  Continue Norco  mg BID- TID PRN - increased to #70 tabs due to increased pain with PT. Discussed with the patient regarding long-term side effects of opioids including but not limited to opioid induced hormonal suppression, hyperalgesia, fatigue, weight gain, possible opioid induced altered immune system, addiction, tolerance, dependence, risk of hearing loss and elevated risk of myocardial infarction. Proper use and potential life threatening side effects of over use discussed with patient. Patient states understanding of their use and risks.  Opioid Education for patient's receiving narcotics for pain: Patient was instructed regarding the risks, benefits, alternative forms of treatment, as well as potential development of tolerance and/or addiction. Patient was instructed to take the medication strictly as ordered, not to share the medication with others, not to drive while taking opioids, and to take no other sedatives/pain medications or alcohol while taking this prescription. The patient was instructed to wean off of the pain medication as healing occurs and pain resolves.   4. Start Tizanidine 2 mg qhs PRN.   5. Start Linzess 72 mcg before breakfast for opioid induced constipation.         RTC before 6/14/25.     Lucas Ramirez DO  Pain Management   [facility]     Greater than 40 minutes was spent with the patient, reviewing records, reviewing images, performing the exam, discussing diagnosis and further treatment options, etc., and greater than 50% was spent on education      INSPECT REPORT    As part of the patient's treatment plan, I may be prescribing controlled substances. The patient has been made aware of appropriate use of such medications, including potential risk of somnolence, limited ability to drive and/or work safely, and  the potential for dependence or overdose. It has also been made clear that these medications are for use by this patient only, without concomitant use of alcohol or other substances unless prescribed.     Patient has completed prescribing agreement detailing terms of continued prescribing of controlled substances, including monitoring INSPECT reports, urine drug screening, and pill counts if necessary. The patient is aware that inappropriate use will results in cessation of prescribing such medications.    INSPECT report has been reviewed and scanned into the patient's chart.      EMR Dragon/Transcription Disclaimer:   Much of this encounter note is an electronic transcription/translation of spoken language to printed text. The electronic translation of spoken language may permit erroneous, or at times, nonsensical words or phrases to be inadvertently transcribed; Although I have reviewed the note for such errors, some may still exist.

## 2025-04-15 ENCOUNTER — OFFICE VISIT (OUTPATIENT)
Dept: PAIN MEDICINE | Facility: CLINIC | Age: 75
End: 2025-04-15
Payer: MEDICARE

## 2025-04-15 VITALS
OXYGEN SATURATION: 100 % | RESPIRATION RATE: 16 BRPM | WEIGHT: 133 LBS | HEART RATE: 67 BPM | BODY MASS INDEX: 20.22 KG/M2 | SYSTOLIC BLOOD PRESSURE: 111 MMHG | DIASTOLIC BLOOD PRESSURE: 76 MMHG

## 2025-04-15 DIAGNOSIS — Z79.899 HIGH RISK MEDICATION USE: Primary | ICD-10-CM

## 2025-04-15 DIAGNOSIS — G89.29 CHRONIC RIGHT SHOULDER PAIN: ICD-10-CM

## 2025-04-15 DIAGNOSIS — M25.511 CHRONIC RIGHT SHOULDER PAIN: ICD-10-CM

## 2025-04-15 DIAGNOSIS — M25.512 CHRONIC PAIN OF BOTH SHOULDERS: ICD-10-CM

## 2025-04-15 DIAGNOSIS — T40.2X5A OPIOID-INDUCED CONSTIPATION: ICD-10-CM

## 2025-04-15 DIAGNOSIS — M25.511 CHRONIC PAIN OF BOTH SHOULDERS: ICD-10-CM

## 2025-04-15 DIAGNOSIS — G89.29 CHRONIC PAIN OF BOTH SHOULDERS: ICD-10-CM

## 2025-04-15 DIAGNOSIS — K59.03 OPIOID-INDUCED CONSTIPATION: ICD-10-CM

## 2025-04-15 RX ORDER — HYDROCODONE BITARTRATE AND ACETAMINOPHEN 10; 325 MG/1; MG/1
1 TABLET ORAL 3 TIMES DAILY PRN
Qty: 70 TABLET | Refills: 0 | Status: SHIPPED | OUTPATIENT
Start: 2025-04-15 | End: 2025-05-15

## 2025-04-15 RX ORDER — HYDROCODONE BITARTRATE AND ACETAMINOPHEN 10; 325 MG/1; MG/1
1 TABLET ORAL 3 TIMES DAILY PRN
Qty: 70 TABLET | Refills: 0 | Status: SHIPPED | OUTPATIENT
Start: 2025-05-15 | End: 2025-06-14

## 2025-04-15 RX ORDER — TIZANIDINE 2 MG/1
2 TABLET ORAL NIGHTLY PRN
Qty: 30 TABLET | Refills: 1 | Status: SHIPPED | OUTPATIENT
Start: 2025-04-15 | End: 2025-06-14

## 2025-05-02 ENCOUNTER — TELEPHONE (OUTPATIENT)
Dept: PAIN MEDICINE | Facility: CLINIC | Age: 75
End: 2025-05-02
Payer: MEDICARE

## 2025-05-22 ENCOUNTER — TELEPHONE (OUTPATIENT)
Dept: PAIN MEDICINE | Facility: CLINIC | Age: 75
End: 2025-05-22
Payer: MEDICARE

## 2025-05-22 NOTE — TELEPHONE ENCOUNTER
Wife called in tears asking if there was anything else you could offer for the arm and neck pain? The pain is getting worse the muscle relaxer is not helping. [facility] because of the increased pain. She is going to buy some CBD oil and try that but also wanted to check with you.

## 2025-06-09 NOTE — PROGRESS NOTES
Subjective   Chris Tinsley is a 75 y.o. male is here for follow up for R shoulder pain and opioid management.  Patient was last seen on 4/15/2025.  No significant changes in pain since last visit. Increased lower back, neck pain and entire back pain.     On last visit-started tizanidine- not helping.  and Linzess- helping      Right shoulder pain is 8/10 on VAS, at maximum is 9/10. Pain is sharp, shooting, and stabbing in nature. Pain is referred right arm. The pain is constant. The pain is improved by pain medications. The pain is worse with using right shoulder/arm.    PHQ-9-     SOAPP- 8  [place] back disability scale - 83    Previous Injections:   AMY - no pain relief.     Hx- Previous patient of Dr. Solorio seen for right shoulder pain after sustaining a fall off of ladder and subsequently fracturing her left humerus.  He was on opioids while he was being treated by Ortho conservatively.  He subsequently underwent right shoulder replacement in 1/2025.  Unfortunately due to infection, he is s/p explantation of infected reverse shoulder arthroplasty and placement of antibiotic spacer on 3/5/2025.  He previously had cervical epidural injection without any significant pain relief.Previously patient of Dr. Gill transferring care to me due to Dr. Souza's moving. New patient to me.  Last seen by Dr. Gill on 1/2/2025.  ED visit on 4/8/2025 for generalized abdominal pain.  Has been following Dr. Gill for right shoulder pain.  Recent explantation of right shoulder due to infection and will be following with Dr. Fabian at [facility] for follow up. As per daughter, there is not plan to put hardware as this is third time it has been infected. He has been working with PT.     PMH:   Hypertension, DVT LLE, CAD status post CABG, postherpetic polyneuropathy, CKD stage III, BHATTI, depression, PTSD, s/p right shoulder replacement complicated by infection leading to explantation and placement of antibiotic spacer on  3/5/2025.    Current Medications:   Norco  mg BID PRN  Trazodone      Past Medications:  Tizanidine - doesn't help     Past Modalities:  TENS:       no          Physical Therapy Within The Last 6 Months     yes  Psychotherapy     no  Massage Therapy      no    Patient Complains Of:  Uro-Fecal Incontinence no  Weight Gain/Loss  no  Fever/Chills   no  Weakness   no      PEG Assessment   What number best describes your pain on average in the past week?8  What number best describes how, during the past week, pain has interfered with your enjoyment of life?8  What number best describes how, during the past week, pain has interfered with your general activity?  8    PHQ-9 Depression Screening  Little interest or pleasure in doing things? Not at all   Feeling down, depressed, or hopeless? Not at all   PHQ-2 Total Score 0   Trouble falling or staying asleep, or sleeping too much?     Feeling tired or having little energy?     Poor appetite or overeating?     Feeling bad about yourself - or that you are a failure or have let yourself or your family down?     Trouble concentrating on things, such as reading the newspaper or watching television?     Moving or speaking so slowly that other people could have noticed? Or the opposite - being so fidgety or restless that you have been moving around a lot more than usual?     Thoughts that you would be better off dead, or of hurting yourself in some way?     PHQ-9 Total Score     If you checked off any problems, how difficult have these problems made it for you to do your work, take care of things at home, or get along with other people? Not difficult at all        Patient screened positive for depression based on a PHQ-9 score of 17 on 1/28/2025. Follow-up recommendations include: Suicide Risk Assessment performed.        Current Outpatient Medications:     atorvastatin (LIPITOR) 40 MG tablet, TAKE 1 TABLET BY MOUTH EVERY NIGHT, Disp: 90 tablet, Rfl: 0    cetirizine (zyrTEC)  10 MG tablet, Take 1 tablet by mouth As Needed for Allergies., Disp: , Rfl:     cholecalciferol (VITAMIN D3) 25 MCG (1000 UT) tablet, Take 1 tablet by mouth Daily., Disp: , Rfl:     [START ON 6/17/2025] HYDROcodone-acetaminophen (NORCO)  MG per tablet, Take 1 tablet by mouth 3 (Three) Times a Day As Needed for Moderate Pain for up to 30 days., Disp: 70 tablet, Rfl: 0    [START ON 7/17/2025] HYDROcodone-acetaminophen (NORCO)  MG per tablet, Take 1 tablet by mouth 3 (Three) Times a Day As Needed for Moderate Pain for up to 30 days., Disp: 70 tablet, Rfl: 0    [START ON 8/16/2025] HYDROcodone-acetaminophen (NORCO)  MG per tablet, Take 1 tablet by mouth 3 (Three) Times a Day As Needed for Moderate Pain for up to 30 days., Disp: 70 tablet, Rfl: 0    hydrocortisone 2.5 % cream, Apply 1 application  topically to the appropriate area as directed 2 (Two) Times a Day., Disp: 30 g, Rfl: 1    linaclotide (Linzess) 72 MCG capsule capsule, Take 1 capsule by mouth Every Morning Before Breakfast for 60 days., Disp: 30 capsule, Rfl: 1    loratadine (CLARITIN) 10 MG tablet, 1 tablet Daily As Needed., Disp: , Rfl:     minocycline (MINOCIN,DYNACIN) 100 MG capsule, Take 1 capsule by mouth 2 (Two) Times a Day., Disp: , Rfl:     Omega-3 Fatty Acids (FISH OIL) 1000 MG capsule capsule, Take  by mouth Daily With Breakfast., Disp: , Rfl:     omeprazole (priLOSEC) 40 MG capsule, Take 1 capsule by mouth As Needed., Disp: , Rfl:     sorbitol 70 % solution, Take 15 mL by mouth Daily As Needed (Constipation)., Disp: 474 mL, Rfl: 0    tiZANidine (ZANAFLEX) 2 MG tablet, Take 1 tablet by mouth At Night As Needed for Muscle Spasms for up to 60 days., Disp: 30 tablet, Rfl: 1    traZODone (DESYREL) 100 MG tablet, TAKE 1 TABLET BY MOUTH EVERY NIGHT, Disp: 90 tablet, Rfl: 0    gabapentin (NEURONTIN) 100 MG capsule, Take 1 capsule by mouth every night at bedtime for 90 days., Disp: 30 capsule, Rfl: 2    The following portions of the  patient's history were reviewed and updated as appropriate: allergies, current medications, past family history, past medical history, past social history, past surgical history, and problem list.      REVIEW OF PERTINENT MEDICAL DATA    Past Medical History:   Diagnosis Date    Abnormal nuclear stress test 06/22/2019    Chronic deep vein thrombosis (DVT) of left lower extremity     BHATTI (nonalcoholic steatohepatitis) 06/2019    Unstable angina 06/22/2019    Vertigo      Past Surgical History:   Procedure Laterality Date    CARDIAC CATHETERIZATION N/A 06/22/2019    Procedure: LEFT HEART CATH with possible PCI;  Surgeon: Enmanuel Wild MD;  Location: [facility] CATH INVASIVE LOCATION;  Service: Cardiovascular    COLON SURGERY      CORONARY ARTERY BYPASS GRAFT N/A 06/24/2019    Procedure: CABG;  Surgeon: Jose Angel López MD;  Location: [facility] CVOR;  Service: Cardiothoracic    LIVER BIOPSY  06/2019    TOTAL SHOULDER REPLACEMENT       Family History   Problem Relation Age of Onset    Heart disease Father      Social History     Socioeconomic History    Marital status:    Tobacco Use    Smoking status: Never     Passive exposure: Never    Smokeless tobacco: Never   Vaping Use    Vaping status: Never Used   Substance and Sexual Activity    Alcohol use: Not Currently     Comment: quit 20 yrs ago    Drug use: No    Sexual activity: Defer         Review of Systems   Musculoskeletal:  Positive for arthralgias and joint swelling.         Vitals:    06/11/25 1332   BP: 113/85   Pulse: 105   Resp: 16   SpO2: 99%   Weight: 60.3 kg (133 lb)   PainSc: 9            Objective   Physical Exam  Musculoskeletal:         General: Tenderness present.        Arms:            Imaging Reviewed:  Right shoulder x-ray-3/25/2025  - Antibiotic spacer in place following explantation of right reverse shoulder arthroplasty.  Inferior subluxation of the shoulder.    MRI cervical spine without contrast-1/4/2023  - C3-4-mild spinal canal  stenosis and mild left neuroforaminal stenosis  C4-5-disc osteophyte, mild facet arthropathy, mild spinal canal stenosis and mild bilateral neuroforaminal stenosis  C5-C6-mild left neuroforaminal stenosis  C6-7-right eccentric disc protrusion which exerts mass effect and indentation on the ventral cord resulting in mild spinal canal stenosis  C7-T1-no significant stenosis    Assessment:    1. Chronic right shoulder pain    2. High risk medication use    3. Chronic pain of both shoulders        Plan:   1.  Last UDS on 4/15/2025 is consistent with patient's interview.  Narcotic contract has been signed and Narcan has been prescribed previously.  2.. Reviewed Dr. Souza's notes, imaging and other physician's notes.   3.  Unfortunately due to her explantation of right shoulder he continues to have severe pain.  Continue Norco  mg BID- TID PRN - increased to #70 tabs due to increased pain with PT (6/17; 7/17; 8/16). Discussed with the patient regarding long-term side effects of opioids including but not limited to opioid induced hormonal suppression, hyperalgesia, fatigue, weight gain, possible opioid induced altered immune system, addiction, tolerance, dependence, risk of hearing loss and elevated risk of myocardial infarction. Proper use and potential life threatening side effects of over use discussed with patient. Patient states understanding of their use and risks.  Opioid Education for patient's receiving narcotics for pain: Patient was instructed regarding the risks, benefits, alternative forms of treatment, as well as potential development of tolerance and/or addiction. Patient was instructed to take the medication strictly as ordered, not to share the medication with others, not to drive while taking opioids, and to take no other sedatives/pain medications or alcohol while taking this prescription. The patient was instructed to wean off of the pain medication as healing occurs and pain resolves.   4. Start  Gabapentin 100 mg qhs. Side effects discussed with the patient including but not limited to somnolence, dizziness, ataxia, headache, fatigue, blurred vision, cold or flu-like symptoms,delusions, hoarseness, lack or loss of strength, lower back or side pain, swelling of the hands, feet, or lower legs trembling or shaking. Patient understands and agrees with the plan.  5. Cotinue Linzess 72 mcg before breakfast for opioid induced constipation.         RTC before 9/15/25    Lucas Ramirez DO  Pain Management   [facility]       INSPECT REPORT    As part of the patient's treatment plan, I may be prescribing controlled substances. The patient has been made aware of appropriate use of such medications, including potential risk of somnolence, limited ability to drive and/or work safely, and the potential for dependence or overdose. It has also been made clear that these medications are for use by this patient only, without concomitant use of alcohol or other substances unless prescribed.     Patient has completed prescribing agreement detailing terms of continued prescribing of controlled substances, including monitoring INSPECT reports, urine drug screening, and pill counts if necessary. The patient is aware that inappropriate use will results in cessation of prescribing such medications.    INSPECT report has been reviewed and scanned into the patient's chart.      EMR Dragon/Transcription Disclaimer:   Much of this encounter note is an electronic transcription/translation of spoken language to printed text. The electronic translation of spoken language may permit erroneous, or at times, nonsensical words or phrases to be inadvertently transcribed; Although I have reviewed the note for such errors, some may still exist.

## 2025-06-11 ENCOUNTER — OFFICE VISIT (OUTPATIENT)
Dept: PAIN MEDICINE | Facility: CLINIC | Age: 75
End: 2025-06-11
Payer: MEDICARE

## 2025-06-11 ENCOUNTER — TELEPHONE (OUTPATIENT)
Dept: PSYCHIATRY | Facility: CLINIC | Age: 75
End: 2025-06-11
Payer: OTHER GOVERNMENT

## 2025-06-11 VITALS
WEIGHT: 133 LBS | BODY MASS INDEX: 20.22 KG/M2 | DIASTOLIC BLOOD PRESSURE: 85 MMHG | HEART RATE: 105 BPM | OXYGEN SATURATION: 99 % | RESPIRATION RATE: 16 BRPM | SYSTOLIC BLOOD PRESSURE: 113 MMHG

## 2025-06-11 DIAGNOSIS — G89.29 CHRONIC RIGHT SHOULDER PAIN: ICD-10-CM

## 2025-06-11 DIAGNOSIS — Z79.899 HIGH RISK MEDICATION USE: ICD-10-CM

## 2025-06-11 DIAGNOSIS — G89.29 CHRONIC PAIN OF BOTH SHOULDERS: ICD-10-CM

## 2025-06-11 DIAGNOSIS — F51.05 INSOMNIA DUE TO MENTAL CONDITION: Chronic | ICD-10-CM

## 2025-06-11 DIAGNOSIS — M25.511 CHRONIC RIGHT SHOULDER PAIN: ICD-10-CM

## 2025-06-11 DIAGNOSIS — M25.512 CHRONIC PAIN OF BOTH SHOULDERS: ICD-10-CM

## 2025-06-11 DIAGNOSIS — M25.511 CHRONIC PAIN OF BOTH SHOULDERS: ICD-10-CM

## 2025-06-11 RX ORDER — HYDROCODONE BITARTRATE AND ACETAMINOPHEN 10; 325 MG/1; MG/1
1 TABLET ORAL 3 TIMES DAILY PRN
Qty: 70 TABLET | Refills: 0 | Status: SHIPPED | OUTPATIENT
Start: 2025-07-17 | End: 2025-08-16

## 2025-06-11 RX ORDER — HYDROCODONE BITARTRATE AND ACETAMINOPHEN 10; 325 MG/1; MG/1
1 TABLET ORAL 3 TIMES DAILY PRN
Qty: 70 TABLET | Refills: 0 | Status: SHIPPED | OUTPATIENT
Start: 2025-06-17 | End: 2025-07-17

## 2025-06-11 RX ORDER — TIZANIDINE 2 MG/1
2 TABLET ORAL NIGHTLY PRN
Qty: 30 TABLET | Refills: 1 | Status: CANCELLED | OUTPATIENT
Start: 2025-06-11 | End: 2025-08-10

## 2025-06-11 RX ORDER — GABAPENTIN 100 MG/1
CAPSULE ORAL
COMMUNITY
Start: 2025-06-09 | End: 2025-06-11

## 2025-06-11 RX ORDER — HYDROCODONE BITARTRATE AND ACETAMINOPHEN 10; 325 MG/1; MG/1
1 TABLET ORAL 3 TIMES DAILY PRN
Qty: 70 TABLET | Refills: 0 | Status: SHIPPED | OUTPATIENT
Start: 2025-08-16 | End: 2025-09-15

## 2025-06-11 RX ORDER — GABAPENTIN 100 MG/1
100 CAPSULE ORAL
Qty: 30 CAPSULE | Refills: 2 | Status: SHIPPED | OUTPATIENT
Start: 2025-06-11 | End: 2025-09-09

## 2025-06-11 NOTE — TELEPHONE ENCOUNTER
The wife called to report that her  is doing well. She said he tried doing the [employer] video and had problems connecting.  The patient wife phoned to report that her  is having a hard time sleeping. She wanted to know if he could increase the dose of Trazadone 100 mg  two tablets at night instead of one.   Please advise

## 2025-06-12 RX ORDER — TRAZODONE HYDROCHLORIDE 100 MG/1
TABLET ORAL
Qty: 180 TABLET | Refills: 0 | Status: SHIPPED | OUTPATIENT
Start: 2025-06-12

## 2025-06-14 DIAGNOSIS — F51.05 INSOMNIA DUE TO MENTAL CONDITION: Chronic | ICD-10-CM

## 2025-06-15 RX ORDER — TRAZODONE HYDROCHLORIDE 150 MG/1
150 TABLET ORAL NIGHTLY
Qty: 30 TABLET | Refills: 2 | OUTPATIENT
Start: 2025-06-15

## (undated) DEVICE — ELECTRD BLD EZ CLN STD 6.5IN

## (undated) DEVICE — Device

## (undated) DEVICE — 28 FR STRAIGHT – SOFT PVC CATHETER: Brand: PVC THORACIC CATHETERS

## (undated) DEVICE — PK PERFUS CUST W/CARDIOPLEGIA

## (undated) DEVICE — GLV SURG BIOGEL LTX PF 7

## (undated) DEVICE — ANTIBACTERIAL UNDYED BRAIDED (POLYGLACTIN 910), SYNTHETIC ABSORBABLE SUTURE: Brand: COATED VICRYL

## (undated) DEVICE — PK OPN HEART WHT WRP 50

## (undated) DEVICE — HEMOCONCENTRATOR PERFUS LPS06

## (undated) DEVICE — SUT PROLN 3/0 V7 D/A 36IN 8976H

## (undated) DEVICE — SOL IRRIG H2O 1000ML STRL

## (undated) DEVICE — SUT PROLN 7/0 BV1 D/A 30IN 8703H

## (undated) DEVICE — ST ACC MICROPUNCTURE STFF/CANN PLAT/TP 4F 21G 40CM

## (undated) DEVICE — CONNECT Y INTERSEPT W/LL 3/8 X 3/8 X 3/8IN

## (undated) DEVICE — DRAPE SHEET ULTRAGARD: Brand: MEDLINE

## (undated) DEVICE — PK TRY HEART CATH 50

## (undated) DEVICE — CATH DIAG IMPULSE FR4 6F 100CM

## (undated) DEVICE — CATH DIAG IMPULSE FL4 6F 100CM

## (undated) DEVICE — PK PROC TURNOVER ADV CVOR

## (undated) DEVICE — GLV SURG BIOGEL M LTX PF 8 1/2

## (undated) DEVICE — SUT SILK 0 CT1 CR8 18IN C021D

## (undated) DEVICE — SPNG GZ AVANT 6PLY 4X4IN STRL PK/2

## (undated) DEVICE — PK ATS CUST W CARDIOTOMY RESEVOIR

## (undated) DEVICE — TEMP PACING WIRE: Brand: MYO/WIRE

## (undated) DEVICE — SUT SILK 2 SUTUPAK TIE 60IN SA8H 2STRAND

## (undated) DEVICE — SUCTION CANISTER, 1500CC,SAFELINER: Brand: DEROYAL

## (undated) DEVICE — INSUFFLATION TUBING,LAPAROSCOPIC: Brand: DEROYAL

## (undated) DEVICE — SUT PROLN 4/0 V7 36IN 8975H

## (undated) DEVICE — BNDG ELAS ELITE V/CLOSE 4IN 5YD LF STRL

## (undated) DEVICE — SUT SILK 2/0 TIES 18IN A185H

## (undated) DEVICE — SUT PROLENE PP 7/0 BV175-6 24IN

## (undated) DEVICE — PUNCH AORT DIA EDGE 4.0MM

## (undated) DEVICE — CORONARY ARTERY BYPASS GRAFT MARKERS, STAINLESS STEEL, DISTAL, WITHOUT HOLDER: Brand: ANASTOMARK CORONARY ARTERY BYPASS GRAFT MARKERS, STAINLESS STEEL, DISTAL

## (undated) DEVICE — BLOWER MISTER CLEARVIEW W/TBG

## (undated) DEVICE — DRSNG WND BORDR/ADHS NONADHR/GZ LF 4X10IN STRL

## (undated) DEVICE — VASOVIEW HEMOPRO: Brand: VASOVIEW HEMOPRO

## (undated) DEVICE — SUT SILK 2/0 SH CR8 18IN CR8 C012D

## (undated) DEVICE — TOWEL,OR,DSP,ST,WHITE,DLX,4/PK,20PK/CS: Brand: MEDLINE

## (undated) DEVICE — CANN AORT ROOT DLP VNT/8IN 14G 7F

## (undated) DEVICE — SUT PDS 0 CT-1 Z340H

## (undated) DEVICE — SOL NACL 0.9PCT 1000ML

## (undated) DEVICE — PINNACLE INTRODUCER SHEATH: Brand: PINNACLE

## (undated) DEVICE — SOL IRRIG NACL 9PCT 1000ML BTL

## (undated) DEVICE — SUT SILK 4/0 TIES 18IN A183H

## (undated) DEVICE — ELECTRD DEFIB M/FUNC PROPADZ STRL 2PK

## (undated) DEVICE — CABL BIPOL W/ALLGTR CLIP/SM 12FT

## (undated) DEVICE — GLV SURG BIOGEL LTX PF 8

## (undated) DEVICE — CANN ART EOPA 3D NV W/CONN 20F

## (undated) DEVICE — BNDG ELAS ELITE V/CLOSE 6IN 5YD LF STRL

## (undated) DEVICE — TUBING, SUCTION, 1/4" X 12', STRAIGHT: Brand: MEDLINE

## (undated) DEVICE — BLD SCLPL BEAVR MINI STR 2BVL 180D LF

## (undated) DEVICE — GAUZE,SPONGE,4"X4",32PLY,XRAY,STRL,LF: Brand: MEDLINE

## (undated) DEVICE — SYS PERFUS SEP PLATLT W TIPS CUST

## (undated) DEVICE — SENSR CERBRL O2 PK/2

## (undated) DEVICE — SUT PROLN 4/0 V5 36IN 8935H

## (undated) DEVICE — SUT PROLN 6/0 RB2 D/A 30IN 8711H

## (undated) DEVICE — CATH DIAG IMPULSE PIG .056 6F 110CM

## (undated) DEVICE — GLV SURG BIOGEL LTX PF 7 1/2

## (undated) DEVICE — BG BLD SYS

## (undated) DEVICE — SUT PROLN 5/0 V5 36IN 8934H

## (undated) DEVICE — GW PTFE EMERALD HEPCOAT FC J TIP STD .035 3MM 150CM

## (undated) DEVICE — SUT VIC 3/0 FS1 27IN J442H